# Patient Record
Sex: FEMALE | Race: WHITE | HISPANIC OR LATINO | Employment: FULL TIME | URBAN - METROPOLITAN AREA
[De-identification: names, ages, dates, MRNs, and addresses within clinical notes are randomized per-mention and may not be internally consistent; named-entity substitution may affect disease eponyms.]

---

## 2018-07-30 ENCOUNTER — TRANSCRIBE ORDERS (OUTPATIENT)
Dept: ADMINISTRATIVE | Facility: HOSPITAL | Age: 58
End: 2018-07-30

## 2018-07-30 DIAGNOSIS — Z12.39 SCREENING BREAST EXAMINATION: Primary | ICD-10-CM

## 2018-08-03 ENCOUNTER — HOSPITAL ENCOUNTER (OUTPATIENT)
Dept: MAMMOGRAPHY | Facility: CLINIC | Age: 58
Discharge: HOME | End: 2018-08-03
Payer: COMMERCIAL

## 2018-08-03 DIAGNOSIS — Z12.39 SCREENING BREAST EXAMINATION: ICD-10-CM

## 2018-08-03 PROCEDURE — 77067 SCR MAMMO BI INCL CAD: CPT

## 2019-06-29 ENCOUNTER — APPOINTMENT (EMERGENCY)
Dept: CT IMAGING | Facility: HOSPITAL | Age: 59
DRG: 063 | End: 2019-06-29
Payer: COMMERCIAL

## 2019-06-29 ENCOUNTER — HOSPITAL ENCOUNTER (INPATIENT)
Facility: HOSPITAL | Age: 59
LOS: 3 days | Discharge: HOME | DRG: 063 | End: 2019-07-02
Attending: EMERGENCY MEDICINE | Admitting: INTERNAL MEDICINE
Payer: COMMERCIAL

## 2019-06-29 ENCOUNTER — APPOINTMENT (EMERGENCY)
Dept: RADIOLOGY | Facility: HOSPITAL | Age: 59
DRG: 063 | End: 2019-06-29
Payer: COMMERCIAL

## 2019-06-29 DIAGNOSIS — R20.0 LEFT SIDED NUMBNESS: Primary | ICD-10-CM

## 2019-06-29 DIAGNOSIS — K21.9 GERD (GASTROESOPHAGEAL REFLUX DISEASE): ICD-10-CM

## 2019-06-29 DIAGNOSIS — Z72.0 TOBACCO ABUSE: ICD-10-CM

## 2019-06-29 DIAGNOSIS — I63.9 CVA (CEREBRAL VASCULAR ACCIDENT) (HCC): ICD-10-CM

## 2019-06-29 LAB
ANION GAP BLD CALC-SCNC: 16 MMOL/L (ref 4–13)
ANION GAP SERPL CALCULATED.3IONS-SCNC: 9 MMOL/L (ref 4–13)
APTT PPP: 30 SECONDS (ref 23–37)
BUN BLD-MCNC: 9 MG/DL (ref 5–25)
BUN SERPL-MCNC: 11 MG/DL (ref 5–25)
CA-I BLD-SCNC: 1.17 MMOL/L (ref 1.12–1.32)
CALCIUM SERPL-MCNC: 9.3 MG/DL (ref 8.3–10.1)
CHLORIDE BLD-SCNC: 104 MMOL/L (ref 100–108)
CHLORIDE SERPL-SCNC: 105 MMOL/L (ref 100–108)
CO2 SERPL-SCNC: 27 MMOL/L (ref 21–32)
CREAT BLD-MCNC: 0.6 MG/DL (ref 0.6–1.3)
CREAT SERPL-MCNC: 0.7 MG/DL (ref 0.6–1.3)
ERYTHROCYTE [DISTWIDTH] IN BLOOD BY AUTOMATED COUNT: 12.5 % (ref 11.6–15.1)
EST. AVERAGE GLUCOSE BLD GHB EST-MCNC: 117 MG/DL
GFR SERPL CREATININE-BSD FRML MDRD: 100 ML/MIN/1.73SQ M
GFR SERPL CREATININE-BSD FRML MDRD: 95 ML/MIN/1.73SQ M
GLUCOSE SERPL-MCNC: 93 MG/DL (ref 65–140)
GLUCOSE SERPL-MCNC: 94 MG/DL (ref 65–140)
HBA1C MFR BLD: 5.7 % (ref 4.2–6.3)
HCT VFR BLD AUTO: 39 % (ref 34.8–46.1)
HCT VFR BLD CALC: 39 % (ref 34.8–46.1)
HGB BLD-MCNC: 13 G/DL (ref 11.5–15.4)
HGB BLDA-MCNC: 13.3 G/DL (ref 11.5–15.4)
INR PPP: 1.05 (ref 0.84–1.19)
MCH RBC QN AUTO: 30.3 PG (ref 26.8–34.3)
MCHC RBC AUTO-ENTMCNC: 33.3 G/DL (ref 31.4–37.4)
MCV RBC AUTO: 91 FL (ref 82–98)
PCO2 BLD: 26 MMOL/L (ref 21–32)
PLATELET # BLD AUTO: 337 THOUSANDS/UL (ref 149–390)
PMV BLD AUTO: 8.6 FL (ref 8.9–12.7)
POTASSIUM BLD-SCNC: 3.8 MMOL/L (ref 3.5–5.3)
POTASSIUM SERPL-SCNC: 3.9 MMOL/L (ref 3.5–5.3)
PROTHROMBIN TIME: 13.8 SECONDS (ref 11.6–14.5)
RBC # BLD AUTO: 4.29 MILLION/UL (ref 3.81–5.12)
SODIUM BLD-SCNC: 141 MMOL/L (ref 136–145)
SODIUM SERPL-SCNC: 141 MMOL/L (ref 136–145)
SPECIMEN SOURCE: ABNORMAL
WBC # BLD AUTO: 7.84 THOUSAND/UL (ref 4.31–10.16)

## 2019-06-29 PROCEDURE — 71045 X-RAY EXAM CHEST 1 VIEW: CPT

## 2019-06-29 PROCEDURE — 99283 EMERGENCY DEPT VISIT LOW MDM: CPT | Performed by: EMERGENCY MEDICINE

## 2019-06-29 PROCEDURE — 85730 THROMBOPLASTIN TIME PARTIAL: CPT | Performed by: EMERGENCY MEDICINE

## 2019-06-29 PROCEDURE — 70450 CT HEAD/BRAIN W/O DYE: CPT

## 2019-06-29 PROCEDURE — 80048 BASIC METABOLIC PNL TOTAL CA: CPT | Performed by: EMERGENCY MEDICINE

## 2019-06-29 PROCEDURE — 80047 BASIC METABLC PNL IONIZED CA: CPT

## 2019-06-29 PROCEDURE — 85027 COMPLETE CBC AUTOMATED: CPT | Performed by: EMERGENCY MEDICINE

## 2019-06-29 PROCEDURE — 70496 CT ANGIOGRAPHY HEAD: CPT

## 2019-06-29 PROCEDURE — 85014 HEMATOCRIT: CPT

## 2019-06-29 PROCEDURE — 70498 CT ANGIOGRAPHY NECK: CPT

## 2019-06-29 PROCEDURE — 3E03317 INTRODUCTION OF OTHER THROMBOLYTIC INTO PERIPHERAL VEIN, PERCUTANEOUS APPROACH: ICD-10-PCS | Performed by: EMERGENCY MEDICINE

## 2019-06-29 PROCEDURE — 83036 HEMOGLOBIN GLYCOSYLATED A1C: CPT | Performed by: NURSE PRACTITIONER

## 2019-06-29 PROCEDURE — 36415 COLL VENOUS BLD VENIPUNCTURE: CPT | Performed by: EMERGENCY MEDICINE

## 2019-06-29 PROCEDURE — 99291 CRITICAL CARE FIRST HOUR: CPT | Performed by: PSYCHIATRY & NEUROLOGY

## 2019-06-29 PROCEDURE — 85610 PROTHROMBIN TIME: CPT | Performed by: EMERGENCY MEDICINE

## 2019-06-29 PROCEDURE — 93005 ELECTROCARDIOGRAM TRACING: CPT

## 2019-06-29 PROCEDURE — 99291 CRITICAL CARE FIRST HOUR: CPT

## 2019-06-29 PROCEDURE — 99222 1ST HOSP IP/OBS MODERATE 55: CPT | Performed by: NURSE PRACTITIONER

## 2019-06-29 RX ORDER — LABETALOL 20 MG/4 ML (5 MG/ML) INTRAVENOUS SYRINGE
10 ONCE
Status: DISCONTINUED | OUTPATIENT
Start: 2019-06-29 | End: 2019-06-30

## 2019-06-29 RX ORDER — PANTOPRAZOLE SODIUM 40 MG/1
40 TABLET, DELAYED RELEASE ORAL
Status: DISCONTINUED | OUTPATIENT
Start: 2019-06-29 | End: 2019-07-02 | Stop reason: HOSPADM

## 2019-06-29 RX ORDER — ASPIRIN 81 MG/1
81 TABLET, CHEWABLE ORAL ONCE
Status: DISCONTINUED | OUTPATIENT
Start: 2019-06-29 | End: 2019-06-29

## 2019-06-29 RX ORDER — LABETALOL 20 MG/4 ML (5 MG/ML) INTRAVENOUS SYRINGE
10 EVERY 4 HOURS PRN
Status: DISCONTINUED | OUTPATIENT
Start: 2019-06-29 | End: 2019-07-02 | Stop reason: HOSPADM

## 2019-06-29 RX ORDER — CHLORHEXIDINE GLUCONATE 0.12 MG/ML
15 RINSE ORAL EVERY 12 HOURS SCHEDULED
Status: DISCONTINUED | OUTPATIENT
Start: 2019-06-29 | End: 2019-06-29

## 2019-06-29 RX ADMIN — IODIXANOL 100 ML: 320 INJECTION, SOLUTION INTRAVASCULAR at 11:05

## 2019-06-29 RX ADMIN — ALTEPLASE 65.9 MG: KIT at 11:31

## 2019-06-29 RX ADMIN — PANTOPRAZOLE SODIUM 40 MG: 40 TABLET, DELAYED RELEASE ORAL at 13:45

## 2019-06-29 NOTE — ED PROVIDER NOTES
History  Chief Complaint   Patient presents with    Numbness     left sided numbness started at 1000 while walking  patient with slight left leg weakness  face asymmetrical  deneis facial numbness but states numbness in ear     54-year-old female with a history of gastritis presents with left-sided numbness starting at 10:00 a m  Gail Trejo Patient states she was at work and suddenly felt like her entire left side of her body excluding her face was numb  No weakness, headache, visual complaints or speech deficit  No prior history of similar episode  History provided by:  Patient   used: Yes    CVA/TIA-like Symptoms   Presenting symptoms: sensory loss    Presenting symptoms: no headaches and no weakness    Date/time of last known well:  6/29/2019 10:00 AM  Onset quality:  Sudden  Duration:  1 hour  Timing:  Constant  Progression:  Unchanged  Similar to previous episodes: no    Associated symptoms: paresthesias    Associated symptoms: no chest pain, no trouble swallowing, no dizziness, no facial pain, no fall, no fever, no hearing loss, no bladder incontinence, no nausea, no neck pain, no seizures, no tinnitus, no vertigo and no vomiting        None       Past Medical History:   Diagnosis Date    Gastritis        History reviewed  No pertinent surgical history  History reviewed  No pertinent family history  I have reviewed and agree with the history as documented  Social History     Tobacco Use    Smoking status: Current Every Day Smoker     Packs/day: 0 25    Smokeless tobacco: Never Used   Substance Use Topics    Alcohol use: Not on file    Drug use: Not on file        Review of Systems   Constitutional: Negative  Negative for chills, diaphoresis, fatigue and fever  HENT: Negative  Negative for congestion, hearing loss, rhinorrhea, sore throat, tinnitus and trouble swallowing  Eyes: Negative  Negative for discharge, redness and itching  Respiratory: Negative    Negative for apnea, cough, chest tightness, shortness of breath and wheezing  Cardiovascular: Negative for chest pain, palpitations and leg swelling  Gastrointestinal: Negative  Negative for abdominal pain, nausea and vomiting  Endocrine: Negative  Genitourinary: Negative  Negative for bladder incontinence, flank pain, frequency and urgency  Musculoskeletal: Negative  Negative for back pain and neck pain  Skin: Negative  Allergic/Immunologic: Negative  Neurological: Positive for numbness and paresthesias  Negative for dizziness, vertigo, tremors, seizures, syncope, facial asymmetry, speech difficulty, weakness, light-headedness and headaches  Hematological: Negative  All other systems reviewed and are negative  Physical Exam  Physical Exam   Constitutional: She is oriented to person, place, and time  She appears well-developed and well-nourished  Non-toxic appearance  She does not have a sickly appearance  She does not appear ill  No distress  HENT:   Head: Normocephalic and atraumatic  Right Ear: External ear normal    Left Ear: External ear normal    Mouth/Throat: Oropharynx is clear and moist    Eyes: Pupils are equal, round, and reactive to light  Conjunctivae, EOM and lids are normal  Right eye exhibits no discharge  Left eye exhibits no discharge  No scleral icterus  Right eye exhibits no nystagmus  Left eye exhibits no nystagmus  Neck: Normal range of motion  Neck supple  No JVD present  No tracheal deviation present  No thyromegaly present  Cardiovascular: Normal rate, regular rhythm and normal heart sounds  Exam reveals no gallop and no friction rub  No murmur heard  Pulmonary/Chest: Effort normal and breath sounds normal  No stridor  No respiratory distress  She has no wheezes  She has no rales  She exhibits no tenderness  Abdominal: Soft  Bowel sounds are normal  She exhibits no distension and no mass  There is no tenderness  No hernia     Musculoskeletal: Normal range of motion  She exhibits no edema, tenderness or deformity  Lymphadenopathy:     She has no cervical adenopathy  Neurological: She is alert and oriented to person, place, and time  She has normal reflexes  She displays no tremor and normal reflexes  A sensory deficit is present  No cranial nerve deficit  She exhibits normal muscle tone  She displays no seizure activity  Coordination normal    Skin: Skin is warm and dry  No rash noted  She is not diaphoretic  No erythema  No pallor  Psychiatric: She has a normal mood and affect  Nursing note and vitals reviewed        Vital Signs  ED Triage Vitals [06/29/19 1048]   Temp Pulse Respirations Blood Pressure SpO2   -- 81 20 (!) 176/81 96 %      Temp src Heart Rate Source Patient Position - Orthostatic VS BP Location FiO2 (%)   -- Monitor Sitting Right arm --      Pain Score       --           Vitals:    06/29/19 1048   BP: (!) 176/81   Pulse: 81   Patient Position - Orthostatic VS: Sitting         Visual Acuity  Visual Acuity      Most Recent Value   L Pupil Size (mm)  3   R Pupil Size (mm)  3          ED Medications  Medications - No data to display    Diagnostic Studies  Results Reviewed     Procedure Component Value Units Date/Time    Basic metabolic panel [[id number]]     Lab Status:  No result Specimen:  Blood     CBC and Platelet [[id number]]     Lab Status:  No result Specimen:  Blood     Protime-INR [[id number]]     Lab Status:  No result Specimen:  Blood     APTT [[id number]]     Lab Status:  No result Specimen:  Blood                  CT stroke alert brain    (Results Pending)   CTA stroke alert (head/neck)    (Results Pending)   X-ray chest 1 view portable    (Results Pending)              Procedures  Procedures       ED Course                               MDM  Number of Diagnoses or Management Options  Diagnosis management comments: 62-year-old female presents with sudden onset of left-sided body numbness excluding her face at about 10:00 a m   No headache, visual complaints, weakness or speech deficits  On exam she appears well in no distress  She does have decreased sensation to the left arm and left leg on exam but no motor deficits  Given the acute onset of her symptoms will call stroke alert and discussed case with neurologist   After speaking with Dr Haroldo Gautam from Neurology will administer tPA  Amount and/or Complexity of Data Reviewed  Clinical lab tests: ordered and reviewed  Tests in the radiology section of CPT®: ordered and reviewed  Discuss the patient with other providers: yes  Independent visualization of images, tracings, or specimens: yes        Disposition  Final diagnoses:   None     ED Disposition     None      Follow-up Information    None         Patient's Medications    No medications on file     No discharge procedures on file      ED Provider  Electronically Signed by           Neha Rivas DO  06/29/19 2170

## 2019-06-29 NOTE — ED PROCEDURE NOTE
PROCEDURE  CriticalCare Time  Performed by: Milagro Wilhelm DO  Authorized by: Milagro Wilhelm DO     Critical care provider statement:     Critical care time (minutes):  30    Critical care time was exclusive of:  Separately billable procedures and treating other patients and teaching time    Critical care was necessary to treat or prevent imminent or life-threatening deterioration of the following conditions:  CNS failure or compromise    Critical care was time spent personally by me on the following activities:  Blood draw for specimens, obtaining history from patient or surrogate, development of treatment plan with patient or surrogate, discussions with consultants, evaluation of patient's response to treatment, examination of patient, interpretation of cardiac output measurements, ordering and performing treatments and interventions, ordering and review of laboratory studies, ordering and review of radiographic studies, re-evaluation of patient's condition and review of old charts    I assumed direction of critical care for this patient from another provider in my specialty: no           Milagro Wilhelm DO  06/29/19 1134

## 2019-06-29 NOTE — LETTER
[address] [place] [place] [address]  Dept: [phone]    July 2, 2019     Patient: Marilyn Vivas   YOB: 1960   Date of Visit: 6/29/2019       To Whom it May Concern:    Marilyn Vivas is under my professional care  She was seen in the hospital from 6/29/2019   to 07/02/19  She may return to work on cleared by neurology  without limitations  If you have any questions or concerns, please don't hesitate to call           Sincerely,          Kaylynn August, DO

## 2019-06-29 NOTE — H&P
History & Physical Exam - Ctra  Maximo 3 60 y o  female MRN: [id number]  Unit/Bed#: ICU 07 Encounter: [id number]      Assessment/Plan:  1  Acute CVA  · Patient seen by Neurology receiving tPA  · Admitted to ICU for 24 hours  Patient is expected to require greater than 2 midnight stay  · NIH scale 0  · Maintain systolic blood pressure less than 180 mm of mercury but greater than 160 mm of mercury  2  History of gastritis  · Add proton pump inhibitor        Critical Care Time:   Documented critical care time excludes any procedures documented elsewhere  It also excludes any family updates    _____________________________________________________________________      HPI:    Wendy Stinson is a 60 y o  female who at approximately 10:00, while at work, began to notice numbness on the left side of her body including her arm and leg but did not involve her face  She therefore presented to [facility] Emergency Department seeking further evaluation and treatment  Upon my arrival patient is awake and alert and oriented x3, resting comfortably on a gurney  She denies complaints of headache, visual changes, shortness of breath, chest pain, palpitations, abdominal discomfort, difficulty urinating or defecating, lower extremity edema  She states that her numbness of the leg has resolved and is present only in her left arm  She denies any past medical history of stroke  She has only experienced gastritis in the past, and has no other past medical history  She does smoke cigarettes and continues to smoke approximately 5 cigarettes a day  She is Romanian-speaking only and communication is facilitated by her daughter  Review of Systems:    Full 12 point review of systems was performed  Aside from what was mentioned in the HPI, it is otherwise negative  Historical Information   Past Medical History:   Diagnosis Date    Gastritis      History reviewed   No pertinent surgical history  Social History   Social History     Substance and Sexual Activity   Alcohol Use Not on file     Social History     Substance and Sexual Activity   Drug Use Not on file     Social History     Tobacco Use   Smoking Status Current Every Day Smoker    Packs/day: 0 25   Smokeless Tobacco Never Used       Family History:   History reviewed  No pertinent family history  Medications:  Pertinent medications were reviewed    Current Facility-Administered Medications:  alteplase        alteplase 0 81 mg/kg Intravenous Once Rossy Jan, DO Last Rate: 65 9 mg (06/29/19 1131)   Labetalol HCl 10 mg Intravenous Once Rossy Jan, DO    Labetalol HCl 10 mg Intravenous Q4H PRN Johnice Layer, CRNP    pantoprazole 40 mg Oral Early Morning Johnice Layer, CRNP          No Known Allergies      Vitals:   /84   Pulse 72   Temp 97 9 °F (36 6 °C) (Oral)   Resp 16   Wt 81 3 kg (179 lb 3 7 oz)   SpO2 98%   There is no height or weight on file to calculate BMI    SpO2: 98 %,   SpO2 Activity: At Rest,   O2 Device: None (Room air)    No intake or output data in the 24 hours ending 06/29/19 1208  Invasive Devices     Peripheral Intravenous Line            Peripheral IV 06/29/19 Left Antecubital less than 1 day    Peripheral IV 06/29/19 Left Hand less than 1 day                Physical Exam:  Gen:  Awake and alert pleasant and cooperative  HEENT:  Atraumatic normocephalic pupils equal round reactive to light extraocular muscles intact sclerae anicteric oral mucosa is pink and moist  Neck:  Supple no JVD no lymphadenopathy, trachea midline no bruits auscultated  Chest:  Clear to auscultation bilaterally respirations even and nonlabored, SpO2 in the 90s on room air  Cor:  Regular rate and rhythm no murmurs rubs or gallops appreciated  Abd:  Soft nontender with positive bowel sounds  Ext:  No clubbing cyanosis or edema  Neuro:  Alert and oriented x3 moves all extremities  Skin:  Warm dry and intact no rash      Diagnostic Data:  Lab: I have personally reviewed pertinent lab results  ,   CBC:  Results from last 7 days   Lab Units 06/29/19  1101 06/29/19  1050   WBC Thousand/uL  --  7 84   HEMOGLOBIN g/dL  --  13 0   I STAT HEMOGLOBIN g/dl 13 3  --    HEMATOCRIT %  --  39 0   HEMATOCRIT, ISTAT % 39  --    PLATELETS Thousands/uL  --  337      CMP: Lab Results   Component Value Date    SODIUM 141 06/29/2019    K 3 9 06/29/2019     06/29/2019    CO2 26 06/29/2019    BUN 11 06/29/2019    CREATININE 0 70 06/29/2019    GLUCOSE 94 06/29/2019    CALCIUM 9 3 06/29/2019    EGFR 100 06/29/2019   ,   PT/INR:   Lab Results   Component Value Date    INR 1 05 06/29/2019   ,   Magnesium: No components found for: MAG,  Phosphorous: No results found for: PHOS    ABG: No results found for: PHART, DHA0IWP, PO2ART, YTL0OMQ, J9EIBHSB, BEART, SOURCE,     Microbiology:  No pending microbiology    Imaging: I have personally reviewed the pertinent imaging studies on the PACS system  CTA of the head and neck:  Shows No evidence of significant stenosis, dissection or occlusion involving cervical carotid or vertebral segments or cerebral arteries  CT of the head:  Shows no acute intracranial hemorrhage or mass effect  Chest x-ray demonstrates no cardiopulmonary disease    Cardiac/EKG/telemetry/Echo:     Normal sinus rhythm      VTE Prophylaxis: Sequential compression device Dhiraj Johnson     Code Status: Level 1 - Full Code    Eliazar Saeed, CRNP    Portions of the record may have been created with voice recognition software  Occasional wrong word or "sound a like" substitutions may have occurred due to the inherent limitations of voice recognition software  Read the chart carefully and recognize, using context, where substitutions have occurred

## 2019-06-29 NOTE — CONSULTS
Consultation - Neurology   Miryam Leija 60 y o  female MRN: [id number]  Unit/Bed#: ED 23 Encounter: [id number]      Assessment/Plan   Assessment:  48-year-old female without significant past medical history, presenting with left-sided numbness and ataxia, concerning for possible small stroke  NIHSS 4  CT head without acute abnormality  CTA head and neck, without large vessel occlusion    Plan:  1  After discussion of the risks versus benefit, inclusion/exclusion criteria, consent was obtained for tPA administration  Initially her blood pressure was elevated beyond administration criteria, but that improved without medications  2  Admit to ICU per post tPA protocol  3  Frequent neuro checks  4  Further investigations for stroke workup including MRI, echocardiogram, A1c and lipid profile  5  No aspirin or other anticoagulants for initial 24 hours post tPA  6  Head CT at 12:00 p m  hours post tPA  If MRI is performed in similar time frame, CT does not need to be repeated  7  Further neurologic recommendations pending clinical course and evolving database  8  Stat head CT for any acute change in status, or sudden headache      Physician Requesting Consult: Sarah Charlton DO  Reason for Consult / Principal Problem:  Stroke alert    Patient last known well:  10:00 a m  Stroke alert called:  0187  Neurology time of arrival:  1058  HPI: Moises Gonzales is a 60y o  year old female who presents with numbness and heaviness affecting her left side  Onset of symptoms was approximately 10:00 a m  This morning while she was out walking  Symptoms are persistent in the emergency room  She denied noting difficulty with her speech  No associated headache  No prior history of similar symptoms       Inpatient consult to Neurology  Consult performed by: Daniel Krishnan,   Consult ordered by: Sarah Charlton DO          Review of Systems   Unable to perform ROS: Acuity of condition       Historical Information   Past Medical History:   Diagnosis Date    Gastritis      History reviewed  No pertinent surgical history  Social History   Social History     Substance and Sexual Activity   Alcohol Use Not on file     Social History     Substance and Sexual Activity   Drug Use Not on file     Social History     Tobacco Use   Smoking Status Current Every Day Smoker    Packs/day: 0 25   Smokeless Tobacco Never Used     Family History: non-contributory        Meds/Allergies   PTA meds:   None       No Known Allergies    Objective   Vitals:Blood pressure (!) 188/77, pulse 70, temperature (!) 97 4 °F (36 3 °C), temperature source Oral, resp  rate 16, weight 81 3 kg (179 lb 3 7 oz), SpO2 99 %  ,There is no height or weight on file to calculate BMI  No intake or output data in the 24 hours ending 06/29/19 1128    Invasive Devices: Invasive Devices     Peripheral Intravenous Line            Peripheral IV 06/29/19 Left Antecubital less than 1 day    Peripheral IV 06/29/19 Left Hand less than 1 day                Physical Exam   Constitutional: She appears well-developed and well-nourished  No distress  HENT:   Head: Normocephalic and atraumatic  Mouth/Throat: No oropharyngeal exudate  Eyes: Conjunctivae are normal  No scleral icterus  Cardiovascular: Normal rate and regular rhythm  Pulmonary/Chest: Effort normal  No respiratory distress  Neurological: She has normal strength  Skin: Skin is warm and dry  She is not diaphoretic  Psychiatric: She has a normal mood and affect  Nursing note and vitals reviewed  Neurologic Exam     Mental Status   Able to name object  Able to read  Able to repeat  Normal comprehension  Awake and alert  Oriented x3  Speaks fluent in [address]  Cranial Nerves   Visual fields are full  Extraocular movements are intact  No gaze preference or palsy  Symmetric facial sensation, and no resting asymmetry  No significant facial weakness with volition  No dysarthria  Tongue protrudes midline  Hearing grossly intact  Motor Exam   Overall muscle tone: normal  Right arm pronator drift: absent  Left arm pronator drift: absent    Strength   Strength 5/5 throughout  Sensory Exam   Pinprick reported diminished left versus right limbs  No neglect     Gait, Coordination, and Reflexes Mild ataxia present left upper and lower extremity     NIHSS:    1a Level of Consciousness: 0 = Alert   1b  LOC Questions: 0 = Answers both correctly   1c  LOC Commands: 0 = Obeys both correctly   2  Best Gaze: 0 = Normal   3  Visual: 0 = No visual field loss   4  Facial Palsy: 0=Normal symmetric movement   5a  Motor Right Arm: 0=No drift, limb holds 90 (or 45) degrees for full 10 seconds   5b  Motor Left Arm: 0=No drift, limb holds 90 (or 45) degrees for full 10 seconds   6a  Motor Right Le=No drift, limb holds 90 (or 45) degrees for full 10 seconds   6b  Motor Left Le=Drift, limb holds 90 (or 45) degrees but drifts down before full 10 seconds: does not hit bed   7  Limb Ataxia:  2=Present in two limbs   8  Sensory: 1=Mild to moderate sensory loss; patient feels pinprick is less sharp or is dull on the affected side; there is a loss of superficial pain with pinprick but patient is aware She is being touched   9  Best Language:  0=No aphasia, normal   10  Dysarthria: 0=Normal articulation   11  Extinction and Inattention (formerly Neglect): 0=No abnormality   Total Score: 4                   Lab Results:   I have personally reviewed pertinent reports    , CBC:   Results from last 7 days   Lab Units 19  1101 19  1050   WBC Thousand/uL  --  7 84   RBC Million/uL  --  4 29   HEMOGLOBIN g/dL  --  13 0   I STAT HEMOGLOBIN g/dl 13 3  --    HEMATOCRIT %  --  39 0   HEMATOCRIT, ISTAT % 39  --    MCV fL  --  91   PLATELETS Thousands/uL  --  337   , BMP/CMP:   Results from last 7 days   Lab Units 19  1101 19  1050   SODIUM mmol/L  --  141   POTASSIUM mmol/L  --  3 9   CHLORIDE mmol/L  --  105   CO2 mmol/L  --  27   CO2, I-STAT mmol/L 26  --    BUN mg/dL  --  11   CREATININE mg/dL  --  0 70   GLUCOSE, ISTAT mg/dl 94  --    CALCIUM mg/dL  --  9 3   EGFR ml/min/1 73sq m 100 95   , Coagulation:   Results from last 7 days   Lab Units 06/29/19  1050   INR  1 05     Imaging Studies: I have personally reviewed pertinent films in PACS  EKG, Pathology, and Other Studies: I have personally reviewed pertinent reports  Code Status: No Order  Advance Directive and Living Will:      Power of :    POLST:      Counseling / Coordination of Care  Total Critical Care time spent 35 minutes excluding procedures, teaching and family updates

## 2019-06-29 NOTE — PLAN OF CARE
Problem: Potential for Falls  Goal: Patient will remain free of falls  Description  INTERVENTIONS:  - Assess patient frequently for physical needs  -  Identify cognitive and physical deficits and behaviors that affect risk of falls  -  [place] fall precautions as indicated by assessment   - Educate patient/family on patient safety including physical limitations  - Instruct patient to call for assistance with activity based on assessment  - Modify environment to reduce risk of injury  - Consider OT/PT consult to assist with strengthening/mobility  Outcome: Progressing     Problem: NEUROSENSORY - ADULT  Goal: Achieves stable or improved neurological status  Description  INTERVENTIONS  - Monitor and report changes in neurological status  - Initiate measures to prevent increased intracranial pressure  - Maintain blood pressure and fluid volume within ordered parameters to optimize cerebral perfusion  - Monitor temperature, glucose, and sodium or any other associated labs   Initiate appropriate interventions as ordered  - Monitor for seizure activity   - Administer anti-seizure medications as ordered  Outcome: Progressing  Goal: Achieves maximal functionality and self care  Description  INTERVENTIONS  - Monitor swallowing and airway patency with patient fatigue and changes in neurological status  - Encourage and assist patient to increase activity and self care with guidance from rehab services  - Encourage visually impaired, hearing impaired and aphasic patients to use assistive/communication devices  Outcome: Progressing     Problem: CARDIOVASCULAR - ADULT  Goal: Maintains optimal cardiac output and hemodynamic stability  Description  INTERVENTIONS:  - Monitor I/O, vital signs and rhythm  - Monitor for S/S and trends of decreased cardiac output i e  bleeding, hypotension  - Administer and titrate ordered vasoactive medications to optimize hemodynamic stability  - Assess quality of pulses, skin color and temperature  - Assess for signs of decreased coronary artery perfusion - ex  Angina  - Instruct patient to report change in severity of symptoms  Outcome: Progressing     Problem: RESPIRATORY - ADULT  Goal: Achieves optimal ventilation and oxygenation  Description  INTERVENTIONS:  - Assess for changes in respiratory status  - Assess for changes in mentation and behavior  - Position to facilitate oxygenation and minimize respiratory effort  - Oxygen administration by appropriate delivery method based on oxygen saturation (per order) or ABGs  - Initiate smoking cessation education as indicated  - Encourage broncho-pulmonary hygiene including cough, deep breathe, Incentive Spirometry  - Assess the need for suctioning and aspirate as needed  - Assess and instruct to report SOB or any respiratory difficulty  - Respiratory Therapy support as indicated  Outcome: Progressing     Problem: Knowledge Deficit  Goal: Patient/family/caregiver demonstrates understanding of disease process, treatment plan, medications, and discharge instructions  Description  Complete learning assessment and assess knowledge base    Interventions:  - Provide teaching at level of understanding  - Provide teaching via preferred learning methods  Outcome: Progressing

## 2019-06-30 ENCOUNTER — APPOINTMENT (INPATIENT)
Dept: MRI IMAGING | Facility: HOSPITAL | Age: 59
DRG: 063 | End: 2019-06-30
Payer: COMMERCIAL

## 2019-06-30 LAB
CHOLEST SERPL-MCNC: 180 MG/DL (ref 50–200)
HDLC SERPL-MCNC: 40 MG/DL (ref 40–60)
LDLC SERPL CALC-MCNC: 120 MG/DL (ref 0–100)
NONHDLC SERPL-MCNC: 140 MG/DL
TRIGL SERPL-MCNC: 99 MG/DL

## 2019-06-30 PROCEDURE — 99232 SBSQ HOSP IP/OBS MODERATE 35: CPT | Performed by: INTERNAL MEDICINE

## 2019-06-30 PROCEDURE — 80061 LIPID PANEL: CPT | Performed by: NURSE PRACTITIONER

## 2019-06-30 PROCEDURE — 99232 SBSQ HOSP IP/OBS MODERATE 35: CPT | Performed by: PSYCHIATRY & NEUROLOGY

## 2019-06-30 PROCEDURE — 70551 MRI BRAIN STEM W/O DYE: CPT

## 2019-06-30 RX ORDER — CLOPIDOGREL BISULFATE 75 MG/1
75 TABLET ORAL DAILY
Status: DISCONTINUED | OUTPATIENT
Start: 2019-07-01 | End: 2019-06-30

## 2019-06-30 RX ORDER — ASPIRIN 81 MG/1
81 TABLET, CHEWABLE ORAL DAILY
Status: DISCONTINUED | OUTPATIENT
Start: 2019-07-01 | End: 2019-06-30

## 2019-06-30 RX ORDER — ATORVASTATIN CALCIUM 80 MG/1
80 TABLET, FILM COATED ORAL
Status: DISCONTINUED | OUTPATIENT
Start: 2019-06-30 | End: 2019-07-02 | Stop reason: HOSPADM

## 2019-06-30 RX ORDER — ASPIRIN 81 MG/1
81 TABLET, CHEWABLE ORAL DAILY
Status: DISCONTINUED | OUTPATIENT
Start: 2019-06-30 | End: 2019-07-02 | Stop reason: HOSPADM

## 2019-06-30 RX ORDER — ATORVASTATIN CALCIUM 80 MG/1
80 TABLET, FILM COATED ORAL
Status: DISCONTINUED | OUTPATIENT
Start: 2019-06-30 | End: 2019-06-30 | Stop reason: SDUPTHER

## 2019-06-30 RX ADMIN — PANTOPRAZOLE SODIUM 40 MG: 40 TABLET, DELAYED RELEASE ORAL at 05:21

## 2019-06-30 RX ADMIN — ASPIRIN 81 MG 81 MG: 81 TABLET ORAL at 15:45

## 2019-06-30 RX ADMIN — NICOTINE 7 MG: 7 PATCH TRANSDERMAL at 15:45

## 2019-06-30 RX ADMIN — ATORVASTATIN CALCIUM 80 MG: 80 TABLET, FILM COATED ORAL at 15:45

## 2019-06-30 NOTE — PROGRESS NOTES
Progress Note - Neurology   Jean Leija 60 y o  female MRN: [id number]  Unit/Bed#: E4 -01 Encounter: [id number]    Assessment:  1  Right thalamic lacunar infarct, secondary to small-vessel disease  2  Dyslipidemia  3  Elevated blood pressure in the emergency room, now improved spontaneously  No history of hypertension known  4  Tobacco use      Plan:  1  Initiate aspirin, statin  2  Await echocardiogram  3  Continue therapy  4  Counseled smoking cessation  Nicotine patch prescribed  Subjective:   Patient reports still some subjective numbness, mostly left arm  Has persisting difficulty with coordinating left lower extremity movements while walking  No headache or new weakness  Her MRI did demonstrate tiny lacunar infarct in the right thalamus    ROS:    Review of Systems   Constitutional: Negative  HENT: Negative  Eyes: Negative  Respiratory: Negative  Cardiovascular: Negative  Gastrointestinal: Negative  Neurological: Positive for numbness  Negative for dizziness, tremors, facial asymmetry, speech difficulty, weakness, light-headedness and headaches  Psychiatric/Behavioral: Negative  Vitals: Blood pressure 145/65, pulse 85, temperature 97 7 °F (36 5 °C), temperature source Tympanic, resp  rate 20, height 5' 5" (1 651 m), weight 84 6 kg (186 lb 8 2 oz), SpO2 94 %  ,Body mass index is 31 04 kg/m²  Physical Exam   Constitutional: She is oriented to person, place, and time  She appears well-developed and well-nourished  No distress  HENT:   Head: Normocephalic and atraumatic  Mouth/Throat: No oropharyngeal exudate  Eyes: Conjunctivae are normal  No scleral icterus  Neck: Normal range of motion  Neck supple  Cardiovascular: Regular rhythm and normal heart sounds  No murmur heard  Pulmonary/Chest: Effort normal  No respiratory distress  Abdominal: Soft  There is no tenderness  Musculoskeletal: She exhibits no edema or deformity     Neurological: She is oriented to person, place, and time  She has normal strength  Skin: Skin is warm and dry  She is not diaphoretic  No erythema  Psychiatric: Her speech is normal    Nursing note and vitals reviewed  Neurologic Exam     Mental Status   Oriented to person, place, and time  Speech: speech is normal   Level of consciousness: alert  Able to name object  Able to read  Able to repeat  Normal comprehension  Cranial Nerves   Pupils equally reactive to light  Extraocular movements intact  No reported facial sensory asymmetry  No facial weakness  Tongue protrudes midline  No dysarthria     Motor Exam   Right arm pronator drift: absent  Left arm pronator drift: absent    Strength   Strength 5/5 throughout  Sensory Exam   Mild decreased pinprick intensity left versus right limbs  Gait, Coordination, and Reflexes Still with mild clumsiness left-sided heel-to-shin  Left finger-to-nose testing improved  Right-sided limbs without ataxia  Lab Results:   I have personally reviewed pertinent reports  , Lipid Profile:   Results from last 7 days   Lab Units 06/30/19  0508   HDL mg/dL 40   LDL CALC mg/dL 120*   TRIGLYCERIDES mg/dL 99      A1c normal    Imaging Studies: I have personally reviewed pertinent films in PACS  MRI shows small lacunar infarct right thalamus  EKG, Pathology, and Other Studies: I have personally reviewed pertinent reports      Echocardiogram pending

## 2019-06-30 NOTE — UTILIZATION REVIEW
Initial Clinical Review    Admission: Date/Time/Statement: 6/29/19 @ 1130     Orders Placed This Encounter   Procedures    Inpatient Admission     Standing Status:   Standing     Number of Occurrences:   1     Order Specific Question:   Admitting Physician     Answer:   Librada Schirmer [[id number]]     Order Specific Question:   Level of Care     Answer:   Level 1 Stepdown [13]     Order Specific Question:   Estimated length of stay     Answer:   More than 2 Midnights     Order Specific Question:   Certification     Answer:   I certify that inpatient services are medically necessary for this patient for a duration of greater than two midnights  See H&P and MD Progress Notes for additional information about the patient's course of treatment  ED Arrival Information     Expected Arrival Acuity Means of Arrival Escorted By Service Admission Type    - 6/29/2019 10:41 [facility] [address] [place] [address] Emergency    Arrival Complaint    -        Chief Complaint   Patient presents with    Numbness     left sided numbness started at 1000 while walking  patient with slight left leg weakness  face asymmetrical  deneis facial numbness but states numbness in ear     Assessment/Plan: MS HENRI GOTTI IS A 61 YO FEMALE WHO PRESENTS TO THE ED VIA EMS FROM WORK WITH NUMBNESS TO L ARM AND LEG AND ATAXIA  THERE ARE NO OTHER SYMPTOMS  + SMOKER  BY THE TIME SHE ARRIVED AT THE ED THE L LEG NUMBNESS WAS GONE BUT L ARM NUMBNESS PERSISTED  SHE IS ADMITTED TO INPATIENT STATUS WITH ACUTE CVA AND IS RECEIVING TPA  NEURO CONSULT - NIHSS = 4, TPA, FREQ NEURO CHECKS, MRI, ECHO, NO ASPIRIN OR ANTICOAGS FOR 24 HR POST TPA  CT HEAD AND AND CTA HEAD AND NECK W/O ABNORMALITY  PMH: GASTRITIS - ADD PPI        ED Triage Vitals   Temperature Pulse Respirations Blood Pressure SpO2   06/29/19 1055 06/29/19 1048 06/29/19 1048 06/29/19 1048 06/29/19 1048   (!) 97 4 °F (36 3 °C) 81 20 (!) 176/81 96 %      Temp Source Heart Rate Source Patient Position - Orthostatic VS BP Location FiO2 (%)   06/29/19 1055 06/29/19 1048 06/29/19 1048 06/29/19 1048 --   Oral Monitor Sitting Right arm       Pain Score       06/29/19 1100       No Pain        Wt Readings from Last 1 Encounters:   06/30/19 84 6 kg (186 lb 8 2 oz)     Additional Vital Signs:   06/30/19 1400    72  20      98 %  None (Room air)   06/30/19 1300    76  24Abnormal   110/52    98 %  None (Room air)   06/30/19 1230    74  23Abnormal   148/60    97 %     06/30/19 1200    76  34Abnormal       99 %  None (Room air)   06/30/19 1100    78  25Abnormal   139/72    99 %  None (Room air)   06/30/19 1000    74  19  118/56    97 %  None (Room air)   06/30/19 0900    74  20  106/72    99 %  None (Room air)   06/30/19 0800    74  18  151/74  93  98 %  None (Room air)   06/30/19 0700  98 °F (36 7 °C)  68  17  118/66  92  97 %  None (Room air)   06/30/19 0600    62  13  136/72  105  97 %     06/30/19 0500    76  24Abnormal   125/61  88  97 %     06/30/19 0400    68  14  123/53  78  98 %     06/30/19 0300    70  24Abnormal   113/68  84  97 %     06/30/19 0251  98 6 °F (37 °C)               06/30/19 0200    70  15  104/57  68  97 %     06/30/19 0107    68  17  121/68  86  97 %     06/30/19 0000    74  24Abnormal   122/70  91  97 %     06/29/19 2315  98 °F (36 7 °C)               06/29/19 2300    72  17  120/61  90  97 %     06/29/19 2200    86  23Abnormal   115/59  83  98 %     06/29/19 2100    80  21  131/58  80  97 %     06/29/19 2030    72  24Abnormal   148/89  136  99 %     06/29/19 2000    74  22  145/75  97  98 %     06/29/19 1934  98 1 °F (36 7 °C)               06/29/19 1930    68  24Abnormal   135/64  88  97 %     06/29/19 1900    72  24Abnormal   138/71  108  99 %     06/29/19 1830    78  24Abnormal   129/71  93  98 %     06/29/19 1800    80  32Abnormal   150/78  97  98 %     06/29/19 1730    80  24Abnormal   163/72 109  98 %     06/29/19 1700    74  24Abnormal   133/72  94  99 %     06/29/19 1630    80  28Abnormal   149/96  125  99 %     06/29/19 1600    90  30Abnormal   133/77  102  93 %     06/29/19 1535    80  20  138/68  99  99 %     06/29/19 1511  97 1 °F (36 2 °C)Abnormal                06/29/19 1505    76  31Abnormal   141/67  85  99 %     06/29/19 1430    82  20  166/98  130  99 %     06/29/19 1415    74  29Abnormal   177/87Abnormal   125  99 %     06/29/19 1410    70  26Abnormal   165/81  108       06/29/19 1345    80  31Abnormal   161/93  116  98 %     06/29/19 1315    72  24Abnormal   149/79  113  99 %     06/29/19 1300    70  37Abnormal   153/96  127  99 %     06/29/19 1245    68  14  163/79  114  98 %     06/29/19 1235    64  17  151/81  109  98 %     06/29/19 1215    66  22  148/75  102  98 %     06/29/19 1214  98 °F (36 7 °C)               06/29/19 1205  98 °F (36 7 °C)   72  21  161/84  125  98 %  None (Room air)   06/29/19 1145  97 9 °F (36 6 °C)  72  16  163/84    98 %     06/29/19 1130    75  16  174/80Abnormal     98 %  None (Room air)   06/29/19 1100  97 4 °F (36 3 °C)Abnormal   70  16  188/77Abnormal     99 %  None (Room air)   06/29/19 1055  97 4 °F (36 3 °C)Abnormal                  Pertinent Labs/Diagnostic Test Results:     6/29 CT HEAD - STROKE ALERT - No acute intracranial hemorrhage or mass effect     6/29 CTA HEAD/NECK STROKE ALERT - No evidence of significant stenosis, dissection or occlusion involving cervical carotid or vertebral segments or cerebral arteries  No evidence of aneurysm   or AVM  6/29 CXR - NO ACUTE DISEASE     6/30 MRI BRAIN - Several mm acute right thalamic lacunar infarction    Mild chronic microangiopathy    6/29 ECG - NSR     Results from last 7 days   Lab Units 06/29/19  1101 06/29/19  1050   WBC Thousand/uL  --  7 84   HEMOGLOBIN g/dL  --  13 0   I STAT HEMOGLOBIN g/dl 13 3  --    HEMATOCRIT %  --  39 0   HEMATOCRIT, ISTAT % 39  --    PLATELETS Thousands/uL  --  337     Results from last 7 days   Lab Units 06/29/19  1101 06/29/19  1050   SODIUM mmol/L  --  141   POTASSIUM mmol/L  --  3 9   CHLORIDE mmol/L  --  105   CO2 mmol/L  --  27   CO2, I-STAT mmol/L 26  --    AGAP mmol/L 16*  --    ANION GAP mmol/L  --  9   BUN mg/dL  --  11   CREATININE mg/dL  --  0 70   EGFR ml/min/1 73sq m 100 95   CALCIUM mg/dL  --  9 3   CALCIUM, IONIZED, ISTAT mmol/L 1 17  --      Results from last 7 days   Lab Units 06/29/19  1050   GLUCOSE RANDOM mg/dL 93     Results from last 7 days   Lab Units 06/29/19  1050   HEMOGLOBIN A1C % 5 7   EAG mg/dl 117     Results from last 7 days   Lab Units 06/29/19  1050   PROTIME seconds 13 8   INR  1 05   PTT seconds 30     ED Treatment:   Medication Administration from 06/29/2019 1041 to 06/29/2019 1202    Date/Time Order Dose Route Action   06/29/2019 1105 iodixanol (VISIPAQUE) 320 MG/ML injection 100 mL 100 mL Intravenous Given   06/29/2019 1129 alteplase (ACTIVASE) bolus 7 3 mg 7 3 mg Intravenous Given   06/29/2019 1131 alteplase (ACTIVASE) infusion 65 9 mg 65 9 mg Intravenous New Bag        Past Medical History:   Diagnosis Date    Gastritis      Present on Admission:   CVA (cerebral vascular accident) ([facility] [address] )    Admitting Diagnosis: Numbness [R20 0]  Left sided numbness [R20 0]     Age/Sex: 60 y o  female     Admission Orders:    Current Facility-Administered Medications:  Labetalol HCl 10 mg Intravenous Once   Labetalol HCl 10 mg Intravenous Q4H PRN   pantoprazole 40 mg Oral Early Morning     LEVEL 1 SD THEN UPGRADED TO CRITICAL CARE  CAM ASSESS   Neuro checks Every 1 hour x 4 hours, then every 2 hours x 4, then every 4 hours x 72 hours                 EARLY MOBILIZATION   OXYGEN VIA NC TO KEEP SAT > 92%  REGULAR DIET  SP TPA VITALS   SCDs  IP CONSULT TO NEUROLOGY  IP CONSULT TO CASE MANAGEMENT  PT/OT/ST EVAL/TX     Network Utilization Review Department  Phone: [phone];  Fax [phone]  [email or web address]  ATTENTION: Please call with any questions or concerns to [phone]  and carefully listen to the prompts so that you are directed to the right person  Send all requests for admission clinical reviews, approved or denied determinations and any other requests to fax [phone]   All voicemails are confidential

## 2019-06-30 NOTE — PLAN OF CARE
Problem: Potential for Falls  Goal: Patient will remain free of falls  Description  INTERVENTIONS:  - Assess patient frequently for physical needs  -  Identify cognitive and physical deficits and behaviors that affect risk of falls  -  [place] fall precautions as indicated by assessment   - Educate patient/family on patient safety including physical limitations  - Instruct patient to call for assistance with activity based on assessment  - Modify environment to reduce risk of injury  - Consider OT/PT consult to assist with strengthening/mobility  Outcome: Progressing     Problem: NEUROSENSORY - ADULT  Goal: Achieves stable or improved neurological status  Description  INTERVENTIONS  - Monitor and report changes in neurological status  - Initiate measures to prevent increased intracranial pressure  - Maintain blood pressure and fluid volume within ordered parameters to optimize cerebral perfusion  - Monitor temperature, glucose, and sodium or any other associated labs   Initiate appropriate interventions as ordered  - Monitor for seizure activity   - Administer anti-seizure medications as ordered  Outcome: Progressing  Goal: Achieves maximal functionality and self care  Description  INTERVENTIONS  - Monitor swallowing and airway patency with patient fatigue and changes in neurological status  - Encourage and assist patient to increase activity and self care with guidance from rehab services  - Encourage visually impaired, hearing impaired and aphasic patients to use assistive/communication devices  Outcome: Progressing     Problem: CARDIOVASCULAR - ADULT  Goal: Maintains optimal cardiac output and hemodynamic stability  Description  INTERVENTIONS:  - Monitor I/O, vital signs and rhythm  - Monitor for S/S and trends of decreased cardiac output i e  bleeding, hypotension  - Administer and titrate ordered vasoactive medications to optimize hemodynamic stability  - Assess quality of pulses, skin color and temperature  - Assess for signs of decreased coronary artery perfusion - ex  Angina  - Instruct patient to report change in severity of symptoms  Outcome: Progressing     Problem: RESPIRATORY - ADULT  Goal: Achieves optimal ventilation and oxygenation  Description  INTERVENTIONS:  - Assess for changes in respiratory status  - Assess for changes in mentation and behavior  - Position to facilitate oxygenation and minimize respiratory effort  - Oxygen administration by appropriate delivery method based on oxygen saturation (per order) or ABGs  - Initiate smoking cessation education as indicated  - Encourage broncho-pulmonary hygiene including cough, deep breathe, Incentive Spirometry  - Assess the need for suctioning and aspirate as needed  - Assess and instruct to report SOB or any respiratory difficulty  - Respiratory Therapy support as indicated  Outcome: Progressing     Problem: Knowledge Deficit  Goal: Patient/family/caregiver demonstrates understanding of disease process, treatment plan, medications, and discharge instructions  Description  Complete learning assessment and assess knowledge base    Interventions:  - Provide teaching at level of understanding  - Provide teaching via preferred learning methods  Outcome: Progressing

## 2019-06-30 NOTE — PROGRESS NOTES
Progress Note - Pat  Maximo 3 60 y o  female MRN: [id number]  Unit/Bed#: ICU 07 Encounter: [id number]    Assessment/Plan:  1  Acute CVA, s/p tpa  · Awaiting MRI, 24 hour repeat Head CT scan  · Continue neuro checks  · Continue stroke protocol  · BP goal 160-180mmhg    2  History of gastritis  · Continue protonix daily    _____________________________________________________________________    HPI/24hr events:   Patient reports intermittent numbness and tingling of left arm and left leg with no weakness  She reports that the numbness is less than the day of admission  Medications:    Current Facility-Administered Medications:  Labetalol HCl 10 mg Intravenous Once Lawrance Aschoff, DO   Labetalol HCl 10 mg Intravenous Q4H PRN RIVERA Fontanez   pantoprazole 40 mg Oral Early Morning RIVERA Fontanez              Physical exam:  Vitals: Body mass index is 31 04 kg/m²  Blood pressure 136/72, pulse 62, temperature 98 6 °F (37 °C), temperature source Temporal, resp  rate 13, height 5' 5" (1 651 m), weight 84 6 kg (186 lb 8 2 oz), SpO2 97 %  ,  Temp  Min: 97 1 °F (36 2 °C)  Max: 98 6 °F (37 °C)  IBW: 57 kg    SpO2: 97 %  SpO2 Activity: At Rest  O2 Device: None (Room air)      Intake/Output Summary (Last 24 hours) at 6/30/2019 0605  Last data filed at 6/30/2019 0447  Gross per 24 hour   Intake 305 9 ml   Output 1800 ml   Net -1494 1 ml       Invasive/non-invasive ventilation settings:   Respiratory    Lab Data (Last 4 hours)    None         O2/Vent Data (Last 4 hours)    None              Invasive Devices     Peripheral Intravenous Line            Peripheral IV 06/29/19 Left Antecubital less than 1 day    Peripheral IV 06/29/19 Left Hand less than 1 day                  Physical Exam:  Gen: pleasant, in NAD  HEENT: normocephalic, atraumatic  Neck: no JVD, no lymphadenopathy, trachea midline  Chest: lung sounds clear, equal  Cor: audible S1,S2, no edema  Abd: soft, non tender, non distended  Ext: moves all extremities, equal strenghts  Neuro: alert and oriented x 3, CN II-XII intact  Skin: warm, dry      Diagnostic Data:  Lab: I have personally reviewed pertinent lab results  CBC:   Results from last 7 days   Lab Units 06/29/19  1101 06/29/19  1050   WBC Thousand/uL  --  7 84   HEMOGLOBIN g/dL  --  13 0   I STAT HEMOGLOBIN g/dl 13 3  --    HEMATOCRIT %  --  39 0   HEMATOCRIT, ISTAT % 39  --    PLATELETS Thousands/uL  --  337       CMP:   Results from last 7 days   Lab Units 06/29/19  1101 06/29/19  1050   SODIUM mmol/L  --  141   POTASSIUM mmol/L  --  3 9   CHLORIDE mmol/L  --  105   CO2 mmol/L  --  27   CO2, I-STAT mmol/L 26  --    BUN mg/dL  --  11   CREATININE mg/dL  --  0 70   CALCIUM mg/dL  --  9 3   GLUCOSE, ISTAT mg/dl 94  --      PT/INR:   Lab Results   Component Value Date    INR 1 05 06/29/2019   ,   Magnesium:     Phosphorous:       Microbiology:        Imaging:  n/a    Cardiac lab/EKG/telemetry/ECHO:   NSR    VTE Prophylaxis: SCD    Code Status: Level 1 - Full Code    RIVERA Lebron    Portions of the record may have been created with voice recognition software  Occasional wrong word or "sound a like" substitutions may have occurred due to the inherent limitations of voice recognition software  Read the chart carefully and recognize, using context, where substitutions have occurred

## 2019-07-01 ENCOUNTER — APPOINTMENT (INPATIENT)
Dept: NON INVASIVE DIAGNOSTICS | Facility: HOSPITAL | Age: 59
DRG: 063 | End: 2019-07-01
Payer: COMMERCIAL

## 2019-07-01 LAB
ATRIAL RATE: 69 BPM
P AXIS: 67 DEGREES
PR INTERVAL: 178 MS
QRS AXIS: 5 DEGREES
QRSD INTERVAL: 96 MS
QT INTERVAL: 382 MS
QTC INTERVAL: 409 MS
T WAVE AXIS: 64 DEGREES
VENTRICULAR RATE: 69 BPM

## 2019-07-01 PROCEDURE — 93306 TTE W/DOPPLER COMPLETE: CPT

## 2019-07-01 PROCEDURE — G8979 MOBILITY GOAL STATUS: HCPCS

## 2019-07-01 PROCEDURE — 93010 ELECTROCARDIOGRAM REPORT: CPT | Performed by: INTERNAL MEDICINE

## 2019-07-01 PROCEDURE — G8978 MOBILITY CURRENT STATUS: HCPCS

## 2019-07-01 PROCEDURE — 93306 TTE W/DOPPLER COMPLETE: CPT | Performed by: INTERNAL MEDICINE

## 2019-07-01 PROCEDURE — 97163 PT EVAL HIGH COMPLEX 45 MIN: CPT

## 2019-07-01 PROCEDURE — G8988 SELF CARE GOAL STATUS: HCPCS

## 2019-07-01 PROCEDURE — G8987 SELF CARE CURRENT STATUS: HCPCS

## 2019-07-01 PROCEDURE — 99232 SBSQ HOSP IP/OBS MODERATE 35: CPT | Performed by: INTERNAL MEDICINE

## 2019-07-01 PROCEDURE — 97167 OT EVAL HIGH COMPLEX 60 MIN: CPT

## 2019-07-01 RX ORDER — DOCUSATE SODIUM 100 MG/1
100 CAPSULE, LIQUID FILLED ORAL 2 TIMES DAILY
Status: DISCONTINUED | OUTPATIENT
Start: 2019-07-01 | End: 2019-07-02 | Stop reason: HOSPADM

## 2019-07-01 RX ORDER — SENNOSIDES 8.6 MG
1 TABLET ORAL
Status: DISCONTINUED | OUTPATIENT
Start: 2019-07-01 | End: 2019-07-02 | Stop reason: HOSPADM

## 2019-07-01 RX ORDER — POLYETHYLENE GLYCOL 3350 17 G/17G
17 POWDER, FOR SOLUTION ORAL DAILY PRN
Status: DISCONTINUED | OUTPATIENT
Start: 2019-07-01 | End: 2019-07-02 | Stop reason: HOSPADM

## 2019-07-01 RX ADMIN — PANTOPRAZOLE SODIUM 40 MG: 40 TABLET, DELAYED RELEASE ORAL at 05:41

## 2019-07-01 RX ADMIN — DOCUSATE SODIUM 100 MG: 100 CAPSULE, LIQUID FILLED ORAL at 09:32

## 2019-07-01 RX ADMIN — POLYETHYLENE GLYCOL 3350 17 G: 17 POWDER, FOR SOLUTION ORAL at 13:04

## 2019-07-01 RX ADMIN — ATORVASTATIN CALCIUM 80 MG: 80 TABLET, FILM COATED ORAL at 18:10

## 2019-07-01 RX ADMIN — SENNOSIDES 8.6 MG: 8.6 TABLET, FILM COATED ORAL at 21:25

## 2019-07-01 RX ADMIN — ASPIRIN 81 MG 81 MG: 81 TABLET ORAL at 08:44

## 2019-07-01 RX ADMIN — DOCUSATE SODIUM 100 MG: 100 CAPSULE, LIQUID FILLED ORAL at 18:11

## 2019-07-01 RX ADMIN — NICOTINE 7 MG: 7 PATCH TRANSDERMAL at 08:44

## 2019-07-01 NOTE — OCCUPATIONAL THERAPY NOTE
[address]  Evaluation     Patient Name: Qasim Suazo  ZZVUW'A Date: 7/1/2019  Problem List  Patient Active Problem List   Diagnosis    CVA (cerebral vascular accident) [facility])     Past Medical History  Past Medical History:   Diagnosis Date    Gastritis      Past Surgical History  History reviewed  No pertinent surgical history            07/01/19 1511   Note Type   Note type Eval/Treat   Restrictions/Precautions   Weight Bearing Precautions Per Order No   Other Precautions Fall Risk;Telemetry;Multiple lines   Pain Assessment   Pain Assessment No/denies pain   Pain Score No Pain   Home Living   Type of Home Apartment  (2nd floor)   Home Layout One level;Stairs to enter with rails  (flight w/ b/l rails to 2nd floor)   Bathroom Shower/Tub Walk-in shower  (small lip to enter)   [address] bars in [facility]   (no DME)   Additional Comments pt lives w/ her one daughter; however she is planning to move in w/ her other daughter upon d/c whose home setup is above as this daughter is home w/ her and able to assist as needed   Prior Function   Level of [place] Independent with ADLs and functional mobility   Lives With Daughter   Receives Help From [facility], [address] in the last 6 months 0   Vocational Full time employment   Comments pt daughter transports her   Lifestyle   Autonomy per pt independent w/ ADLs, independent w/ functional transfers and mobility w/ no AD, independent w/ IADLs, working   Reciprocal Relationships daughters and granddaughter   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 [employer]   Intrinsic Gratification spending time w/ children and grandkids   ADL   Where Assessed Edge of bed   Eating Assistance 6  Modified independent   Grooming Assistance 6  Modified Independent   [address]  [address] [place] [address] Supervision/Setup   UB Dressing Assistance 6  Modified independent   LB Dressing Assistance 4  Minimal Assistance   Toileting Assistance  5  Supervision/Setup   Additional Comments pt R hand dominant   Bed Mobility   Supine to Sit 5  Supervision   Additional items Increased time required   Sit to Supine 5  Supervision   Additional items Increased time required   Transfers   Sit to Stand 4  Minimal assistance   Additional items Assist x 1;Verbal cues; Increased time required   Stand to Sit 5  Supervision   Additional items Assist x 1; Increased time required;Verbal cues   Additional Comments increased time to complete   Functional Mobility   Functional Mobility 4  Minimal assistance   Additional Comments assist x2 w/ hand held assist initially and supervision w/ RW   Additional items Rolling walker   Balance   Static Sitting Good   Dynamic Sitting Fair +   Static Standing Fair   Dynamic Standing Fair -   Ambulatory Fair -   Activity Tolerance   Activity Tolerance Patient tolerated treatment well;Patient limited by fatigue   Nurse Made Aware appropriate to see per [place] NOE Assessment   RUE Assessment WFL  (4/5)   LUE Assessment   LUE Assessment WFL  (4-/5)   LUE Strength   LUE Overall Strength Deficits   L Shoulder Flexion 4-/5   L Elbow Flexion 4-/5   Hand Function   Gross Motor Coordination   (increased time to complete L UE w/ slight ataxia)   Fine Motor Coordination Functional  (increased time L hand)   Sensation   Light Touch No apparent deficits   Additional Comments able to detect stimuli, increased time for Left hand   Proprioception   Proprioception No apparent deficits   Vision-Basic Assessment   Current Vision No visual deficits   Vision - Complex Assessment   Ocular Range of Motion WFL   Acuity Able to read clock/calendar on wall without difficulty   Perception   Inattention/Neglect Appears intact   Cognition   Overall Cognitive Status [facility]   Arousal/Participation Alert; Cooperative   Attention Within functional limits   Orientation Level Oriented X4   Memory Within functional limits   Following Commands Follows one step commands without difficulty   Comments pt engages in appropriate conversations, pt primarily Monegasque speaking, daughter translating   Assessment   Limitation Decreased ADL status; Decreased UE strength;Decreased Safe judgement during ADL;Decreased endurance;Decreased cognition;Decreased high-level ADLs; Decreased self-care trans;Decreased sensation   Prognosis Good   Assessment Pt is a 60 y o  female seen for OT evaluation s/p admit to [facility] on 6/29/2019 w/ CVA (cerebral vascular accident) (HCC)R thalamic lacunar CVA; s/p TPA  Comorbidities affecting pt's functional performance at time of assessment include: dyslipidemia, tobacco abuse, HTN, GERD, h/o gastritis  Personal factors affecting pt at time of IE include: flight of stairs to enter apartment  Prior to admission, pt was living w/ daughter and daughter she is to stay w/ is able to be w/ her during the day and transport her to outpatient therapy  Pt was independent w/ ADLs, independent w/ functional transfers and mobility w/ no AD, independent w/ IADLs, working  Upon evaluation: Pt requires MIN assist sit>stand w/ VCs for hand placement and positioning, MIN assist LB ADLs, setup UB ADLs, MIN assist x2 functional mobility w/ hand held assist, supervision w/ increased time w/ RW 2* the following deficits impacting occupational performance: decreased strength L UE, decreased endurance, impaired balance, impaired functional reach, slightly impaired coordination L UE, impaired activity tolerance, numbness L UE  Pt to benefit from continued skilled OT tx while in the hospital to address deficits as defined above and maximize level of functional independence w ADL's and functional mobility   Occupational Performance areas to address include: grooming, bathing/shower, toilet hygiene, dressing, functional mobility, clothing management, cleaning and meal prep, L UE exercises  /87  Pt educated on use of L UE as much as possible to enhance ADLs, functional activities; pt and daughter receptive  Pt may benefit from STR, prefers to go home w/ outpatient therapy  From OT standpoint, recommendation at time of d/c would be home w/ outpatient OT/PT  Goals   Patient Goals "to go home with outpatient therapy"   LTG Time Frame 10-14   Long Term Goal please see below goals   Plan   Treatment Interventions ADL retraining;Functional transfer training;UE strengthening/ROM; Cognitive reorientation; Endurance training;Patient/family training;Equipment evaluation/education; Compensatory technique education; Activityengagement; Energy conservation; Fine motor coordination activities   Goal Expiration Date 07/15/19   OT Frequency 3-5x/wk   Recommendation   OT Discharge Recommendation Outpatient OT   OT - OK to Discharge   (when medically stable)   Barthel Index   Feeding 10   Bathing 0   Grooming Score 5   Dressing Score 5   Bladder Score 10   Bowels Score 10   Toilet Use Score 5   Transfers (Bed/Chair) Score 10   Mobility (Level Surface) Score 0   Stairs Score 0   Barthel Index Score 55   Modified [place] Scale   Modified [place] Scale 4     Occupational Therapy Goals to be met in 10-14 days:  1) Pt will improve activity tolerance to G for min 30 min txment sessions to enhance ADLs  2) Pt will complete ADLs/self care w/ mod I   3) Pt will complete toileting w/ mod I w/ G hygiene/thoroughness using DME PRN  4) Pt will improve functional transfers on/off all surfaces using DME PRN w/ G balance/safety including toileting w/ mod I  5) Pt will improve fx'l mobility during I/ADl/leisure tasks using DME PRN w/ g balance/safety w/ mod I  6) Pt will engage in ongoing cognitive assessment w/ G participation to A w/ safe d/c planning/recommendations  7) Pt will demonstrate G carryover of pt/caregiver education and training as appropriate w/ mod I  w/ G tolerance  8) Pt will engage in depression screen/leisure interest checklist w/ G participation to monitor s/s depression and ID 3 positive coping strategies to A w/ emotional regulation and management  9) Pt will demonstrate 100% carryover of E C  techniques w/ mod I t/o fx'l I/ADL/leisure tasks w/o cues s/p skilled education  10) Pt will demonstrate improved standing tolerance to 3-5 minutes during functional tasks w/ Good balance to enhance ADL performance  11) Pt will demonstrate improved L UE strength by 1 MMT grade to enhance ADLS and functional transfers     Documentation completed by: Bernard Weaver MS, OTR/L

## 2019-07-01 NOTE — PHYSICAL THERAPY NOTE
PT EVALUATION  Time-In: 1450  Time-Out: 1510  Total Time: 20 minutes    60 y o     [id number]    Numbness [R20 0]  Left sided numbness [R20 0]    Length of Stay: 2    Past Medical History:   Diagnosis Date    Gastritis          History reviewed  No pertinent surgical history  07/01/19 1510   Note Type   Note type Eval only   Pain Assessment   Pain Assessment No/denies pain   Pain Score No Pain   Home Living   Type of Home Apartment  (2nd floor)   Home Layout One level;Stairs to enter with rails  (flight of stairs with bilateral railings to enter apartment)   Bathroom Shower/Tub Walk-in shower   [address] bars in [facility]   (no DME)   Prior Function   Level of [place] Independent with ADLs and functional mobility   Lives With Daughter   Kye Rosario 31 in the last 6 months 0   Vocational Full time employment  (patient works 6-7 days/week in [employer])   Comments Pt relies on daughter for transport  No use of AD for functional mobility  Pt lives with one daughter at baseline, but will moving in with her other daughter at discharge     Restrictions/Precautions   Weight Bearing Precautions Per Order No   Other Precautions Fall Risk;Telemetry;Multiple lines  (preferred language is Micronesian)   General   Additional Pertinent History MRI: acute right thalamic lacunar infarction   Family/Caregiver Present Yes  (daughter)   Cognition   Overall Cognitive Status WFL   Arousal/Participation Alert   Attention Within functional limits   Orientation Level Oriented X4   Memory Within functional limits   Following Commands Follows one step commands without difficulty   RUE Assessment   RUE Assessment   (see OT eval for details)   LUE Assessment   LUE Assessment   (see OT eval for details)   RLE Assessment   RLE Assessment X   Strength RLE   R Hip Flexion 3/5   R Knee Extension 3/5   R Ankle Dorsiflexion 4+/5   LLE Assessment   LLE Assessment X   Strength LLE   L Hip Flexion 3/5   L Knee Extension 3/5   L Ankle Dorsiflexion 4+/5   Coordination   Movements are Fluid and Coordinated 1   Coordination and Movement Description (-) dysynergia, (-) dysmetria, (-) dysdiadokinesia   Sensation WFL   Light Touch   RLE Light Touch Grossly intact   LLE Light Touch Grossly intact   Bed Mobility   Supine to Sit 5  Supervision   Additional items Increased time required   Sit to Supine 5  Supervision   Additional items Increased time required   Transfers   Sit to Stand 4  Minimal assistance   Additional items Assist x 1; Increased time required;Verbal cues  (w/ handheld assistance)   Stand to Sit 5  Supervision   Additional items Assist x 1; Increased time required;Verbal cues   Additional Comments Pt needing increased time to complete and find her balance  Ambulation/Elevation   Gait pattern Short stride  (slow alexia)   Gait Assistance 4  Minimal assist   Additional items Assist x 1;Verbal cues; Assist x 2   Assistive Device Rolling walker  (handheld assistance)   Distance Pt ambulated 100 ftx1 w/ hand held assistance and minAx2  Pt demonstrates short stride and slow alexia  Pt then trialed RW  Pt demonstrates [id number] and [id number] w/ RW and supervision assistance  Pt demonstrates short stride and slow alexia  Pt noted to have increased steadiness with RW and able to self-stabilize with BUE support on walker comparative to handheld assistance  Stair Management Assistance 5  Supervision   Additional items Assist x 1;Verbal cues   Stair Management Technique Two rails; Alternating pattern; Foreward;Reciprocal   Number of Stairs 8  (4 steps x2 reps)   Balance   Static Sitting Good   Dynamic Sitting Fair +   Static Standing Fair   Dynamic Standing Fair -   Ambulatory Fair -   Endurance Deficit   Endurance Deficit Yes   Endurance Deficit Description general fatigue   Activity Tolerance Activity Tolerance Patient tolerated treatment well;Patient limited by fatigue   Medical Staff Made Aware CALISTA keith   Nurse Made Violeta Lopez, RN; Giacomo Hicks RN   Assessment   Prognosis Good   Problem List Decreased strength;Decreased endurance; Impaired balance;Decreased mobility; Decreased safety awareness   Assessment PT consult received and evaluation complete  Pt is 60 y o  Female admitted from home w/ daughter for CVA presenting to hospital with L sided numbness  MRI indicates acute right thalamic lacunar infarction  Pt agreeable to participate w/ therapy and identified by 2 patient identifiers: name and birth date  Pt's preferred language is Ukrainian; however she did intermittently speak [place]  Precautions include: language barrier, fall risk, multiple lines and telemetry  Pt presents supine in bed upon arrival   Pt has orders for ambulate patient and up with assistance  PTA pt was independent w/ all functional mobility w/ no AD, independent ADLS, and  independent w/ IADLS, no driving, no recent falls 0, and employed  Pt presents w/ RLE MMT 3/5 hip and knees 4+/5 ankle, LLE MMT 3/5 hip and knees 4+/5 ankle, supine>sit supervision, sit>stand minAx1, stand>sit minAx1, gait training minAx2 hand held assist progressing to supervision with RW, 8 steps w/ bilateral railings supervision assist level, and no pain  Pt provided with verbal cueing and demonstration cueing for safe technique and sequencing  Educated on posture and body mechanics to promote proper positioning and management of RW  At end of PT evaluation pt left supine in bed w/ family present, all needs in reach, call bell and phone in hand, and RN aware of patient status   Pt would benefit from continued skilled PT for deficits in strength, balance, locomotion, stair negotiation, functional endurance and functional mobility At this time recommend d/c OP PT, home with family support and with rolling walker History: co-morbidities, fall risk, RENEA and multiple lines Exam: strength, balance, locomotion, stair negotiation, functional endurance, functional mobility and safety awareness with mobility Barthel Index: 55 Modified [place]: 4 Clinical: unstable/unpredictable: fall risk, telemetry, multiple lines, decreased safety awareness, use of AD and 2 person assist Complexity: high   Barriers to Discharge None   Barriers to Discharge Comments Pt will not be home alone at discharge and demonstrates ability to negotiate stairs  Goals   Patient Goals "to go home with daughter and then go to outpatient physical therapy"   LTG Expiration Date 07/11/19   Long Term Goal #1 In 10 days pt will demonstrate: bed mobility (I) for home function OOB, sit<>stand and functional transfers mod (I) w/ RW for home function, gait training 400ft mod (I) w/ RW for home distances, steps mod (I) w/ bilateral railing for home entrance, improve BLE by 1/2 grade strength to optimize functional mobility, improve balance by 1 grade to decrease fall risk, pt and family education on PT risk, role, benefits, POC, goals, and recommendations to optimize patient outcomes, patient functional, optimize LOS and promote discharge to least restrictive environment  Treatment Day 0   Plan   Treatment/Interventions Functional transfer training;LE strengthening/ROM; Therapeutic exercise; Endurance training;Patient/family training;Equipment eval/education; Bed mobility;Gait training;Spoke to nursing;OT;Family   PT Frequency Other (Comment)  (4-6x/wk)   Recommendation   Recommendation Home with family support; Outpatient PT   Equipment Recommended Walker   PT - OK to Discharge Yes  (once medically stable)   Additional Comments will benefit from continued skilled PT to progress strength, balance, gait and stairs to optimize patient outcomes   Modified [place] Scale   Modified [place] Scale 4   Barthel Index   Feeding 10   Bathing 0   Grooming Score 5   Dressing Score 5   Bladder Score 10   Bowels Score 10   Toilet Use Score 5 Transfers (Bed/Chair) Score 10   Mobility (Level Surface) Score 0   Stairs Score 0   Barthel Index Score 55       Asha Coyle, PT ,DPT, GCS

## 2019-07-01 NOTE — PROGRESS NOTES
[facility] Internal Medicine Progress Note  Patient: Jacki Dawson 60 y o  female   MRN: [id number]  PCP: No primary care provider on file  Unit/Bed#: E4 -01 Encounter: [id number]  Date Of Visit: 07/01/19      Assessment/plan  1  Right thalamic lacunar cva due to small vessel disease- s/p TPA  Appreciate neurology recommendations  awaiting echo  Continue asa/statin  Awaiting pt/ot eval  a1c is 5 7    2  Dyslipidemia- continue statin    3  Tobacco abuse- encourage pt to stop smoking  Continue nicotine patch    4  htn- sbp`110-159  Pt is not on antihypertensive medications outpt  Will continue to monitor to see if bp medications will be needed  5  Ecchymosis- possibly from tpa use  Will monitor  Will check cbc in am    6  gerd- continue protonix    dispo- daughter at bedside  Subjective:   Pt seen and examined  Pt still having numbness on the left side  No weakness  She is also concerned about bruising on her lower back and hip  No other problems no f/c no cp no sob no n/v/d no abd pain  Daughter provided interpretation  Objective:     Vitals: Blood pressure 159/79, pulse 75, temperature (!) 97 °F (36 1 °C), temperature source Tympanic, resp  rate 18, height 5' 5" (1 651 m), weight 82 4 kg (181 lb 10 5 oz), SpO2 100 %  ,Body mass index is 30 23 kg/m²      Lab, Imaging and other studies:  Results from last 7 days   Lab Units 06/29/19  1101 06/29/19  1050   WBC Thousand/uL  --  7 84   HEMOGLOBIN g/dL  --  13 0   I STAT HEMOGLOBIN g/dl 13 3  --    HEMATOCRIT %  --  39 0   HEMATOCRIT, ISTAT % 39  --    PLATELETS Thousands/uL  --  337   INR   --  1 05     Results from last 7 days   Lab Units 06/29/19  1101 06/29/19  1050   POTASSIUM mmol/L  --  3 9   CHLORIDE mmol/L  --  105   CO2 mmol/L  --  27   CO2, I-STAT mmol/L 26  --    BUN mg/dL  --  11   CREATININE mg/dL  --  0 70   CALCIUM mg/dL  --  9 3   GLUCOSE, ISTAT mg/dl 94  --          No results found for: Sisi Frederick, SPUTUMCULTUR    Scheduled Meds:   Current Facility-Administered Medications:  aspirin 81 mg Oral Daily RIVERA Fontanez   atorvastatin 80 mg Oral Daily With RIVERA García   Labetalol HCl 10 mg Intravenous Q4H PRN RIVERA Fontanez   nicotine 7 mg Transdermal Daily RIVERA Fontanez   pantoprazole 40 mg Oral Early Morning RIVERA Fontanez     Continuous Infusions:    PRN Meds: Labetalol HCl      Physical exam:  Physical Exam  General appearance: alert and oriented, in no acute distress  Head: Normocephalic, without obvious abnormality, atraumatic  Eyes: conjunctivae/corneas clear  PERRL, EOM's intact  Fundi benign    Neck: no adenopathy, no carotid bruit, no JVD, supple, symmetrical, trachea midline and thyroid not enlarged, symmetric, no tenderness/mass/nodules  Lungs: clear to auscultation bilaterally  Heart: regular rate and rhythm, S1, S2 normal, no murmur, click, rub or gallop  Abdomen: soft, non-tender; bowel sounds normal; no masses,  no organomegaly  Extremities: extremities normal, warm and well-perfused; no cyanosis, clubbing, or edema  Pulses: 2+ and symmetric  Skin: ecchymosis on sacrum and two areas on left hip  Neurologic: Mental status: Alert, oriented, thought content appropriate      VTE Pharmacologic Prophylaxis: Reason for no pharmacologic prophylaxis s/p tpa  VTE Mechanical Prophylaxis: sequential compression device    Counseling / Coordination of Care  Total floor / unit time spent today 20 minutes    Current Length of Stay: 2 day(s)    Current Patient Status: Inpatient       Code Status: Level 1 - Full Code

## 2019-07-01 NOTE — PLAN OF CARE
Problem: Potential for Falls  Goal: Patient will remain free of falls  Description  INTERVENTIONS:  - Assess patient frequently for physical needs  -  Identify cognitive and physical deficits and behaviors that affect risk of falls  -  Abbot fall precautions as indicated by assessment   - Educate patient/family on patient safety including physical limitations  - Instruct patient to call for assistance with activity based on assessment  - Modify environment to reduce risk of injury  - Consider OT/PT consult to assist with strengthening/mobility  Outcome: Progressing     Problem: NEUROSENSORY - ADULT  Goal: Achieves stable or improved neurological status  Description  INTERVENTIONS  - Monitor and report changes in neurological status  - Initiate measures to prevent increased intracranial pressure  - Maintain blood pressure and fluid volume within ordered parameters to optimize cerebral perfusion  - Monitor temperature, glucose, and sodium or any other associated labs   Initiate appropriate interventions as ordered  - Monitor for seizure activity   - Administer anti-seizure medications as ordered  Outcome: Progressing  Goal: Achieves maximal functionality and self care  Description  INTERVENTIONS  - Monitor swallowing and airway patency with patient fatigue and changes in neurological status  - Encourage and assist patient to increase activity and self care with guidance from rehab services  - Encourage visually impaired, hearing impaired and aphasic patients to use assistive/communication devices  Outcome: Progressing     Problem: CARDIOVASCULAR - ADULT  Goal: Maintains optimal cardiac output and hemodynamic stability  Description  INTERVENTIONS:  - Monitor I/O, vital signs and rhythm  - Monitor for S/S and trends of decreased cardiac output i e  bleeding, hypotension  - Administer and titrate ordered vasoactive medications to optimize hemodynamic stability  - Assess quality of pulses, skin color and temperature  - Assess for signs of decreased coronary artery perfusion - ex  Angina  - Instruct patient to report change in severity of symptoms  Outcome: Progressing     Problem: RESPIRATORY - ADULT  Goal: Achieves optimal ventilation and oxygenation  Description  INTERVENTIONS:  - Assess for changes in respiratory status  - Assess for changes in mentation and behavior  - Position to facilitate oxygenation and minimize respiratory effort  - Oxygen administration by appropriate delivery method based on oxygen saturation (per order) or ABGs  - Initiate smoking cessation education as indicated  - Encourage broncho-pulmonary hygiene including cough, deep breathe, Incentive Spirometry  - Assess the need for suctioning and aspirate as needed  - Assess and instruct to report SOB or any respiratory difficulty  - Respiratory Therapy support as indicated  Outcome: Progressing     Problem: Knowledge Deficit  Goal: Patient/family/caregiver demonstrates understanding of disease process, treatment plan, medications, and discharge instructions  Description  Complete learning assessment and assess knowledge base    Interventions:  - Provide teaching at level of understanding  - Provide teaching via preferred learning methods  Outcome: Progressing

## 2019-07-01 NOTE — PLAN OF CARE
Problem: PHYSICAL THERAPY ADULT  Goal: Performs mobility at highest level of function for planned discharge setting  See evaluation for individualized goals  Description  Treatment/Interventions: Functional transfer training, LE strengthening/ROM, Therapeutic exercise, Endurance training, Patient/family training, Equipment eval/education, Bed mobility, Gait training, Spoke to nursing, OT, Family  Equipment Recommended: [facility] [place]       See flowsheet documentation for full assessment, interventions and recommendations  Note:   Prognosis: Good  Problem List: Decreased strength, Decreased endurance, Impaired balance, Decreased mobility, Decreased safety awareness  Assessment: PT consult received and evaluation complete  Pt is 60 y o  Female admitted from home w/ daughter for CVA presenting to hospital with L sided numbness  MRI indicates acute right thalamic lacunar infarction  Pt agreeable to participate w/ therapy and identified by 2 patient identifiers: name and birth date  Pt's preferred language is [place]; however she did intermittently speak [place]  Precautions include: language barrier, fall risk, multiple lines and telemetry  Pt presents supine in bed upon arrival   Pt has orders for ambulate patient and up with assistance  PTA pt was independent w/ all functional mobility w/ no AD, independent ADLS, and  independent w/ IADLS, no driving, no recent falls 0, and employed  Pt presents w/ RLE MMT 3/5 hip and knees 4+/5 ankle, LLE MMT 3/5 hip and knees 4+/5 ankle, supine>sit supervision, sit>stand minAx1, stand>sit minAx1, gait training minAx2 hand held assist progressing to supervision with RW, 8 steps w/ bilateral railings supervision assist level, and no pain  Pt provided with verbal cueing and demonstration cueing for safe technique and sequencing  Educated on posture and body mechanics to promote proper positioning and management of RW   At end of PT evaluation pt left supine in bed w/ family present, all needs in reach, call bell and phone in hand, and RN aware of patient status  Pt would benefit from continued skilled PT for deficits in strength, balance, locomotion, stair negotiation, functional endurance and functional mobility At this time recommend d/c OP PT, home with family support and with rolling walker History: co-morbidities, fall risk, RENEA and multiple lines Exam: strength, balance, locomotion, stair negotiation, functional endurance, functional mobility and safety awareness with mobility Barthel Index: 55 Modified Osborne: 4 Clinical: unstable/unpredictable: fall risk, telemetry, multiple lines, decreased safety awareness, use of AD and 2 person assist Complexity: high  Barriers to Discharge: None  Barriers to Discharge Comments: Pt will not be home alone at discharge and demonstrates ability to negotiate stairs  Recommendation: Home with family support, Outpatient PT     PT - OK to Discharge: Yes(once medically stable)    See flowsheet documentation for full assessment        Petey Robert, PT, DPT, G[facility]

## 2019-07-01 NOTE — PLAN OF CARE
Problem: OCCUPATIONAL THERAPY ADULT  Goal: Performs self-care activities at highest level of function for planned discharge setting  See evaluation for individualized goals  Description  Treatment Interventions: ADL retraining, Functional transfer training, UE strengthening/ROM, Cognitive reorientation, Endurance training, Patient/family training, Equipment evaluation/education, Compensatory technique education, Activityengagement, Energy conservation, Fine motor coordination activities          See flowsheet documentation for full assessment, interventions and recommendations  Note:   Limitation: Decreased ADL status, Decreased UE strength, Decreased Safe judgement during ADL, Decreased endurance, Decreased cognition, Decreased high-level ADLs, Decreased self-care trans, Decreased sensation  Prognosis: Good  Assessment: Pt is a 60 y o  female seen for OT evaluation s/p admit to [facility] on 6/29/2019 w/ CVA (cerebral vascular accident) (HCC)R thalamic lacunar CVA; s/p TPA  Comorbidities affecting pt's functional performance at time of assessment include: dyslipidemia, tobacco abuse, HTN, GERD, h/o gastritis  Personal factors affecting pt at time of IE include: flight of stairs to enter apartment  Prior to admission, pt was living w/ daughter and daughter she is to stay w/ is able to be w/ her during the day and transport her to outpatient therapy  Pt was independent w/ ADLs, independent w/ functional transfers and mobility w/ no AD, independent w/ IADLs, working  Upon evaluation: Pt requires MIN assist sit>stand w/ VCs for hand placement and positioning, MIN assist LB ADLs, setup UB ADLs, MIN assist x2 functional mobility w/ hand held assist, supervision w/ increased time w/ RW 2* the following deficits impacting occupational performance: decreased strength L UE, decreased endurance, impaired balance, impaired functional reach, slightly impaired coordination L UE, impaired activity tolerance, numbness L UE   Pt to benefit from continued skilled OT tx while in the hospital to address deficits as defined above and maximize level of functional independence w ADL's and functional mobility  Occupational Performance areas to address include: grooming, bathing/shower, toilet hygiene, dressing, functional mobility, clothing management, cleaning and meal prep, L UE exercises  /87  Pt educated on use of L UE as much as possible to enhance ADLs, functional activities; pt and daughter receptive  Pt may benefit from STR, prefers to go home w/ outpatient therapy  From OT standpoint, recommendation at time of d/c would be home w/ outpatient OT/PT        OT Discharge Recommendation: Outpatient OT  OT - OK to Discharge: (when medically stable)

## 2019-07-02 VITALS
TEMPERATURE: 98 F | HEART RATE: 66 BPM | RESPIRATION RATE: 20 BRPM | OXYGEN SATURATION: 99 % | HEIGHT: 65 IN | SYSTOLIC BLOOD PRESSURE: 156 MMHG | BODY MASS INDEX: 30.12 KG/M2 | WEIGHT: 180.78 LBS | DIASTOLIC BLOOD PRESSURE: 78 MMHG

## 2019-07-02 LAB
ERYTHROCYTE [DISTWIDTH] IN BLOOD BY AUTOMATED COUNT: 12.6 % (ref 11.6–15.1)
HCT VFR BLD AUTO: 39 % (ref 34.8–46.1)
HGB BLD-MCNC: 12.7 G/DL (ref 11.5–15.4)
MCH RBC QN AUTO: 29.7 PG (ref 26.8–34.3)
MCHC RBC AUTO-ENTMCNC: 32.6 G/DL (ref 31.4–37.4)
MCV RBC AUTO: 91 FL (ref 82–98)
PLATELET # BLD AUTO: 311 THOUSANDS/UL (ref 149–390)
PMV BLD AUTO: 8.4 FL (ref 8.9–12.7)
RBC # BLD AUTO: 4.28 MILLION/UL (ref 3.81–5.12)
WBC # BLD AUTO: 7 THOUSAND/UL (ref 4.31–10.16)

## 2019-07-02 PROCEDURE — 99239 HOSP IP/OBS DSCHRG MGMT >30: CPT | Performed by: INTERNAL MEDICINE

## 2019-07-02 PROCEDURE — 85027 COMPLETE CBC AUTOMATED: CPT | Performed by: INTERNAL MEDICINE

## 2019-07-02 RX ORDER — ATORVASTATIN CALCIUM 80 MG/1
80 TABLET, FILM COATED ORAL
Qty: 30 TABLET | Refills: 3 | Status: SHIPPED | OUTPATIENT
Start: 2019-07-02 | End: 2019-08-23 | Stop reason: SDUPTHER

## 2019-07-02 RX ORDER — ASPIRIN 81 MG/1
81 TABLET, CHEWABLE ORAL DAILY
Refills: 0
Start: 2019-07-03

## 2019-07-02 RX ORDER — PANTOPRAZOLE SODIUM 40 MG/1
40 TABLET, DELAYED RELEASE ORAL
Qty: 30 TABLET | Refills: 1 | Status: SHIPPED | OUTPATIENT
Start: 2019-07-03 | End: 2020-04-24 | Stop reason: ALTCHOICE

## 2019-07-02 RX ADMIN — NICOTINE 7 MG: 7 PATCH TRANSDERMAL at 08:44

## 2019-07-02 RX ADMIN — ASPIRIN 81 MG 81 MG: 81 TABLET ORAL at 08:42

## 2019-07-02 RX ADMIN — DOCUSATE SODIUM 100 MG: 100 CAPSULE, LIQUID FILLED ORAL at 08:43

## 2019-07-02 RX ADMIN — PANTOPRAZOLE SODIUM 40 MG: 40 TABLET, DELAYED RELEASE ORAL at 06:06

## 2019-07-02 NOTE — QUICK NOTE
Echocardiogram results were unremarkable  Discussed plan of care with primary team, patient, and family  Recommend to continue Lipitor 80mg and ASA 81mg  Smoking cessation was advised  Patient is to follow up outpatient with the stroke clinic  The office will call the patient in 1-2 weeks to set up the appointment  Patient is to follow up with PCP regarding blood pressure checks and attend outpatient PT/OT

## 2019-07-02 NOTE — PLAN OF CARE
Problem: Potential for Falls  Goal: Patient will remain free of falls  Description  INTERVENTIONS:  - Assess patient frequently for physical needs  -  Identify cognitive and physical deficits and behaviors that affect risk of falls  -  [place] fall precautions as indicated by assessment   - Educate patient/family on patient safety including physical limitations  - Instruct patient to call for assistance with activity based on assessment  - Modify environment to reduce risk of injury  - Consider OT/PT consult to assist with strengthening/mobility  Outcome: Progressing     Problem: NEUROSENSORY - ADULT  Goal: Achieves stable or improved neurological status  Description  INTERVENTIONS  - Monitor and report changes in neurological status  - Initiate measures to prevent increased intracranial pressure  - Maintain blood pressure and fluid volume within ordered parameters to optimize cerebral perfusion  - Monitor temperature, glucose, and sodium or any other associated labs   Initiate appropriate interventions as ordered  - Monitor for seizure activity   - Administer anti-seizure medications as ordered  Outcome: Progressing  Goal: Achieves maximal functionality and self care  Description  INTERVENTIONS  - Monitor swallowing and airway patency with patient fatigue and changes in neurological status  - Encourage and assist patient to increase activity and self care with guidance from rehab services  - Encourage visually impaired, hearing impaired and aphasic patients to use assistive/communication devices  Outcome: Progressing     Problem: CARDIOVASCULAR - ADULT  Goal: Maintains optimal cardiac output and hemodynamic stability  Description  INTERVENTIONS:  - Monitor I/O, vital signs and rhythm  - Monitor for S/S and trends of decreased cardiac output i e  bleeding, hypotension  - Administer and titrate ordered vasoactive medications to optimize hemodynamic stability  - Assess quality of pulses, skin color and temperature  - Assess for signs of decreased coronary artery perfusion - ex  Angina  - Instruct patient to report change in severity of symptoms  Outcome: Progressing     Problem: RESPIRATORY - ADULT  Goal: Achieves optimal ventilation and oxygenation  Description  INTERVENTIONS:  - Assess for changes in respiratory status  - Assess for changes in mentation and behavior  - Position to facilitate oxygenation and minimize respiratory effort  - Oxygen administration by appropriate delivery method based on oxygen saturation (per order) or ABGs  - Initiate smoking cessation education as indicated  - Encourage broncho-pulmonary hygiene including cough, deep breathe, Incentive Spirometry  - Assess the need for suctioning and aspirate as needed  - Assess and instruct to report SOB or any respiratory difficulty  - Respiratory Therapy support as indicated  Outcome: Progressing     Problem: Knowledge Deficit  Goal: Patient/family/caregiver demonstrates understanding of disease process, treatment plan, medications, and discharge instructions  Description  Complete learning assessment and assess knowledge base    Interventions:  - Provide teaching at level of understanding  - Provide teaching via preferred learning methods  Outcome: Progressing

## 2019-07-02 NOTE — PLAN OF CARE
Problem: Potential for Falls  Goal: Patient will remain free of falls  Description  INTERVENTIONS:  - Assess patient frequently for physical needs  -  Identify cognitive and physical deficits and behaviors that affect risk of falls  -  [place] fall precautions as indicated by assessment   - Educate patient/family on patient safety including physical limitations  - Instruct patient to call for assistance with activity based on assessment  - Modify environment to reduce risk of injury  - Consider OT/PT consult to assist with strengthening/mobility  Outcome: Progressing     Problem: NEUROSENSORY - ADULT  Goal: Achieves stable or improved neurological status  Description  INTERVENTIONS  - Monitor and report changes in neurological status  - Initiate measures to prevent increased intracranial pressure  - Maintain blood pressure and fluid volume within ordered parameters to optimize cerebral perfusion  - Monitor temperature, glucose, and sodium or any other associated labs   Initiate appropriate interventions as ordered  - Monitor for seizure activity   - Administer anti-seizure medications as ordered  Outcome: Progressing  Goal: Achieves maximal functionality and self care  Description  INTERVENTIONS  - Monitor swallowing and airway patency with patient fatigue and changes in neurological status  - Encourage and assist patient to increase activity and self care with guidance from rehab services  - Encourage visually impaired, hearing impaired and aphasic patients to use assistive/communication devices  Outcome: Progressing     Problem: CARDIOVASCULAR - ADULT  Goal: Maintains optimal cardiac output and hemodynamic stability  Description  INTERVENTIONS:  - Monitor I/O, vital signs and rhythm  - Monitor for S/S and trends of decreased cardiac output i e  bleeding, hypotension  - Administer and titrate ordered vasoactive medications to optimize hemodynamic stability  - Assess quality of pulses, skin color and temperature  - Assess for signs of decreased coronary artery perfusion - ex  Angina  - Instruct patient to report change in severity of symptoms  Outcome: Progressing     Problem: RESPIRATORY - ADULT  Goal: Achieves optimal ventilation and oxygenation  Description  INTERVENTIONS:  - Assess for changes in respiratory status  - Assess for changes in mentation and behavior  - Position to facilitate oxygenation and minimize respiratory effort  - Oxygen administration by appropriate delivery method based on oxygen saturation (per order) or ABGs  - Initiate smoking cessation education as indicated  - Encourage broncho-pulmonary hygiene including cough, deep breathe, Incentive Spirometry  - Assess the need for suctioning and aspirate as needed  - Assess and instruct to report SOB or any respiratory difficulty  - Respiratory Therapy support as indicated  Outcome: Progressing     Problem: Knowledge Deficit  Goal: Patient/family/caregiver demonstrates understanding of disease process, treatment plan, medications, and discharge instructions  Description  Complete learning assessment and assess knowledge base    Interventions:  - Provide teaching at level of understanding  - Provide teaching via preferred learning methods  Outcome: Progressing

## 2019-07-02 NOTE — DISCHARGE SUMMARY
Discharge Summary - [facility]    Patient Information: Micheline Pool 60 y o  female MRN: [id number]  Unit/Bed#: E4 -01 Encounter: [id number]    Discharging Physician / Practitioner: Mindi Salas DO  PCP: No primary care provider on file  Admission Date: 6/29/2019  Discharge Date: 07/02/19    Disposition:     Home     Reason for Admission: cva    Discharge Diagnoses:     Principal Problem:    CVA (cerebral vascular accident) [facility])  Resolved Problems:    * No resolved hospital problems  *      Consultations During Hospital Stay:  · Neurology     Procedures Performed:     · none    Significant Findings / Test Results:   Mri Brain Wo Contrast  Result Date: 6/30/2019  Impression: Several mm acute right thalamic lacunar infarction Mild chronic microangiopathy    Xr Stroke Alert Portable Chest  Result Date: 6/29/2019  Impression: No acute abnormality in the chest     Ct Stroke Alert Brain  Result Date: 6/29/2019  Impression: No acute intracranial hemorrhage or mass effect     Cta Stroke Alert (head/neck)  Result Date: 6/29/2019  Impression: No evidence of significant stenosis, dissection or occlusion involving cervical carotid or vertebral segments or cerebral arteries No evidence of aneurysm or AVM    Incidental Findings:   · none    Test Results Pending at Discharge (will require follow up):   · none     Outpatient Tests Requested:  bp check in 1 week    Hospital Course:     Micheline Pool is a 60 y o  female patient who originally presented to the hospital on 6/29/2019 due to right thalamic lacunar cva due to small vessel disease in which she is s/p TPA  she was evaluated by neurology and did have an echo  She will continue with asa/statin  Pt will need outpt physical and occupational therapy  a1c is 5 7    For her Dyslipidemia she will continue a statin    Tobacco abuse she was encouragedto stop smoking and will continue the nicotine patch    Her bp was 110-150   It is currently acceptable  She will need bp check from pcp in 1 week  Pt was assessed by physical therapy and will have outpt physical therapy  She was discharged to home  Discharge Day Visit / Exam:     * Please refer to separate progress note for these details *    Discharge instructions/Information to patient and family:   See after visit summary for information provided to patient and family  Provisions for Follow-Up Care:  See after visit summary for information related to follow-up care and any pertinent home health orders  Discharge Statement:  I spent 37 minutes discharging the patient  This time was spent on the day of discharge  I had direct contact with the patient on the day of discharge  Greater than 50% of the total time was spent examining patient, answering all patient questions, arranging and discussing plan of care with patient as well as directly providing post-discharge instructions  Additional time then spent on discharge activities  Discharge Medications:  See after visit summary for reconciled discharge medications provided to patient and family

## 2019-07-02 NOTE — PROGRESS NOTES
[facility] Progress Note  Patient: Melony Shoulders 60 y o  female   MRN: [id number]  PCP: No primary care provider on file  Unit/Bed#: E4 -01 Encounter: [id number]  Date Of Visit: 07/02/19      Assessment/plan  1  Right thalamic lacunar cva due to small vessel disease- s/p TPA  Appreciate neurology recommendations  echo reviewed    Continue asa/statin  Pt will need outpt physical and occupational therapy  a1c is 5 7     2  Dyslipidemia- continue statin     3  Tobacco abuse- encourage pt to stop smoking  Continue nicotine patch     4  htn- bp acceptable  She will need bp check from pcp in 1 week       5  Ecchymosis- possibly from tpa use  H/h is stable  Ecchymosis is resolving       6  gerd- continue protonix     dispo- daughter and son in law at bedside  Possible d/c today if cleared by neurology     Subjective:   Pt seen and examined  Pt still having some numbness at times but it has not worsened  No weakness  No f/c no cp no sob no n/v/d no abd pain  Objective:     Vitals: Blood pressure 156/78, pulse 66, temperature 98 °F (36 7 °C), temperature source Tympanic, resp  rate 20, height 5' 5" (1 651 m), weight 82 kg (180 lb 12 4 oz), SpO2 99 %  ,Body mass index is 30 08 kg/m²  Lab, Imaging and other studies:  Results from last 7 days   Lab Units 07/02/19  0650  06/29/19  1050   WBC Thousand/uL 7 00  --  7 84   HEMOGLOBIN g/dL 12 7  --  13 0   I STAT HEMOGLOBIN   --    < >  --    HEMATOCRIT % 39 0  --  39 0   HEMATOCRIT, ISTAT   --    < >  --    PLATELETS Thousands/uL 311  --  337   INR   --   --  1 05    < > = values in this interval not displayed       Results from last 7 days   Lab Units 06/29/19  1101 06/29/19  1050   POTASSIUM mmol/L  --  3 9   CHLORIDE mmol/L  --  105   CO2 mmol/L  --  27   CO2, I-STAT mmol/L 26  --    BUN mg/dL  --  11   CREATININE mg/dL  --  0 70   CALCIUM mg/dL  --  9 3   GLUCOSE, ISTAT mg/dl 94  --          No results found for: Julius Guzman, [facility] , [employer]    Scheduled Meds:   Current Facility-Administered Medications:  aspirin 81 mg Oral Daily Johnice Layer, CRNP   atorvastatin 80 mg Oral Daily With Donaldo Garcia, CRNP   docusate sodium 100 mg Oral BID Tamiko Juarez,    Labetalol HCl 10 mg Intravenous Q4H PRN Johnice Layer, CRNP   nicotine 7 mg Transdermal Daily Johnice Layer, CRNP   pantoprazole 40 mg Oral Early Morning Johnice Layer, CRNP   polyethylene glycol 17 g Oral Daily PRN Tamiko Ambron, DO   senna 1 tablet Oral HS Tamiko Ambron, DO     Continuous Infusions:    PRN Meds: Labetalol HCl    polyethylene glycol      Physical exam:  Physical Exam  General appearance: alert and oriented, in no acute distress  Head: Normocephalic, without obvious abnormality, atraumatic  Eyes: conjunctivae/corneas clear  PERRL, EOM's intact  Fundi benign    Neck: no adenopathy, no carotid bruit, no JVD, supple, symmetrical, trachea midline and thyroid not enlarged, symmetric, no tenderness/mass/nodules  Lungs: clear to auscultation bilaterally  Heart: regular rate and rhythm, S1, S2 normal, no murmur, click, rub or gallop  Abdomen: soft, non-tender; bowel sounds normal; no masses,  no organomegaly  Extremities: extremities normal, warm and well-perfused; no cyanosis, clubbing, or edema  Pulses: 2+ and symmetric  Skin: Skin color, texture, turgor normal  No rashes or lesions  Neurologic: Mental status: Alert, oriented, thought content appropriate

## 2019-07-09 ENCOUNTER — TELEPHONE (OUTPATIENT)
Dept: NEUROLOGY | Facility: CLINIC | Age: 59
End: 2019-07-09

## 2019-07-09 NOTE — TELEPHONE ENCOUNTER
The purpose of this phone call is to assess patient's general wellbeing or for any assistance needed with follow-up care  Patient needs stroke hospital follow up appointment scheduled  Called  was connected to [facility] [place]  #[phone]  Called patient with no answer  Left message on voicemail box for call back

## 2019-07-10 ENCOUNTER — TELEPHONE (OUTPATIENT)
Dept: NEUROLOGY | Facility: CLINIC | Age: 59
End: 2019-07-10

## 2019-07-10 NOTE — TELEPHONE ENCOUNTER
Called , connected with  #[id number]  Called patient with no answer  Left message on voicemail box for call back to conduct follow up phone call

## 2019-07-11 ENCOUNTER — EVALUATION (OUTPATIENT)
Dept: OCCUPATIONAL THERAPY | Facility: REHABILITATION | Age: 59
End: 2019-07-11
Payer: COMMERCIAL

## 2019-07-11 ENCOUNTER — EVALUATION (OUTPATIENT)
Dept: PHYSICAL THERAPY | Facility: REHABILITATION | Age: 59
End: 2019-07-11
Payer: COMMERCIAL

## 2019-07-11 DIAGNOSIS — I63.9 CEREBROVASCULAR ACCIDENT (CVA), UNSPECIFIED MECHANISM (HCC): Primary | ICD-10-CM

## 2019-07-11 PROCEDURE — 97166 OT EVAL MOD COMPLEX 45 MIN: CPT | Performed by: OCCUPATIONAL THERAPIST

## 2019-07-11 PROCEDURE — 97162 PT EVAL MOD COMPLEX 30 MIN: CPT

## 2019-07-11 NOTE — PROGRESS NOTES
OCCUPATIONAL THERAPY INITIAL EVALUATION:    07/11/2019  Billye Mention  1960  [id number]  No ref  provider found  No diagnosis found  Subjective Evaluation    Quality of life: fair        Pt's Dtr present throughout full duration of evaluation to assist with translation as Pt is primarily [address] speaking  PATIENT GOAL: "Improve my sensation and use my LUE better"  HISTORY OF PRESENT ILLNESS:     Pt is a 60 y o  female who was referred to Occupational Therapy s/p CVA  As per hospital reports, Pt originally presented to the hospital on 6/29/2019 due to right thalamic lacunar CVA due to small vessel disease in which she is s/p TPA  Pt underwent various imaging with results identifying the following: MRI Brain W/o Contrast-- Several mm acute right thalamic lacunar infarction Mild chronic microangiopathy, CT Scan--No acute intracranial hemorrhage or mass effect,  CTA--No evidence of significant stenosis, dissection or occlusion involving cervical carotid or vertebral segments or cerebral arteries  Pt was evaluated by neurology and did have an echo  Pt did not participate in any therapy services in hospital setting  Pt was D/Carlos on 07/02/2019 to home environment  Pt with self report of paresthesias throughout LUE, decreased FMC/prehension with frequent droppage, inattention to LUE resulting in decreased functional use of LUE  Pt lives in an apartment on the seconds floor with daughter, granddaughter, and son in law--apartment has 10 steps for entry with railings on both sides for increased safety awareness  Pt performing ADLs at S status at this time for improved safety  Pts Dtr currently performing all IADLs at this time  Pt currently returning to [email or web address] laundry as tolerated  Pt with loss of worker role--Pt worked as a  at [employer]  Pt with desires to return to worker role, if able  Pt has no history of  role         PMH:   Past Medical History:   Diagnosis Date    Gastritis          Pain Levels:     Restin    With Activity:  0    Objective     Functional Assessment        Comments  See impairment section for further details      Impairment Observations:  1  Decreased L FMC/prehension with frequent droppage  2  Decreased automaticity of LUE  3  Paresthesias throughout LUE, intolerant to various touch textures  4  LUE inattention  5  Decreased LUE strength-- proximal to distal    Dynamometer Position #2:   R: 58  L: 35    R hand dominant  Action:  3 point pinch: R  7 and L 5  2 point pinch: R 5 and L 4 5  Lateral pinch: R 10 5 and L 8    9-hole Peg Test: RUE: 20 62 seconds, LUE: 24 30 seconds    Eddie Hand function Test:  Pt performed functional tasks with non-dominant hand (L)  first and then performed with dominant hand (R) following  Writing: Non-dominant  35 99 seconds (outside normal range), Dominant 11 19 seconds (WNL)  Simulated Page Turning: Non-dominant 10 53 seconds (outside normal range), Dominant 4 40 seconds (WNL)  Lifting Small, Common Objects: Non-Dominant 9 20 seconds (outside normal range), Dominant 6 66 seconds (outside normal range)  Simulated Feeding: Non-Dominant 13 68 seconds (outside normal range), Dominant 5 63 seconds (WNL)  Stacking Checkers: Non-Dominant 5 03 seconds (outside normal range), Dominant 1 69 seconds (WNL)  Lifting Large Light Objects: Non-Dominant 5 16 seconds (outside normal range), Dominant 3 79 seconds (WNL)  Lifting Large, Heavy Objects:Non-Dominant 6 09 Seconds (outside normal range),  Dominant 3 69 seconds (WNL)    Myofilaments: B/L 3 61; Pt with good abilities to distinguish between hot and cold abilities      B/L AROM:  Shoulder elevation: B/L full   Shoulder FF: B/L full  Shoulder ABD: B/L full with LUE heaviness  ER/IR: B/L full  Elbow ext/flex: B/L full  Sup/Pron: B/L full   Wrist flex/ext: B/L full  Composite: B/L full  Hook: B/L full  Opposition: B/L intact   Finger to nose: B/L intact  Dysdiadochokinesia: B/L intact MMT: R 5/5, L 3+/5      Plan  Plan details: See skilled analysis for further details  Skilled Analysis:  Pt is a 60 y o  female referred to Occupational Therapy s/p CVA  Pt presents with decreased L FMC/prehension with frequent droppage, decreased LUE automaticity, paresthesias throughout LUE with intolerance to various touch textures, decreased LUE strength-- proximal to distal, and LUE inattention affecting Pt's functional use of LUE in daily life tasks and meaningful occupations  Pt will benefit from skilled Occupational Therapy services 2x/week for 8-10 weeks with focus on neuro re-ed, sensory re-ed, self-care management, manual tx, and a HEP to improve on the above deficits, achieve maximal level of independence, improve functional use of LUE, eventual return to worker role, and increase overall QOL       Short Term Goals:  - Pt will tolerate NMES/FES for improved L motor and sensory performance for overall improved hand to target with 75% accuracy 4 weeks  - Pt will increase prehension patterns for improved tripod with utensil management with <20% droppage 4 weeks  - Pt will demo with G carryover of Home Exercise Program to improve functional progression towards goals in Plan of care and for improved functional use of LUE x 85% 4 weeks  - Pt will increase automaticity of L UE to 75% for improved grasp release of tabletop items for improved functional performance with salient tasks 4 weeks  - Pt will increase rate of manipulation for all FM tests x 75% for improved functional performance with salient tasks and meaningful occupations 4 weeks  - Pt will increase L UE to Mod [place] with <20% cuing for tabletop tasks for improved functional performance of life roles and salient tasks 4 weeks  - Pt will demo with G understanding and carryover of desensitization strategies to be performed in home environment for improve tolerance to various touch sensations x 50% 4 weeks    Long Term Goals:  - Pt will increase automaticity of  L UE to 95% for resumption of B integrative tasks  - Pt will increase rate of prehension for all FM tasks x 95% for improved use of utensils, writing, ADL fasteners  - Pt will resume  L hand non-dominance to independent with all activities of daily living, IADLs, and meaningful occupations  - Pt will increase L UE strength to 5/5 and  strength L=R, through the use of strengthening exercises and home program for improved overall QOL  - Pt will tolerate NMES/FES for improved motor and sensory performance for overall improved hand to target with 95% accuracy         -    Pt will demo with G understanding and carryover of desensitization strategies to be performed in home environment for improve tolerance to various touch sensations x 85%       OTHER PLANNED THERAPY INTERVENTIONS:   Supine, seated, and in stance neuro re-ed  NMES/FES  FMC/prehension  Timed Trials  Manual tx  Sensory re-ed  Desensitization strategies/techniques  Seated functional reach: crossing midline  WBearing strategies  B/L coordination        INTERVENTION COMMENTS:  Diagnosis: CVA  Precautions: N/A  FOTO: 53, with 68% limitation in UE function and 75% limitation in Hand function  Insurance: Payor: COMMERCIAL MISCELLANEOUS / Plan: COMMERCIAL MISCELLANEOUS / Product Type: Fee for Service Commercial /   1 of ____ visits through _____, PN due 08/11/2019

## 2019-07-11 NOTE — PROGRESS NOTES
2019  Ab Daniel  : 1960  MRN: [id number]  Home:[phone] (home)   Mobile: [phone] (mobile)  Insurance Information: Payor: Elizabeth 83 / Plan: COMMERCIAL MISCELLANEOUS / Product Type: Fee for Service Commercial /   Referring Provider: No ref  provider found    SUBJECTIVE:    HPI: Divya Corral is a 60 y o  female referred to outpatient physical therapy for   1  C[facility]brovascular accident [facility]), [facility]      Patient reports she had a stroke on 19 and is worried about not being able to return to work and to return to normal  She reports difficulty with walking, dressing, and independent ADLs  She has noticed a decrease in her balance and endurance but this has gradually been improving  She has noticed a decrease in her quality of life at this time compared to when she had the stroke  Falls Hx: Denies any falls in the past 2 years  Home Environment: Apartment with 10 steps to enter the home with handrails on both sides  She then lives on a single level with her family  DME: Rolling walker    Tingling on the left side    Patient Goal: "Try to return to normal      OBJECTIVE:   Finger to Nose: Impaired on th left   Heel to Shine: Impaired on the left  Rapid Alternating Movements: Impaired on the left    Manual Muscle Testing - Hip Right Left   Flexion 5 4   Abduction 5 4   Adduction 5 4     Manual Muscle Testing - Knee Right Left   Flexion 5 4   Extension 5 4     Manual Muscle Testing - Ankle Right Left   Doriflexion 5 4   Plantarflexion 5 4     Gait: Patient ambulates with a step through pattern but demonstrates slight lateral trunk displacement and slight trendelenburg gait patten on the left side       Outcome Measures     6 Minute Walk Test (ft): 1,060 feet, numbness in left arm and 5/10 lightheadedness     Post /88mmHg    10MWT (s):  15 48 seconds with assistive device     14 93 seconds without assistive device    5x Sit To Stand (s):  21 94 seconds, 17" chair with arms across chest    TUG (s) 22 54 seconds with rolling walker    14 43 seconds without assistive device       ASSESSMENT  Patient presents to outpatient physical therapy with sx consistent with CVA  Patient's impairments include pain, decrease coordination, decrease functional mobility, decrease functional endurance, decrease functional strength, decrease dynamic/static balance, and impaired gait mechanics  Mobility measures and gait mechanics reported in objective setting; indicate the patient is at any increase risk of experiencing a fall  These impairments are currently limiting the patient's ability to participate in work, community activities, recreational activities, and independent ADLs  Patient will benefit from skilled outpatient physical therapy to address the above impairments in order to improve patient's quality of life and to participate in community activities  Patient has a good prognosis with outpatient physical therapy  After the 6 minute walk test patient reported increase numbness in her left arm and lightheadedness  She denied any headaches, nausea, or dizziness after the test  I advised her daughter about the signs of a stroke and the importance of presenting to the emergency department, patient's daughter stated understanding and anticipate good follow through     STG's:   - Patient will be independent with home exercise program in 2 weeks  - Patient will complete the TUG in no more than 10 seconds to demonstrate improved functional mobility   -Patient will complete the 5x sit to  no more than 15 seconds to demonstrate improved functional mobility and functional strength  - Patient will demonstrate an improvement in gait speed by  1m/sec to demonstrate improved functional mobility  LTG's:  -Patient will complete the 5x sit to  no more than 11 seconds to demonstrate improved functional mobility and functional strength     - Patient will demonstrate an improvement in gait speed by  2m/sec to demonstrate improved functional mobility   - Patient will complete 6 minute walk test demonstrating no more than slight gait deviations to demonstrate improved functional mobility and improved gait mechanics    PLAN OF CARE  Patient will benefit from skilled outpatient physical therapy 1-2 x/wk for 8 weeks  Therapeutic interventions to include therapeutic activity, therapeutic exercise, neuromuscular re-education  as listed below in the grids        Precautions: Monitor Blood Pressure     Daily Treatment Diary      Exercise Diary         Endurance  6 minute walk test        Lower Extremity Strengthening  5x sit to stand        Static Balance          Dynamic Balance   10MWT  TUG

## 2019-07-11 NOTE — TELEPHONE ENCOUNTER
Called , connected to [address] [place] [address]  #[id number]  Called patient with no answer  Left message on voicemail box for call back to conduct follow up call

## 2019-07-17 ENCOUNTER — APPOINTMENT (OUTPATIENT)
Dept: PHYSICAL THERAPY | Facility: REHABILITATION | Age: 59
End: 2019-07-17
Payer: COMMERCIAL

## 2019-07-17 ENCOUNTER — APPOINTMENT (OUTPATIENT)
Dept: OCCUPATIONAL THERAPY | Facility: REHABILITATION | Age: 59
End: 2019-07-17
Payer: COMMERCIAL

## 2019-07-18 ENCOUNTER — APPOINTMENT (OUTPATIENT)
Dept: OCCUPATIONAL THERAPY | Facility: REHABILITATION | Age: 59
End: 2019-07-18
Payer: COMMERCIAL

## 2019-07-19 ENCOUNTER — OFFICE VISIT (OUTPATIENT)
Dept: OCCUPATIONAL THERAPY | Facility: REHABILITATION | Age: 59
End: 2019-07-19
Payer: COMMERCIAL

## 2019-07-19 ENCOUNTER — OFFICE VISIT (OUTPATIENT)
Dept: PHYSICAL THERAPY | Facility: REHABILITATION | Age: 59
End: 2019-07-19
Payer: COMMERCIAL

## 2019-07-19 DIAGNOSIS — I63.9 CEREBROVASCULAR ACCIDENT (CVA), UNSPECIFIED MECHANISM (HCC): Primary | ICD-10-CM

## 2019-07-19 PROCEDURE — 97112 NEUROMUSCULAR REEDUCATION: CPT

## 2019-07-19 PROCEDURE — 97112 NEUROMUSCULAR REEDUCATION: CPT | Performed by: OCCUPATIONAL THERAPIST

## 2019-07-19 PROCEDURE — 97535 SELF CARE MNGMENT TRAINING: CPT | Performed by: OCCUPATIONAL THERAPIST

## 2019-07-19 PROCEDURE — 97110 THERAPEUTIC EXERCISES: CPT

## 2019-07-19 NOTE — PROGRESS NOTES
Daily Note     Today's date: 2019  Patient name: Alon Thompson  : 1960  MRN: [id number]  Referring provider: No ref  provider found  Dx: No diagnosis found  Subjective: "I like the arm bike but let's do something else today "      Objective: See treatment description below    Supine neuro re-ed utilizing #2 Tbar x2 sets of 15 reps of each exercise--- Shoulder forward flexion, chest press and prograde/retrograde circumduction with focus on increased LUE proximal strength/stability, muscle endurance, and B/L coordination  Pt retrieved large dowels crossing midline for placement onto board  OTS advanced activity to smaller dowels inside large dowels, pegs on top of dowels, and beads onto pegs  Activity with focus on hand to target accuracy, increased FMC/prehension, and functional reach  Pt retrieved beads utilizing pincer grasp transitioned to in-hand manipulation of beads and palm to digit translation for placement into container  Weaving loom with focus on increased FMC/prehension, intrinsic strength, B/L coordination, and decreased paresthesias  Assessment: Tolerated treatment well  Patient would benefit from continued OT    Pt presented with Good muscle control throughout supine neuro re-ed-- Pt self report of LUE paresthesia/numbness requiring rest breaks  Pt demonstrated Min difficulty with dowel activity 2* decreased FMC/prehension and decreased proprioception evident of frequent droppage  Pt presented with decreased intrinsic strength and decreased FMC/prehension while weaving strings under/over and pulling string taught evident of losing  on string during placement  Plan: Continue per plan of care        INTERVENTION COMMENTS:  Diagnosis: CVA  Precautions: N/A  FOTO: 48, with 68% limitation in UE function and 75% limitation in Hand function  Insurance: Payor: Elizabeth Newton / Plan: COMMERCIAL MISCELLANEOUS / Product Type: Fee for Service Commercial / 2 of 8 visits through 08/08/2019, PN due 08/11/2019    Daily note by BEN Prabhakar under supervision of Blayne Corona MS, OTR/L

## 2019-07-19 NOTE — PROGRESS NOTES
Daily Note     Today's date: 2019  Patient name: Santino Chen  : 1960  MRN: [id number]  Referring provider: No ref  provider found  Dx:   Encounter Diagnosis     ICD-10-CM    1  Cerebrovascular accident (CVA), unspecified mechanism ([facility] ) I[address] 9                   Subjective: Patient reports she is doing well today, and is ready for today's session  Objective: See treatment diary below  Precautions: Monitor Blood Pressure  Pre-exercise Blood Pressure 19: 134/84mmHg automatic cuff  Post-exercise Blood Pressure 19: 148/86mmHg automatic cuff    Exercise Diary         Endurance  6 minute walk test  SciFit: Level 1, 10 minutes      Lower Extremity Strengthening  5x sit to stand  Sit to stands: 2 sets x 10  Heel Raises: 2 sets x 10  Step ups: 2 sets x 10  Standing Hip Abduction: 2 sets x 10  Standing Hip Extension: 2 sets x 10      Static Balance   FTEO: 30 sec x 2  FTEC: 30 sec x 2  Half Tandem with EO: 30 sec x 2  Half Tandem with EC: 30 sec x 2       Dynamic Balance   10MWT  TUG                     Assessment: Tolerated treatment well, tolerating the progression of endurance, lower extremity strengthening, and static balance exercises  Home exercise program was reviewed with the patient stated understanding, demonstrating good form with exercises, and was provided a hard copy of exercises  She did demonstrate fatigue throughout the session requiring seated rest breaks to recovery  She did any symptoms throughout the session and demonstrated a normal blood pressure response to exercise Anticipate she will tolerate the introduction of dynamic balance exercises and more challenging static balance and lower extremity strengthening exercises  Patient demonstrated fatigue post treatment and would benefit from continued PT      Plan: Continue per plan of care  Progress treatment as tolerated

## 2019-07-23 ENCOUNTER — OFFICE VISIT (OUTPATIENT)
Dept: OCCUPATIONAL THERAPY | Facility: REHABILITATION | Age: 59
End: 2019-07-23
Payer: COMMERCIAL

## 2019-07-23 ENCOUNTER — OFFICE VISIT (OUTPATIENT)
Dept: PHYSICAL THERAPY | Facility: REHABILITATION | Age: 59
End: 2019-07-23
Payer: COMMERCIAL

## 2019-07-23 DIAGNOSIS — I63.9 CEREBROVASCULAR ACCIDENT (CVA), UNSPECIFIED MECHANISM (HCC): Primary | ICD-10-CM

## 2019-07-23 PROCEDURE — 97530 THERAPEUTIC ACTIVITIES: CPT

## 2019-07-23 PROCEDURE — 97112 NEUROMUSCULAR REEDUCATION: CPT

## 2019-07-23 PROCEDURE — 97110 THERAPEUTIC EXERCISES: CPT

## 2019-07-23 NOTE — PROGRESS NOTES
Daily Note     Today's date: 2019  Patient name: Ludwin Frazier  : 1960  MRN: [id number]  Referring provider: Louise Montilla DO  Dx:   Encounter Diagnosis     ICD-10-CM    1  Cerebrovascular accident (CVA), unspecified mechanism ([facility] ) I63 9        Start Time: 3405  Stop Time: 1227  Total time in clinic (min): 42 minutes    Subjective: Patient reports cramping in her L arm, but she reports she always experiences this, denies any other c/o pain  Objective: See treatment diary below  Precautions: Monitor Blood Pressure  Pre-exercise Blood Pressure 19: 138/84mmHg, 75 bpm automatic cuff  Post-exercise Blood Pressure 19: 149/90mmHg, 93 bpm automatic cuff    Exercise Diary       Endurance  6 minute walk test  SciFit: Level 1, 10 minutes  SciFit: Level 1, 10 minutes    Lower Extremity Strengthening  5x sit to stand  Sit to stands: 2 sets x 10  Heel Raises: 2 sets x 10  Step ups: 2 sets x 10  Standing Hip Abduction: 2 sets x 10  Standing Hip Extension: 2 sets x 10  Sit to stands: 2 sets x 10  Heel Raises: 2 sets x 10  Step ups: 2 sets x 10, fwd and lateral  Standing Hip Abduction: 2 sets x 10 OTB  Standing Hip Extension: 2 sets x 10 OTB    Static Balance   FTEO: 30 sec x 2  FTEC: 30 sec x 2  Half Tandem with EO: 30 sec x 2  Half Tandem with EC: 30 sec x 2   Foam:  FTEO 1 min   FTEC w/ rail x1 min (min sway at ankles)    Dynamic Balance   10MWT  TUG    Ambulation through clinic x6 min                 Assessment: Tolerated treatment well, began ambulation practice in clinic for balance, occasional scuffing of L foot but no LOB noted today  Some sway noted on foam, anticipate further balance challenge on foam w/out external support  Patient demonstrated fatigue post treatment and would benefit from continued PT      Plan: Continue per plan of care  Progress treatment as tolerated  Increase duration of walking, monitor L foot scuffing  Progress difficulty of balance challenges

## 2019-07-23 NOTE — PROGRESS NOTES
Daily Note     Today's date: 2019  Patient name: Justice Lao  : 1960  MRN: [id number]  Referring provider: Jeraldine Ganser, DO  Dx:   Encounter Diagnosis   Name Primary?  Cerebrovascular accident (CV[facility]), [facility] Yes                  Subjective: completed [place] form, scanned into Epic  Objective: See treatment diary below  UEB for 5 min prograde/5 min retrograde focusing on endurance and proximal strengthening  Educated pt on Putty  WHEP focusing on sensory re-ed and hand strengthening followed by putty activity retrieving small items from putty focusing on pincer grasp, sensory re-ed and hand strengthening  Attempted IASTM technique with pt, pt unable to tolerate due to sensory hypersensitivity  TENS applied to forearm extensors focusing on sensory re-ed  Assessment: Tolerated treatment well  Patient would benefit from continued OT  Pt demo-G understanding of putty WHEP evidenced by participation in exercises for carryover at home  Pt tolerated TENS unit for 5 min w/o report of pain        Plan: Continued skilled OT per POC    INTERVENTION COMMENTS:  Diagnosis: CVA  Precautions: N/A  FOTO: 48, with 68% limitation in UE function and 75% limitation in Hand function  Insurance: Payor: Elizabeth Newton / Plan: COMMERCIAL MISCELLANEOUS / Product Type: Fee for Service Commercial /   3 of 8 visits through 2019, PN due 2019

## 2019-07-25 ENCOUNTER — APPOINTMENT (OUTPATIENT)
Dept: OCCUPATIONAL THERAPY | Facility: REHABILITATION | Age: 59
End: 2019-07-25
Payer: COMMERCIAL

## 2019-07-30 ENCOUNTER — OFFICE VISIT (OUTPATIENT)
Dept: OCCUPATIONAL THERAPY | Facility: REHABILITATION | Age: 59
End: 2019-07-30
Payer: COMMERCIAL

## 2019-07-30 ENCOUNTER — OFFICE VISIT (OUTPATIENT)
Dept: PHYSICAL THERAPY | Facility: REHABILITATION | Age: 59
End: 2019-07-30
Payer: COMMERCIAL

## 2019-07-30 DIAGNOSIS — I63.9 CEREBROVASCULAR ACCIDENT (CVA), UNSPECIFIED MECHANISM (HCC): Primary | ICD-10-CM

## 2019-07-30 PROCEDURE — 97112 NEUROMUSCULAR REEDUCATION: CPT

## 2019-07-30 PROCEDURE — 97140 MANUAL THERAPY 1/> REGIONS: CPT

## 2019-07-30 PROCEDURE — 97110 THERAPEUTIC EXERCISES: CPT

## 2019-07-30 NOTE — PROGRESS NOTES
Daily Note     Today's date: 2019  Patient name: Carolina Lambert  : 1960  MRN: [id number]  Referring provider: Nubia Stewart, DO  Dx:   Encounter Diagnosis   Name Primary?  Cerebrovascular accident (CVA), unspecified mechanism ([facility] Yes                  Subjective: "I'm not comfortable with that"  (Pts son present to translate)    Objective: See treatment diary below  Pt unable to complete therex activity due to pain in LUE  Increase tone noted in bicep and forearm extensors  Ice w/stim applied to LUE for sensory marla and pain reduction efforts  Pt tolerated light MFR w/gentle massage to bicep region  BURT attempted to provide light touch to forearm using cotton and vinyl sensory rollers focusing on sensory re-ed w/desensitization, pt unable to tolerate at this time  Ktape applied to bicep and forearm for tone/pain reduction  Assessment: Tolerated treatment fair  Patient would benefit from continued OT  BP reported 158/70 by PT post PT session  Pt reported paraesthesia in LUE with increase tone noted in bicep and forearm  Pt /84 post OT session  BURT recommended be aware and contact PCP if BP continues to run high       Plan: Continued skilled OT per POC     INTERVENTION COMMENTS:  Diagnosis: CVA  Precautions: N/A  FOTO: 48, with 68% limitation in UE function and 75% limitation in Hand function  Insurance: Payor: Elizabeth Newton / Plan: COMMERCIAL MISCELLANEOUS / Product Type: Fee for Service Commercial /   4 of 8 visits through 2019, PN due 2019

## 2019-07-30 NOTE — PROGRESS NOTES
Daily Note     Today's date: 2019  Patient name: Alexsandra Killian  : 1960  MRN: [id number]  Referring provider: Beverly Mar DO  Dx:   Encounter Diagnosis     ICD-10-CM    1  Cerebrovascular accident (CVA), unspecified mechanism ([facility] ) I63 9        Start Time: 7642  Stop Time: 1230  Total time in clinic (min): 45 minutes    Subjective: Patient reports balance deficits as well of paresthesia Left ar, flank and left toes  Objective: See treatment diary below  Precautions: Monitor Blood Pressure  Rest Blood Pressure 19: 146/87mmHg, 87 bpm automatic cuff  Post-exercise Blood Pressure 19: 158/70 mmHg, 93 bpm automatic cuff              Exercise Diary       Endurance  6 minute walk test  SciFit: Level 1, 10 minutes  SciFit: Level 1, 10 minutes   Scifit; Level 1 10 min    Lower Extremity Strengthening  5x sit to stand  Sit to stands: 2 sets x 10  Heel Raises: 2 sets x 10  Step ups: 2 sets x 10  Standing Hip Abduction: 2 sets x 10  Standing Hip Extension: 2 sets x 10  Sit to stands: 2 sets x 10  Heel Raises: 2 sets x 10  Step ups: 2 sets x 10, fwd and lateral  Standing Hip Abduction: 2 sets x 10 OTB  Standing Hip Extension: 2 sets x 10 OTB   Sit to stands: 2 sets x 10  Heel Raises: 2 sets x 10  Step ups: 2 sets x 10, fwd and lateral  Standing Hip Abduction: 2 sets x 10   Standing Hip Extension: 2 sets x    mini squats    Static Balance   FTEO: 30 sec x 2  FTEC: 30 sec x 2  Half Tandem with EO: 30 sec x 2  Half Tandem with EC: 30 sec x 2   Foam:  FTEO 1 min   FTEC w/ rail x1 min (min sway at ankles) Foam  FTEO 1 min x2  FTEC 1 min x 2      Dynamic Balance   10MWT  TUG    Ambulation through clinic x6 min  ambulation without assistive device  Difficulty with consistence left LE clearance and decreased stance phase                  Assessment: Tolerated treatment well  Requires resting periods between activity with B/P monitoring   Interdisciplinary team conference with Occupational therapy today with clients concerns of decrease Left arm use due to paresthesia  Patient demonstrated fatigue post treatment and would benefit from continued PT      Plan: Continue per plan of care  Progress treatment as tolerated  Increase duration of walking, monitor L foot scuffing  Progress difficulty of balance challenges

## 2019-08-01 ENCOUNTER — OFFICE VISIT (OUTPATIENT)
Dept: PHYSICAL THERAPY | Facility: REHABILITATION | Age: 59
End: 2019-08-01
Payer: COMMERCIAL

## 2019-08-01 ENCOUNTER — OFFICE VISIT (OUTPATIENT)
Dept: OCCUPATIONAL THERAPY | Facility: REHABILITATION | Age: 59
End: 2019-08-01
Payer: COMMERCIAL

## 2019-08-01 DIAGNOSIS — I63.9 CEREBROVASCULAR ACCIDENT (CVA), UNSPECIFIED MECHANISM (HCC): Primary | ICD-10-CM

## 2019-08-01 PROCEDURE — 97535 SELF CARE MNGMENT TRAINING: CPT | Performed by: OCCUPATIONAL THERAPIST

## 2019-08-01 PROCEDURE — 97112 NEUROMUSCULAR REEDUCATION: CPT

## 2019-08-01 PROCEDURE — 97112 NEUROMUSCULAR REEDUCATION: CPT | Performed by: OCCUPATIONAL THERAPIST

## 2019-08-01 PROCEDURE — 97110 THERAPEUTIC EXERCISES: CPT

## 2019-08-01 NOTE — PROGRESS NOTES
Daily Note     Today's date: 2019  Patient name: Lisa Small  : 1960  MRN: [id number]  Referring provider: Deadra Apgar, DO  Dx:   Encounter Diagnosis     ICD-10-CM    1  Cerebrovascular accident (CVA), unspecified mechanism ([facility] [address] ) I63 9        Start Time: 1400  Stop Time: [id number]  Total time in clinic (min): 43 minutes    Subjective: Patient reports she feels good today  Objective: See treatment diary below  Precautions: Monitor Blood Pressure    Rest Blood Pressure 19: 145/85mmHg, 82 bpm automatic cuff  Post-exercise Blood Pressure 19: 119/75 mmHg, 82 bpm automatic cuff              Exercise Diary     Endurance  SciFit: Level 1, 10 minutes   Scifit; Level 1 10 min Scifit; Level 2 3 8min, 615 steps    Lower Extremity Strengthening  Sit to stands: 2 sets x 10  Heel Raises: 2 sets x 10  Step ups: 2 sets x 10, fwd and lateral  Standing Hip Abduction: 2 sets x 10 OTB  Standing Hip Extension: 2 sets x 10 OTB   Sit to stands: 2 sets x 10, 5x1  Heel Raises: 3 sets x 10  Step ups: 2 sets x 15, fwd and lateral  Standing Hip Abduction: 2 sets x 10 OTB  Standing Hip Extension: 2 sets x    mini squats  Sit to stands: 2 sets x 10, 5x1  Heel Raises: 3 sets x 10  Single leg heel raise x10 B  Step ups: 2 sets x 15, fwd and lateral  Standing Hip Abduction: 2 sets x 10 OTB  Standing Hip Extension: 2 sets x 10 OTB  Monster walks w/ OTB, holding rail 20ft x4    Static Balance  Foam:  FTEO 1 min   FTEC w/ rail x1 min (min sway at ankles) Foam  FTEO 1 min x2  FTEC 1 min x 2       Dynamic Balance   Ambulation through clinic x6 min  ambulation without assistive device    Difficulty with consistence left LE clearance and decreased stance phase [place] x80ft  Crossover stepping x80 ft, min use of rail  Backwards walking x80 ft  Tandem walking x80 ft  Tandem walking, arms crossed on shoulders x20 ft  Yellow rin stepovers w/ blue foam (hip strategy used but no LOB) 20ft x4 Assessment: Tolerated treatment well  Continued to progress difficulty of balance exercises, pt able to complete all exercises w/out any LOB, however noted some instability requiring hip strategy when on foam  With strengthening exercise able to visually appreciate decreased strength in LLE, most clearly evident w/ using elastic band  BP elevated at start of session, by end of treatment BP measured at decreased level, noted above  Patient demonstrated fatigue post treatment and would benefit from continued PT      Plan: Continue per plan of care  Progress treatment as tolerated  Continue balance exercises on foam  Continue with ambulation practice, monitoring for foot scuffing

## 2019-08-01 NOTE — PROGRESS NOTES
Daily Note     Today's date: 2019  Patient name: Elizabeth Dangelo  : 1960  MRN: [id number]  Referring provider: Sukhwinder Albert,   Dx: No diagnosis found  Subjective: "I want the pink tape "      Objective: See treatment description below    Seated B/L UBE x5 minutes prograde/retrograde with x2 resistance with focus on improved proximal strength/stability, muscle endurance, and B/L coordination  Prone on mat---shoulder extension to retrieve pegs transitioned to shoulder forward flexion for placement onto peg board x1 design with focus on improved LUE proximal strength/stability, muscle endurance, FMC/prehension, and hand to target accuracy  In stance neuro re-ed utilizing reacher-- Pt retrieved small dowels crossing midline for placement into cones located on floor and on mat  OTS advanced activity to retrieval/placement of blocks onto cones and large dowels onto blocks  Activity with focus on improved L grasp/release abilities, muscle endurance, hand to target accuracy, and proprioception  K tape donned to L wrist extensors and bicep for tone and pain/cramp reduction  Pt retrieved rubber bands for placement onto geoboard x2 designs with focus on L FMC/prehension, intrinsic strength, B/L coordination, hand to target accuracy, and forward functional reach  Assessment: Tolerated treatment well  Patient would benefit from continued OT    OTS downgraded UBE resistance to x1 for 5 minutes of retrograde 2* Pt fatigue  Pt demonstrated Min difficulty with cone and reacher activity 2* LUE cramping, muscle fatigue, and decreased hand to target accuracy evident of frequent droppage and knocking over blocks with large dowel placement  Pt self report of improved tone and cramping with K tape donned  Pt continues to present with significant hypsensitivity to touch throughout LUE with OTR recommendations to apply various textures for desensitization        Plan: Continue per plan of care        INTERVENTION COMMENTS:  Diagnosis: CVA  Precautions: N/A  FOTO: 48, with 68% limitation in UE function and 75% limitation in Hand function  Insurance: Payor: [place] [employer] [id number] / Plan: COMMERCIAL MISCELLANEOUS / Product Type: Fee for Service Commercial /   5 of 8 visits through 08/08/2019, PN due 08/11/2019    Daily note by Thee Cruz OTS under supervision of Costa Birmingham MS, OTR/L

## 2019-08-06 ENCOUNTER — OFFICE VISIT (OUTPATIENT)
Dept: PHYSICAL THERAPY | Facility: REHABILITATION | Age: 59
End: 2019-08-06
Payer: COMMERCIAL

## 2019-08-06 ENCOUNTER — OFFICE VISIT (OUTPATIENT)
Dept: OCCUPATIONAL THERAPY | Facility: REHABILITATION | Age: 59
End: 2019-08-06
Payer: COMMERCIAL

## 2019-08-06 DIAGNOSIS — I63.9 CEREBROVASCULAR ACCIDENT (CVA), UNSPECIFIED MECHANISM (HCC): Primary | ICD-10-CM

## 2019-08-06 PROCEDURE — 97110 THERAPEUTIC EXERCISES: CPT

## 2019-08-06 PROCEDURE — 97112 NEUROMUSCULAR REEDUCATION: CPT

## 2019-08-06 PROCEDURE — 97140 MANUAL THERAPY 1/> REGIONS: CPT

## 2019-08-06 NOTE — PROGRESS NOTES
Daily Note     Today's date: 2019  Patient name: Micheline Pool  : 1960  MRN: [id number]  Referring provider: Fina Julian,   Dx:   Encounter Diagnosis   Name Primary?  Cerebrovascular accident (CVA), unspecified mechanism ([address] ) Yes                  Subjective: "Always painful"  Objective: See treatment diary below  FES to forearm extensors and triceps while at same time engaging in UEB for 5 min prograde/5min retrograde focusing on tone reduction, sensory re-ed and proximal strengthening/endurance, no resistance added this session  Ice applied to bicep region and forearm for 5 min followed by MFR to tone reduction and sensory re-ed  K tape applied to bicep and forearm simutaneously engaging in therex using large beachball in retraction/protraction and scaption, 2x10 focusing on endurance  Assessment: Tolerated treatment well  Patient would benefit from continued OT  Pt reported no skin irritation with ktape donned to LUE  Pt reported shoulder fatigue and LUE felt relaxed       Plan: Continued skilled OT per POC     INTERVENTION COMMENTS:  Diagnosis: CVA  Precautions: N/A  FOTO: 48, with 68% limitation in UE function and 75% limitation in Hand function  Insurance: Payor: Elizabeth Newton / Plan: COMMERCIAL MISCELLANEOUS / Product Type: Fee for Service Commercial /   6 of 8 visits through 2019, PN scheduled 2019

## 2019-08-06 NOTE — PROGRESS NOTES
Daily Note     Today's date: 2019  Patient name: Magdalene Vargas  : 1960  MRN: [id number]  Referring provider: Ruby Ladd DO  Dx:   Encounter Diagnosis     ICD-10-CM    1  Cerebrovascular accident (CVA), unspecified mechanism ([facility] [address] ) I63 9        Start Time: 1152  Stop Time: 1230  Total time in clinic (min): 38 minutes    Subjective: Pt arrived 7 minutes late  Patient reports she feels good today, but continues to have cramping in left arm, which she reports is constant  She reports she walks regularly at home  Objective: See treatment diary below  Precautions: Monitor Blood Pressure    Rest Blood Pressure 19: 133/88mmHg, 70 bpm automatic cuff  Post-exercise Blood Pressure 19: 119/75 mmHg, 82 bpm automatic cuff              Exercise Diary     86   Endurance   Scifit; Level 1 10 min Scifit; Level 2 3 8min, 615 steps Ambulation x10 min, monitoring for foot scuffing    Scifit lv 2 3 8 min,     Lower Extremity Strengthening   Sit to stands: 2 sets x 10, 5x1  Heel Raises: 3 sets x 10  Step ups: 2 sets x 15, fwd and lateral  Standing Hip Abduction: 2 sets x 10 OTB  Standing Hip Extension: 2 sets x    mini squats  Sit to stands: 2 sets x 10, 5x1  Heel Raises: 3 sets x 10  Single leg heel raise x10 B  Step ups: 2 sets x 15, fwd and lateral  Standing Hip Abduction: 2 sets x 10 OTB  Standing Hip Extension: 2 sets x 10 OTB  Monster walks w/ OTB, holding rail 20ft x4 3 sets of 30 second STS:  7, 6, 7  Heel Raises: 2 sets x 10  Single leg heel raise 2x10 B  Step ups: 2 sets x 15, fwd and lateral, 1# cuff weights  Standing Hip Abduction: 2 sets x 10 Pink TB  Standing Hip Extension: 2 sets x 10 Pink TB  Monster walks w/ Pink TB, holding rail 20ft x4    Static Balance Foam  FTEO 1 min x2  FTEC 1 min x 2        Dynamic Balance   ambulation without assistive device    Difficulty with consistence left LE clearance and decreased stance phase [place] x80ft  Crossover stepping x80 ft, min use of rail  Backwards walking x80 ft  Tandem walking x80 ft  Tandem walking, arms crossed on shoulders x20 ft  Yellow rin stepovers w/ blue foam (hip strategy used but no LOB) 20ft x4               Assessment: Tolerated treatment well  Scuffing continues to occur w/ walking, noted R foot scuff about every 1-2 minutes  Educated provided for longer step length  Pt continues improve in strengthening, increasing resistance w/ exercises  Continued to monitor vitals at start and end of session, noted slight increase in diastolic value, however pt values near normal limits  Patient demonstrated fatigue post treatment and would benefit from continued PT      Plan: Continue per plan of care  Progress treatment as tolerated  Continue balance exercises on foam  Continue with ambulation practice, monitoring for foot scuffing

## 2019-08-08 ENCOUNTER — OFFICE VISIT (OUTPATIENT)
Dept: PHYSICAL THERAPY | Facility: REHABILITATION | Age: 59
End: 2019-08-08
Payer: COMMERCIAL

## 2019-08-08 ENCOUNTER — EVALUATION (OUTPATIENT)
Dept: OCCUPATIONAL THERAPY | Facility: REHABILITATION | Age: 59
End: 2019-08-08
Payer: COMMERCIAL

## 2019-08-08 DIAGNOSIS — I63.9 CEREBROVASCULAR ACCIDENT (CVA), UNSPECIFIED MECHANISM (HCC): Primary | ICD-10-CM

## 2019-08-08 PROCEDURE — 97112 NEUROMUSCULAR REEDUCATION: CPT | Performed by: OCCUPATIONAL THERAPIST

## 2019-08-08 PROCEDURE — 97112 NEUROMUSCULAR REEDUCATION: CPT | Performed by: PHYSICAL THERAPY ASSISTANT

## 2019-08-08 NOTE — PROGRESS NOTES
OCCUPATIONAL THERAPY RE-EVALUATION:    08/08/2019  Carl Webba  1960  [id number]  Elroy Blank,   No diagnosis found  Subjective Evaluation    Quality of life: fair        Pt's son present throughout full duration of re-evaluation to assist with translation as Pt is primarily [address] speaking  PATIENT GOAL: "Improve my sensation and use my LUE better"  HISTORY OF PRESENT ILLNESS:     Pt is a 60 y o  female who was referred to Occupational Therapy s/p CVA  As per hospital reports, Pt originally presented to the hospital on 6/29/2019 due to right thalamic lacunar CVA due to small vessel disease in which she is s/p TPA  Pt underwent various imaging with results identifying the following: MRI Brain W/o Contrast-- Several mm acute right thalamic lacunar infarction Mild chronic microangiopathy, CT Scan--No acute intracranial hemorrhage or mass effect,  CTA--No evidence of significant stenosis, dissection or occlusion involving cervical carotid or vertebral segments or cerebral arteries  Pt was evaluated by neurology and did have an echo  Pt did not participate in any therapy services in hospital setting  Pt was D/Carlos on 07/02/2019 to home environment  Pt with self report of paresthesias throughout LUE, decreased FMC/prehension with frequent droppage, inattention to LUE resulting in decreased functional use of LUE  Pt lives in an apartment on the seconds floor with daughter, granddaughter, and son in law--apartment has 10 steps for entry with railings on both sides for increased safety awareness  Pt performing ADLs at S status at this time for improved safety  Pts Dtr currently performing all IADLs at this time  Pt currently returning to [facility] laundry as tolerated  Pt with loss of worker rolePt worked as a  at [employer]  Pt with desires to return to worker role, if able  Pt has no history of  role         PMH:   Past Medical History:   Diagnosis Date    Gastritis          Pain Levels:     Restin    With Activity:  0    Objective     Functional Assessment        Comments  See impairment section for further details      Impairment Observations:  1  Decreased L FMC/prehension with frequent droppage  2  Decreased automaticity of LUE  3  Paresthesias throughout LUE, intolerant to various touch textures  4  LUE inattention  5  Decreased LUE strength-- proximal to distal    Dynamometer Position #2:   R: 58    44  L: 35    26    R hand dominant  Action:  3 point pinch: R  7 and L 5  R 7, L 6  2 point pinch: R 5 and L 4 5    R 5, L 5  Lateral pinch: R 10 5 and L 8    R 11, L 8    9-hole Peg Test: RUE: 20 62 seconds, LUE: 24 30 seconds    19 24 seconds, L 20 44 seconds    Eddie Hand function Test:  Pt performed functional tasks with non-dominant hand (L)  first and then performed with dominant hand (R) following  Writing: Non-dominant  35 99 seconds (outside normal range), Dominant 11 19 seconds (WNL)  Simulated Page Turning: Non-dominant 10 53 seconds (outside normal range), Dominant 4 40 seconds (WNL)  Lifting Small, Common Objects: Non-Dominant 9 20 seconds (outside normal range), Dominant 6 66 seconds (outside normal range)  Simulated Feeding: Non-Dominant 13 68 seconds (outside normal range), Dominant 5 63 seconds (WNL)  Stacking Checkers: Non-Dominant 5 03 seconds (outside normal range), Dominant 1 69 seconds (WNL)  Lifting Large Light Objects: Non-Dominant 5 16 seconds (outside normal range), Dominant 3 79 seconds (WNL)  Lifting Large, Heavy Objects:Non-Dominant 6 09 Seconds (outside normal range),  Dominant 3 69 seconds (WNL)    Eddie Hand function Test:  Pt performed functional tasks with non-dominant hand first and then performed with dominant hand following     Writing: Non-dominant 41 05 seconds (outside the range of normal), Dominant 14 40  seconds (outside the range of normal)  Simulated Page Turning: Non-dominant 6 72 seconds (outside the range of normal), Dominant 3 42 seconds (WNL)  Lifting Small, Common Objects: Non-Dominant 8 40 seconds (outside the range of normal), Dominant 5 27 seconds (WNL)  Simulated Feeding: Non-Dominant 11 32 seconds (outside the range of normal), Dominant 7 62 seconds (WNL)  Stacking Checkers: Non-Dominant 4 83 seconds (outside the range of normal), Dominant 2 93 seconds (WNL)  Lifting Large Light Objects: Non-Dominant 4 90  seconds (outside the range of normal), Dominant 3 10 seconds (WNL)  Lifting Large, Heavy Objects:Non-Dominant 6 12  seconds(outside the range of normal),  Dominant 3 22 seconds (WNL)    Myofilaments: B/L 3 61; Pt with good abilities to distinguish between hot and cold abilities  B/L AROM:  Shoulder elevation: B/L full   Shoulder FF: B/L full  Shoulder ABD: B/L full with LUE heaviness  ER/IR: B/L full  Elbow ext/flex: B/L full  Sup/Pron: B/L full   Wrist flex/ext: B/L full  Composite: B/L full  Hook: B/L full  Opposition: B/L intact   Finger to nose: B/L intact  Dysdiadochokinesia: B/L intact     MMT: R 5/5, L 3+/5    R 5/5, L 4/5 with hypersensitivities to touch throughout LUE      Plan  Plan details: See skilled analysis for further details  Skilled Analysis:  Pt is a 60 y o  female referred to Occupational Therapy s/p CVA  Pt presents with decreased L FMC/prehension with frequent droppage, decreased LUE automaticity, paresthesias throughout LUE with intolerance to various touch textures, decreased LUE strength-- proximal to distal, and LUE inattention affecting Pt's functional use of LUE in daily life tasks and meaningful occupations  Pt will benefit from skilled Occupational Therapy services 2x/week for 8-10 weeks with focus on neuro re-ed, sensory re-ed, self-care management, manual tx, and a HEP to improve on the above deficits, achieve maximal level of independence, improve functional use of LUE, eventual return to worker role, and increase overall QOL       Pt demo with G functional progression towards goals in POC  Pt demo with significant improved L FMC/prehension, automaticity, and in-hand manipulation  Pt continues to be most limited by decreased LUE muscle endurance and hypsensitivities throughout LUE affecting functional use of LUE  OTR recommending Pt to continue skilled OT services 2 x/week for 4-6 weeks with focus on descenticization, massed practice for improved muscle endurance, manual tx, FES, and neuro re-edu to improve functional use of LUE and overall QOL       Short Term Goals:  - Pt will tolerate NMES/FES for improved L motor and sensory performance for overall improved hand to target with 75% accuracy 4 weeks-- NOT MET  - Pt will increase prehension patterns for improved tripod with utensil management with <20% droppage 4 weeks-- MET  - Pt will demo with G carryover of Home Exercise Program to improve functional progression towards goals in Plan of care and for improved functional use of LUE x 85% 4 weeks-- PARTIALLY MET  - Pt will increase automaticity of L UE to 75% for improved grasp release of tabletop items for improved functional performance with salient tasks 4 weeks-- MET  - Pt will increase rate of manipulation for all FM tests x 75% for improved functional performance with salient tasks and meaningful occupations 4 weeks-- MET  - Pt will increase L UE to Mod [place] with <20% cuing for tabletop tasks for improved functional performance of life roles and salient tasks 4 weeks-- PARTIALLY MET  - Pt will demo with G understanding and carryover of desensitization strategies to be performed in home environment for improve tolerance to various touch sensations x 50% 4 weeks-- PARTIALLY MET    Long Term Goals:  - Pt will increase automaticity of  L UE to 95% for resumption of B integrative tasks-- PARTIALLY MET  - Pt will increase rate of prehension for all FM tasks x 95% for improved use of utensils, writing, ADL fasteners-- PARTIALLY MET  - Pt will resume  L hand non-dominance to independent with all activities of daily living, IADLs, and meaningful occupations-- NOT MET  - Pt will increase L UE strength to 5/5 and  strength L=R, through the use of strengthening exercises and home program for improved overall QOL-- PARTIALLY MET  - Pt will tolerate NMES/FES for improved motor and sensory performance for overall improved hand to target with 95% accuracy-- NOT MET        -    Pt will demo with G understanding and carryover of desensitization strategies to be performed in home environment for improve tolerance to various touch sensations x 85%--- NOT MET    TX NOTES:  - OTR donned K tape to L bicep--> tricep and wrist extensors with continue benefits reported  OTR provided Pt and Pt's son on location where able to purchase  OTHER PLANNED THERAPY INTERVENTIONS:   Supine, seated, and in stance neuro re-ed  NMES/FES  FMC/prehension  Timed Trials  Manual tx  Sensory re-ed  Desensitization strategies/techniques  Seated functional reach: crossing midline  WBearing strategies  B/L coordination        INTERVENTION COMMENTS:  Diagnosis: CVA  Precautions: N/A  FOTO: 53, with 68% limitation in UE function and 75% limitation in Hand function    60, with 50% limitation in UE function and 50% limitation in Hand function  Insurance: Payor: Elizabeth Newton / Plan: COMMERCIAL MISCELLANEOUS / Product Type: Fee for Service Commercial /   7 of 8 visits through 08/22/2019, PN due 09/08/2019

## 2019-08-08 NOTE — PROGRESS NOTES
Daily Note     Today's date: 2019  Patient name: Eliazar Oliver  : 1960  MRN: [id number]  Referring provider: Hardik Ness DO  Dx:   Encounter Diagnosis     ICD-10-CM    1  Cerebrovascular accident (CVA), unspecified mechanism ([facility] [address] ) I63 9                   Subjective: Pt reports she continues to have cramping in her LUE  Pt states it is usually worse at night  Objective: See treatment diary below  Precautions: Monitor Blood Pressure    Rest Blood Pressure 19: 133/88mmHg, 70 bpm automatic cuff  Post-exercise Blood Pressure 19: 119/75 mmHg, 82 bpm automatic cuff              Exercise Diary       Endurance   Scifit; Level 1 10 min Scifit; Level 2 3 8min, 615 steps Ambulation x10 min, monitoring for foot scuffing    Scifit lv 2 3 8 min,  sci fit L 2 3 8 min    Amb x 5 min    Lower Extremity Strengthening   Sit to stands: 2 sets x 10, 5x1  Heel Raises: 3 sets x 10  Step ups: 2 sets x 15, fwd and lateral  Standing Hip Abduction: 2 sets x 10 OTB  Standing Hip Extension: 2 sets x    mini squats  Sit to stands: 2 sets x 10, 5x1  Heel Raises: 3 sets x 10  Single leg heel raise x10 B  Step ups: 2 sets x 15, fwd and lateral  Standing Hip Abduction: 2 sets x 10 OTB  Standing Hip Extension: 2 sets x 10 OTB  Monster walks w/ OTB, holding rail 20ft x4 3 sets of 30 second STS:  7, 6, 7  Heel Raises: 2 sets x 10  Single leg heel raise 2x10 B  Step ups: 2 sets x 15, fwd and lateral, 1# cuff weights  Standing Hip Abduction: 2 sets x 10 Pink TB  Standing Hip Extension: 2 sets x 10 Pink TB  Monster walks w/ Pink TB, holding rail 20ft x4 HR x 20  SL HR x 20 each  Mini squats x 20  Standing Abd PTB x 20  Standing Ext PTB x 20  Monster walk PTB 20'x4  Step ups 2x15 fwd/lat 1# cuff weights      Static Balance Foam  FTEO 1 min x2  FTEC 1 min x 2         Dynamic Balance   ambulation without assistive device    Difficulty with consistence left LE clearance and decreased stance phase [employer] x80ft  Crossover stepping x80 ft, min use of rail  Backwards walking x80 ft  Tandem walking x80 ft  Tandem walking, arms crossed on shoulders x20 ft  Yellow rin stepovers w/ blue foam (hip strategy used but no LOB) 20ft x4  Marching 80'  Crossover stepping x80'  Backward walking x80'  Toe walking x80'  Sidestepping x80'               Assessment: Tolerated treatment well  Patient demonstrated fatigue post treatment and would benefit from continued PT      Plan: Continue per plan of care  Progress treatment as tolerated

## 2019-08-12 ENCOUNTER — TELEPHONE (OUTPATIENT)
Dept: NEUROLOGY | Facility: CLINIC | Age: 59
End: 2019-08-12

## 2019-08-12 NOTE — TELEPHONE ENCOUNTER
Patient called in asking for   Used number [phone]  Connected to [employer] #[id number]  Called patient  She wanted to verify appointment and address  Provided her the information  She does not have any additional questions or concerns at this time

## 2019-08-13 ENCOUNTER — OFFICE VISIT (OUTPATIENT)
Dept: PHYSICAL THERAPY | Facility: REHABILITATION | Age: 59
End: 2019-08-13
Payer: COMMERCIAL

## 2019-08-13 DIAGNOSIS — I63.9 CEREBROVASCULAR ACCIDENT (CVA), UNSPECIFIED MECHANISM (HCC): Primary | ICD-10-CM

## 2019-08-13 PROCEDURE — 97112 NEUROMUSCULAR REEDUCATION: CPT

## 2019-08-13 PROCEDURE — 97110 THERAPEUTIC EXERCISES: CPT

## 2019-08-13 NOTE — PROGRESS NOTES
Daily Note     Today's date: 2019  Patient name: Nichol Springer  : 1960  MRN: [id number]  Referring provider: Nova Fillers, DO  Dx:   Encounter Diagnosis     ICD-10-CM    1  [facility]bro[facility] accident ([facility]), [facility] I63 9        Start Time: 3736  Stop Time: 1410  Total time in clinic (min): 42 minutes    Subjective: Pt reports she feels that PT is helping her, she still notices cramping in her L arm  Objective: See treatment diary below  Precautions: Monitor Blood Pressure    Rest Blood Pressure 19: 131/88mmHg, 84 bpm automatic cuff  Post-exercise Blood Pressure 19: 119/81 mmHg, 78 bpm automatic cuff              Exercise Diary    Endurance Ambulation x10 min, monitoring for foot scuffing    Scifit lv 2 3 8 min,  sci fit L 2 3 8 min    Amb x 5 min sci fit L 3 0 11 min    Lower Extremity Strengthening 3 sets of 30 second STS:  7, 6, 7  Heel Raises: 2 sets x 10  Single leg heel raise 2x10 B  Step ups: 2 sets x 15, fwd and lateral, 1# cuff weights  Standing Hip Abduction: 2 sets x 10 Pink TB  Standing Hip Extension: 2 sets x 10 Pink TB  Monster walks w/ Pink TB, holding rail 20ft x4 HR x 20  SL HR x 20 each  Mini squats x 20  Standing Abd PTB x 20  Standing Ext PTB x 20  Monster walk PTB 20'x4  Step ups 2x15 fwd/lat 1# cuff weights       Static Balance       Dynamic Balance   Marching 80'  Crossover stepping x80'  Backward walking x80'  Toe walking x80'  Sidestepping x80' Ambulation x10 min, focus on heel strike and minimizing foot scuff    Fwd and backward walking w/ 180* turns x2 min  Lateral stepping w/ anterior and posterior crossover step x4 min  Fwd and backward tandem walking x4 min (no LOB)  Fwd/backward walking w/ ball toss x4 min  Lateral stepping w/ ball toss x2 min              Assessment: Tolerated treatment well  Pt is able to complete balance exercises today w/out any LOB   The only difficulty noted today was lateral stepping w/ backward crossover  With ambulation continued to note R foot scuff on occasion, continued cueing for improved foot clearance and heel strike to decrease scuffing  Patient demonstrated fatigue post treatment and would benefit from continued PT      Plan: Continue per plan of care  Progress treatment as tolerated  Increase difficulty of balance exercises by further incorporating dual tasking challenges

## 2019-08-15 ENCOUNTER — APPOINTMENT (OUTPATIENT)
Dept: PHYSICAL THERAPY | Facility: REHABILITATION | Age: 59
End: 2019-08-15
Payer: COMMERCIAL

## 2019-08-15 ENCOUNTER — APPOINTMENT (OUTPATIENT)
Dept: OCCUPATIONAL THERAPY | Facility: REHABILITATION | Age: 59
End: 2019-08-15
Payer: COMMERCIAL

## 2019-08-20 ENCOUNTER — OFFICE VISIT (OUTPATIENT)
Dept: OCCUPATIONAL THERAPY | Facility: REHABILITATION | Age: 59
End: 2019-08-20
Payer: COMMERCIAL

## 2019-08-20 ENCOUNTER — OFFICE VISIT (OUTPATIENT)
Dept: PHYSICAL THERAPY | Facility: REHABILITATION | Age: 59
End: 2019-08-20
Payer: COMMERCIAL

## 2019-08-20 DIAGNOSIS — I63.9 CEREBROVASCULAR ACCIDENT (CVA), UNSPECIFIED MECHANISM (HCC): Primary | ICD-10-CM

## 2019-08-20 PROCEDURE — 97110 THERAPEUTIC EXERCISES: CPT

## 2019-08-20 PROCEDURE — 97112 NEUROMUSCULAR REEDUCATION: CPT

## 2019-08-20 NOTE — PROGRESS NOTES
Daily Note     Today's date: 2019  Patient name: Lisa Small  : 1960  MRN: [id number]  Referring provider: Deadra Apgar, DO  Dx:   Encounter Diagnosis     ICD-10-CM    1  Cerebrovascular accident (CVA), unspecified mechanism ([facility] [address] ) I63 9                   Subjective: Pt reports she feels that PT is helping her, she still notices cramping in her L arm  Objective: See treatment diary below  Precautions: Monitor Blood Pressure    Rest Blood Pressure 19: 132/89mmHg, 70 bpm automatic cuff  Post-exercise Blood Pressure 19: 119/81 mmHg, 78 bpm automatic cuff              Exercise Diary     Endurance sci fit L 3 5 11 min     Lower Extremity Strengthening      Static Balance      Dynamic Balance  Ambulation outside: ascending/descending curb, ascending/descending inclines, and walking on uneven terrain x 20 minutes     Stepping over yellow hurdles: 20ft x 6 with 5 yellow hurdles    Stepping over yellow and orange hurdles: 20ft x 6 with 3 yellow hurdlers and 2 orange hurdles            Gait speed, balance, endurance    Assessment: Tolerated treatment well, tolerating the progression of dynamic balance exercises which were completed outside  She was able to complete balance exercises without any LOBs during outside ambulation but she became fatigue towards the end of walking outside  She also demonstrated good form with stepping over obstacles without any LOBs  Anticipate she will tolerate the progression of exercises next session  Patient demonstrated fatigue post treatment and would benefit from continued PT      Plan: Continue per plan of care  Progress treatment as tolerated  Increase difficulty of balance exercises by further incorporating dual tasking challenges

## 2019-08-20 NOTE — PROGRESS NOTES
Daily Note     Today's date: 2019  Patient name: Kassandra Kruger  : 1960  MRN: [id number]  Referring provider: Louretta Mortimer, DO  Dx:   Encounter Diagnosis   Name Primary?  Cerebrovascular accident (CVA), unspecified mechanism ([facility] ) Yes                  Subjective: "It feels better"      Objective: See treatment diary below  Pt engaged in skilled OT focusing on sense ory re-ed, tone reduction and UE strengthening  UEB for 5 min retrograde/prograde w/FES to forearm and triceps  MFR to biceps and forearm for tone reduction and desensitization efforts  Improvement noted in tolerance of sensory rollers tolerating all textures with moderate sensitivity to netting and rope  Pt engaged in therex using tband with difficulty in <90* FF and abduction seated, pt continued with therex supine for improved ROM and increase strength  Pt completed extension and ER/IR seated  Continue to review WHEP utilizing wrist weights next session with possible use of weighted cans for carryover at home  Ktape applied to forearm for tone reduction of forearm extensors  Assessment: Tolerated treatment well  Patient would benefit from continued OT      Plan: Continued skilled OT per POC with focus on WHEP and UE strengthening  INTERVENTION COMMENTS:  Diagnosis: CVA  Precautions: N/A  FOTO: 48, with 68% limitation in UE function and 75% limitation in Hand function    60, with 50% limitation in UE function and 50% limitation in Hand function  Insurance: Payor: Elizabeth 83 / Plan: COMMERCIAL MISCELLANEOUS / Product Type: Fee for Service Commercial /   8 of 8 visits through 2019, PN due 2019

## 2019-08-22 ENCOUNTER — OFFICE VISIT (OUTPATIENT)
Dept: PHYSICAL THERAPY | Facility: REHABILITATION | Age: 59
End: 2019-08-22
Payer: COMMERCIAL

## 2019-08-22 ENCOUNTER — OFFICE VISIT (OUTPATIENT)
Dept: OCCUPATIONAL THERAPY | Facility: REHABILITATION | Age: 59
End: 2019-08-22
Payer: COMMERCIAL

## 2019-08-22 DIAGNOSIS — I63.9 CEREBROVASCULAR ACCIDENT (CVA), UNSPECIFIED MECHANISM (HCC): Primary | ICD-10-CM

## 2019-08-22 PROCEDURE — 97112 NEUROMUSCULAR REEDUCATION: CPT | Performed by: OCCUPATIONAL THERAPIST

## 2019-08-22 PROCEDURE — 97112 NEUROMUSCULAR REEDUCATION: CPT

## 2019-08-22 PROCEDURE — 97140 MANUAL THERAPY 1/> REGIONS: CPT | Performed by: OCCUPATIONAL THERAPIST

## 2019-08-22 NOTE — PROGRESS NOTES
Daily Note     Today's date: 2019  Patient name: Edouard Zhang  : 1960  MRN: [id number]  Referring provider: Belle Molina DO  Dx: No diagnosis found  Subjective: "Not bad"  Objective: See treatment description below    UBE x 5 minutes prograde and retrograde bilaterally in seated position at x2 resistance with focus on proximal strength, B/L coordination, and L gross grasp  Sensory rollers to LUE, followed by gentle IASTM for tone reduction and sensory re-ed  K-tape applied to wrist flexors to medial elbow 2* Max griddy feel and hypsensitivity  In stance functional reach crossing midline for various resistive clamps and placement on board placed at [facility] level with focus on muscle endurance, intrinsic strength, and FMC/prehension  Stereognosis with demands of identifying various objects by touch with vision occluded  Assessment: Tolerated treatment well  Patient would benefit from continued OT     Pt demo with improved tolerance to sensory rollers on this date with hypersensitivity only reported with cotton texture  Pt able to tolerate gentle IASTM with Max griddy feel and hypsensitivity to wrist flexors into medial elbow region  Pt continues to respond positively to K-tape application  Pt with G muscle endurance for OH reach and FMC/prehension with task completed at quick pace with 0% droppage  Pt able to identify  with requires to reduce amount of items to choose from during last 6 items  Plan: Continue per plan of care        INTERVENTION COMMENTS:  Diagnosis: CVA  Precautions: N/A  FOTO: 48, with 68% limitation in UE function and 75% limitation in Hand function  Insurance: Payor: Elizabeth Newton / Plan: COMMERCIAL MISCELLANEOUS / Product Type: Fee for Service Commercial /   2 of 8 visits through 2019, PN due 2019

## 2019-08-22 NOTE — PROGRESS NOTES
Daily Note     Today's date: 2019  Patient name: Sophie Clayton  : 1960  MRN: [id number]  Referring provider: Marco Driver DO  Dx:   Encounter Diagnosis     ICD-10-CM    1  Cerebrovascular accident (CVA), unspecified mechanism ([facility] [address] ) I63 9        Start Time: 1430  Stop Time: 1510  Total time in clinic (min): 40 minutes    Subjective: Pt reports she feels that PT is helping her, she still notices cramping in her L arm  Objective: See treatment diary below  Precautions: Monitor Blood Pressure    Rest Blood Pressure 19: mmHg, bpm automatic cuff  Post-exercise Blood Pressure 19: 119/81 mmHg, 78 bpm automatic cuff              Exercise Diary    Endurance sci fit L 3 5 11 min     Lower Extremity Strengthening      Static Balance      Dynamic Balance  Ambulation outside: ascending/descending curb, ascending/descending inclines, and walking on uneven terrain x 20 minutes     Stepping over yellow hurdles: 20ft x 6 with 5 yellow hurdles    Stepping over yellow and orange hurdles: 20ft x 6 with 3 yellow hurdlers and 2 orange hurdles Ambulation outside: ascending/descending curb, ascending/descending inclines, and walking on uneven terrain x 15 minutes    Step ups onto 8" curb w/ 2 5# cuff weights to promote LE clearance    Low, high, and wide obstacle course w/ 2 5# cuff weights  x5 min    Static stepovers x10 B    Karaokes x30 sec tl L and R               Assessment: Tolerated treatment well, pt is able to complete ambulation outdoors on uneven surfaces for extended period, but did note occasional scuffing w/ BLEs  Continued to address this deficit today w/ activities which require pt to achieve higher step  Pt frequently lands w/ forefoot first on obstacle course, but w/ VCs she is able to achieve improved consistency w/ heel strike  Patient demonstrated fatigue post treatment and would benefit from continued PT      Plan: Continue per plan of care  Progress treatment as tolerated  Continue to monitor heel strike w/ walking

## 2019-08-23 ENCOUNTER — OFFICE VISIT (OUTPATIENT)
Dept: NEUROLOGY | Facility: CLINIC | Age: 59
End: 2019-08-23
Payer: COMMERCIAL

## 2019-08-23 VITALS
WEIGHT: 192 LBS | DIASTOLIC BLOOD PRESSURE: 60 MMHG | RESPIRATION RATE: 16 BRPM | HEART RATE: 70 BPM | HEIGHT: 65 IN | SYSTOLIC BLOOD PRESSURE: 120 MMHG | BODY MASS INDEX: 31.99 KG/M2

## 2019-08-23 DIAGNOSIS — Z86.73 HISTORY OF CVA (CEREBROVASCULAR ACCIDENT): Primary | ICD-10-CM

## 2019-08-23 DIAGNOSIS — I63.9 CVA (CEREBRAL VASCULAR ACCIDENT) (HCC): ICD-10-CM

## 2019-08-23 DIAGNOSIS — F17.201 TOBACCO ABUSE, IN REMISSION: ICD-10-CM

## 2019-08-23 DIAGNOSIS — I63.9 THALAMIC STROKE (HCC): ICD-10-CM

## 2019-08-23 PROBLEM — I63.81 THALAMIC STROKE (HCC): Status: ACTIVE | Noted: 2019-08-23

## 2019-08-23 PROCEDURE — 99215 OFFICE O/P EST HI 40 MIN: CPT | Performed by: PSYCHIATRY & NEUROLOGY

## 2019-08-23 RX ORDER — IBUPROFEN 200 MG
400 TABLET ORAL EVERY 6 HOURS PRN
COMMUNITY
End: 2021-07-06 | Stop reason: ALTCHOICE

## 2019-08-23 RX ORDER — ATORVASTATIN CALCIUM 80 MG/1
40 TABLET, FILM COATED ORAL
Qty: 30 TABLET | Refills: 3 | Status: SHIPPED | OUTPATIENT
Start: 2019-08-23 | End: 2020-03-30 | Stop reason: SDUPTHER

## 2019-08-23 NOTE — PROGRESS NOTES
Patient ID: Katie Jorge is a 60 y o  female  Assessment/Plan:    61 y/o F who is here as a hospital follow up for R thalamic stroke  Etiology is likely small vessel disease  She did have a history of smoking, hyperilipidemia  She quit smoking since the stroke  Vascular RF - hypertension, and hyperlipidemia  PLAN:  -c/w with combination of aspirin and atorvastatin but will decrease the dose to 40mg PO qHS for secondary stroke prevention  -Continue with BP goal < 130/80, BP is at goal today  -Defer to PCP regarding BP management  -does not smoke anymore, she just quit 2 months ago    -denies any history of snoring    -Continue with PT/OT twice a week  -I advised patient to avoid using NSAIDs for headaches or other pain and instead just stick to tylenol    -I educated regarding mediterranean style diet and regular exercise regimen of brisk walking atleast 4-5 times a week  Literature given  -I educated patient and family regarding medication compliance and stroke risk factor modifications    -I advised her not to return to work yet because she is still getting therapy  I gave them some literature today about returning to work    -she is currently not driving either  I would like to follow up in 6 months  I would be happy to see the patient sooner if any new questions/concerns arise  Patient/Guardian was advised to the call the office if they have any questions and concerns in the meantime  Patient/Guardian does understand that if they have any new stroke like symptoms such as facial droop on one side, weakness/paralysis on either side, speech trouble, numbness on one side, balance issues, any vision changes, extreme dizziness or any new headache, to call 9-1-1 immediately or to proceed to the nearest ER immediately          Problem List Items Addressed This Visit        Cardiovascular and Mediastinum    CVA (cerebral vascular accident) ([facility] [address] )    Relevant Medications    atorvastatin (LIPITOR) 80 mg tablet       Nervous and Auditory    Thalamic stroke (HCC)       Other    History of CVA (cerebrovascular accident) - Primary    Tobacco abuse, in remission             Subjective:    HPI    This is a 61 y/o Female who is here as a hospital follow up  I reviewed her hospital records from 6/29/19 when she presented as a stroke alert with numbness and heaviness on Left side of her body  She did not have any speech difficulties  She had CT head which I personally reviewed the images of which was negative for any acute findings  She had CTA head and neck which was non-occlusive  She was given TPA  Patient was admitted for stroke workup, post TPA protocol  She had CORNELIO brain which I personally reviewed the images of which showed small punctate R thalamic stroke  Etiology was thought to be small vessel in etiology  Patient was not on aspirin prior to this so was started on aspirin for stroke prevention  She is here today for a follow up and is doing well since the discharge  She is still having left upper extremity and LLE numbness but it is improving  She is getting PT and OT twice a week  She does notice an improvement with her symptoms with therapy  She does not smoke anymore, she quit since the stroke  She is tolerating aspirin well without any issues  She is taking lipitor 80mg as well  Patient wants to know if she can return to work soon  She is also not driving at this time  The following portions of the patient's history were reviewed and updated as appropriate:   She  has a past medical history of Gastritis  She   Patient Active Problem List    Diagnosis Date Noted    History of CVA (cerebrovascular accident) 08/23/2019    Tobacco abuse, in remission 08/23/2019    Thalamic stroke ([facility] ) 08/23/2019    [facility]     She  has no past surgical history on file  Her family history is not on file  She  reports that she has quit smoking   She smoked 0 25 packs per day  She has never used smokeless tobacco  Her alcohol and drug histories are not on file  Current Outpatient Medications   Medication Sig Dispense Refill    aspirin 81 mg chewable tablet Chew 1 tablet (81 mg total) daily  0    atorvastatin (LIPITOR) 80 mg tablet Take 0 5 tablets (40 mg total) by mouth daily with dinner 30 tablet 3    ibuprofen (MOTRIN) 200 mg tablet Take 400 mg by mouth every 6 (six) hours as needed for mild pain      pantoprazole (PROTONIX) 40 mg tablet Take 1 tablet (40 mg total) by mouth daily in the early morning 30 tablet 1    nicotine (NICODERM CQ) 7 mg/24hr TD 24 hr patch Place 1 patch on the skin daily (Patient not taking: Reported on 8/23/2019) 28 patch 0     No current facility-administered medications for this visit  Current Outpatient Medications on File Prior to Visit   Medication Sig    aspirin 81 mg chewable tablet Chew 1 tablet (81 mg total) daily    ibuprofen (MOTRIN) 200 mg tablet Take 400 mg by mouth every 6 (six) hours as needed for mild pain    pantoprazole (PROTONIX) 40 mg tablet Take 1 tablet (40 mg total) by mouth daily in the early morning    [DISCONTINUED] atorvastatin (LIPITOR) 80 mg tablet Take 1 tablet (80 mg total) by mouth daily with dinner    nicotine (NICODERM CQ) 7 mg/24hr TD 24 hr patch Place 1 patch on the skin daily (Patient not taking: Reported on 8/23/2019)     No current facility-administered medications on file prior to visit  She has No Known Allergies            Objective:    Blood pressure 120/60, pulse 70, resp  rate 16, height 5' 5" (1 651 m), weight 87 1 kg (192 lb)  Physical Exam  General - Patient is alert, awake, follows commands  Speech - no dysarthria noted, no aphasia noted  Neuro:   Cranial nerves: PERRL, EOMI, mild L facial droop noted, facial sensation intact, tongue midline  Motor: 5/5 on the R, 4-/5 LUE and LLE  Sensory - decreased to soft touch on L side of her arm, and leg     Coordination - no ataxia/dysmetria noted  Gait - mild hemiparetic gait noted  ROS:  I personally reviewed ROS  Review of Systems   Constitutional: Negative  Negative for appetite change and fever  HENT: Negative  Negative for hearing loss, tinnitus, trouble swallowing and voice change  Eyes: Negative  Negative for photophobia and pain  Respiratory: Negative  Negative for shortness of breath  Cardiovascular: Negative  Negative for palpitations  Gastrointestinal: Negative  Negative for nausea and vomiting  Endocrine: Negative  Negative for cold intolerance and heat intolerance  Genitourinary: Negative  Negative for dysuria, frequency and urgency  Musculoskeletal: Negative  Negative for myalgias and neck pain  Skin: Negative  Negative for rash  Neurological: Positive for weakness and numbness  Negative for dizziness, tremors, seizures, syncope, facial asymmetry, speech difficulty, light-headedness and headaches  Patient states that she has left arm numbness and tingling  Patient also stated that she has left leg weakness  Patient stated that she has OT / PT 2 times a week  Hematological: Negative  Does not bruise/bleed easily  Psychiatric/Behavioral: Negative  Negative for confusion, hallucinations and sleep disturbance  Total combined time spent 40 minutes today  Greater than 50% of total time was spent with the patient and or family counseling and or coordination of care  A brief description of coordination of care: stroke education risk, medication compliance, reviewing EMR, smoking cessation counseling, return to work, and driving issues

## 2019-08-27 ENCOUNTER — OFFICE VISIT (OUTPATIENT)
Dept: OCCUPATIONAL THERAPY | Facility: REHABILITATION | Age: 59
End: 2019-08-27
Payer: COMMERCIAL

## 2019-08-27 ENCOUNTER — OFFICE VISIT (OUTPATIENT)
Dept: PHYSICAL THERAPY | Facility: REHABILITATION | Age: 59
End: 2019-08-27
Payer: COMMERCIAL

## 2019-08-27 DIAGNOSIS — I63.9 CEREBROVASCULAR ACCIDENT (CVA), UNSPECIFIED MECHANISM (HCC): Primary | ICD-10-CM

## 2019-08-27 PROCEDURE — 97112 NEUROMUSCULAR REEDUCATION: CPT

## 2019-08-27 PROCEDURE — 97140 MANUAL THERAPY 1/> REGIONS: CPT

## 2019-08-27 PROCEDURE — 97110 THERAPEUTIC EXERCISES: CPT

## 2019-08-27 NOTE — PROGRESS NOTES
Daily Note     Today's date: 2019  Patient name: Nael Kline  : 1960  MRN: [id number]  Referring provider: Brandon Crawley DO  Dx:   Encounter Diagnosis     ICD-10-CM    1  Cerebrovascular accident (CVA), unspecified mechanism ([facility] [address] ) I63 9        Start Time: 1405  Stop Time: 5121  Total time in clinic (min): 40 minutes    Subjective: Pt reports she feels that PT is helping her, she still notices cramping in her L arm  Objective: See treatment diary below  Precautions: Monitor Blood Pressure    Rest Blood Pressure 19: 135/83 mmHg, 65 bpm automatic cuff  Post-exercise Blood Pressure 19: 119/81 mmHg, 78 bpm automatic cuff              Exercise Diary    Endurance sci fit L 3 5 11 min      Lower Extremity Strengthening       Static Balance       Dynamic Balance  Ambulation outside: ascending/descending curb, ascending/descending inclines, and walking on uneven terrain x 20 minutes     Stepping over yellow hurdles: 20ft x 6 with 5 yellow hurdles    Stepping over yellow and orange hurdles: 20ft x 6 with 3 yellow hurdlers and 2 orange hurdles Ambulation outside: ascending/descending curb, ascending/descending inclines, and walking on uneven terrain x 15 minutes    Step ups onto 8" curb w/ 2 5# cuff weights to promote LE clearance    Low, high, and wide obstacle course w/ 2 5# cuff weights   x5 min    Static stepovers x10 B    Karaokes x30 sec tl L and R Ambulation outside: ascending/descending curb, ascending/descending inclines, speed changes, and walking on uneven terrain x 10 minutes    Low, high, and wide stepping obstacle course, progressing to balancing ball on cone, then ball toss w/ clinician, x112 min total    Heel to toe walk on balance beam, progressing to ball toss w/ this x5 min    Foam STS x15 EO, x10 EC    Foam toe taps to targets x4 min    Asc/descend stairs 4x4                    Assessment: Tolerated treatment well, decreased scuffing noted as compared to previous visit  She continues to strike w/ forefoot first when completing obstacle course  Increased balance challenges w/ dual tasking, pt maintains balance throughout, using hip and stepping strategy as needed  This is consistent throughout and demonstrates good balance control w/ each challenge  Patient exhibited good technique with therapeutic exercises and would benefit from continued PT      Plan: Continue per plan of care  Progress treatment as tolerated  Continue dual task challenges and on compliant surfaces

## 2019-08-27 NOTE — PROGRESS NOTES
Daily Note     Today's date: 2019  Patient name: Lisa Small  : 1960  MRN: [id number]  Referring provider: Deadra Apgar, DO  Dx:   Encounter Diagnosis   Name Primary?  [facility]                  Subjective: "My arm feels much better"  Objective: See treatment diary below  UEB for 5 min prograde bilaterally, 5 min retrograde unilaterally for about 3 min completing task bilaterally for remainder of 2 min due to fatigue  Activity focused on LUE strengthening and endurance  NMES w/ice to forearm flexors and bicep region focusing on tone reduction and sensory re-ed due to hypersensitivity  K tape: I band applied to bicep, forearm flexors and extensors, facilitating decompression and tone reduction  Utilizing 1# DB pt engaged in FF, chest press, OH tricep dip w/exetnsion, scaption, 1x10  Assessment: Tolerated treatment well  Patient would benefit from continued OT  Pt reported fatigue post session  Improvement noted in tolerance to IASTM and STM this session, however, pt continues with notable tone in forearm       Plan: Continued skilled OT per POC     INTERVENTION COMMENTS:  Diagnosis: CVA  Precautions: N/A  FOTO: 48, with 68% limitation in UE function and 75% limitation in Hand function  Insurance: Payor: Elizabeth Newton / Plan: COMMERCIAL MISCELLANEOUS / Product Type: Fee for Service Commercial /   3 of 8 visits through 2019, PN due 2019

## 2019-08-29 ENCOUNTER — APPOINTMENT (OUTPATIENT)
Dept: OCCUPATIONAL THERAPY | Facility: REHABILITATION | Age: 59
End: 2019-08-29
Payer: COMMERCIAL

## 2019-08-30 ENCOUNTER — OFFICE VISIT (OUTPATIENT)
Dept: OCCUPATIONAL THERAPY | Facility: REHABILITATION | Age: 59
End: 2019-08-30
Payer: COMMERCIAL

## 2019-08-30 DIAGNOSIS — I63.9 CEREBROVASCULAR ACCIDENT (CVA), UNSPECIFIED MECHANISM (HCC): Primary | ICD-10-CM

## 2019-08-30 PROCEDURE — 97112 NEUROMUSCULAR REEDUCATION: CPT

## 2019-08-30 PROCEDURE — 97140 MANUAL THERAPY 1/> REGIONS: CPT

## 2019-08-30 PROCEDURE — 97110 THERAPEUTIC EXERCISES: CPT

## 2019-08-30 NOTE — PROGRESS NOTES
Daily Note     Today's date: 2019  Patient name: Carmelo Flores  : 1960  MRN: [id number]  Referring provider: Nataliia Villarreal DO  Dx:   Encounter Diagnosis   Name Primary?  Cerebrovascular accident (CVA), unspecified mechanism ([facility] ) Yes                  Subjective: "A little bit of pain "      Objective: See treatment below  Initiated session with 5 minutes prograde and 5 minutes retrograde on UEB for increasing proximal strength and endurance  Sensory rollers and gentle IASTM for sensory re-ed and increasing tolerance to various textures and proprioceptive input  In stance, completed 1# free weight (1x10 reps) in shoulder flexion, abduction, triceps, and bicep curls  In seated, (1x10) wrist extension/flexion, ulnar/radial deviation, and supination  IR/ER with 1# wrist cuff to LUE while passing BB  Pixy cubes for in-hand manipulation starting with 2 cubes and upgrading to 3 cubes in hand  Assessment: Tolerated treatment well  Removed Ktape from forearm extensors and noted with slight redness of skin and educated to not replace taping until tomorrow for continued skin integrity  2/10 pain in shoulder during 1# free weight exercises  Fatigue in shoulder with BB task  Good in-hand manipulation with pixy cubes and no noted droppage  Plan: Continued skilled OT per POC      INTERVENTION COMMENTS:  Diagnosis: Cerebrovascular accident (CVA), unspecified mechanism ([facility] ) [I63 9]  Precautions: N/A  4 of 8 visits, PN due 2019, PN due 2019

## 2019-09-03 ENCOUNTER — OFFICE VISIT (OUTPATIENT)
Dept: OCCUPATIONAL THERAPY | Facility: REHABILITATION | Age: 59
End: 2019-09-03
Payer: COMMERCIAL

## 2019-09-03 DIAGNOSIS — I63.9 CEREBROVASCULAR ACCIDENT (CVA), UNSPECIFIED MECHANISM (HCC): Primary | ICD-10-CM

## 2019-09-03 PROCEDURE — 97110 THERAPEUTIC EXERCISES: CPT

## 2019-09-03 PROCEDURE — 97150 GROUP THERAPEUTIC PROCEDURES: CPT

## 2019-09-03 PROCEDURE — 97140 MANUAL THERAPY 1/> REGIONS: CPT

## 2019-09-03 NOTE — PROGRESS NOTES
Daily Note     Today's date: 9/3/2019  Patient name: Alexsandra Killian  : 1960  MRN: [id number]  Referring provider: Beverly Mar DO  Dx:   Encounter Diagnosis     ICD-10-CM    1  Cerebrovascular accident (CVA), unspecified mechanism ([facility] [address] ) I63 9                   Subjective: "Pain in forearm area"      Objective: See treatment diary below  Pt engaged in Skilled OT focusing on UE strength/ endurance WHEP, IASTM, neuro re-ed, sensory re-ed  Pt 5 min pro/retrograde on UEB  Pt educated in [facility] focusing on B/L UE strength w/ 1 lb weight  IASTM for 8 mins to forearm for sensory input  Tested sensory rollers on Pt w/ no noted sensitivity  Pt engaged in various velcro rollers w/ various grasp patterns for sensory input and strengthening  Assessment: Tolerated treatment well  Patient would benefit from continued OT  Pt w/ good understanding WHEP as evidence by participation in exercises  Pt demo- decreased hyper-sensitivity to IASTM and sensory rollers w/ significant reduction in tone noted  Plan: Per plan of care focusing on neuro re-ed  INTERVENTION COMMENTS:  Diagnosis: CVA  Precautions: N/A  FOTO: 48, with 68% limitation in UE function and 75% limitation in Hand function  Insurance: Payor: Elizabeth  / Plan: COMMERCIAL MISCELLANEOUS / Product Type: Fee for Service Commercial /   5 of 8 visits through 2019, PN due 2019         Pt was treated by JOSELO Sullivan, under direct supervision of VIKTORIA Cordova  9/3  1:00-1:15: 1:1  1:15-1:30: GP  1:30-2:00: 1:1

## 2019-09-05 ENCOUNTER — APPOINTMENT (OUTPATIENT)
Dept: PHYSICAL THERAPY | Facility: REHABILITATION | Age: 59
End: 2019-09-05
Payer: COMMERCIAL

## 2019-09-05 ENCOUNTER — APPOINTMENT (OUTPATIENT)
Dept: OCCUPATIONAL THERAPY | Facility: REHABILITATION | Age: 59
End: 2019-09-05
Payer: COMMERCIAL

## 2019-09-05 NOTE — PROGRESS NOTES
PT Re-Evaluation     Today's date: 2019  Patient name: Mike Bradford  : 1960  MRN: [id number]  Referring provider: Rut Hernández DO  Dx: No diagnosis found  Subjective: Pt reports she feels that PT is helping her, she still notices cramping in her L arm  Objective: See treatment diary below  Precautions: Monitor Blood Pressure    Manual Muscle Testing - Hip Right Left   Flexion 5 4   Abduction 5 4   Adduction 5 4     Manual Muscle Testing - Knee Right Left   Flexion 5 4   Extension 5 4     Manual Muscle Testing - Ankle Right Left   Doriflexion 5 4   Plantarflexion 5 4     Gait: Patient ambulates with a step through pattern but demonstrates slight lateral trunk displacement and slight trendelenburg gait patten on the left side  Outcome Measures    6 Minute Walk Test (ft): 1,060 feet, numbness in left arm and 5/10 lightheadedness     Post /88mmHg    10MWT (s):  15 48 seconds with assistive device     14 93 seconds without assistive device    5x Sit To Stand (s):  21 94 seconds, 17" chair with arms across chest    TUG (s) 22 54 seconds with rolling walker    14 43 seconds without assistive device       Exercise Diary    Endurance sci fit L 3 5 11 min      Lower Extremity Strengthening       Static Balance       Dynamic Balance  Ambulation outside: ascending/descending curb, ascending/descending inclines, and walking on uneven terrain x 20 minutes     Stepping over yellow hurdles: 20ft x 6 with 5 yellow hurdles    Stepping over yellow and orange hurdles: 20ft x 6 with 3 yellow hurdlers and 2 orange hurdles Ambulation outside: ascending/descending curb, ascending/descending inclines, and walking on uneven terrain x 15 minutes    Step ups onto 8" curb w/ 2 5# cuff weights to promote LE clearance    Low, high, and wide obstacle course w/ 2 5# cuff weights   x5 min    Static stepovers x10 B    Karaokes x30 sec tl L and R Ambulation outside: ascending/descending curb, ascending/descending inclines, speed changes, and walking on uneven terrain x 10 minutes    Low, high, and wide stepping obstacle course, progressing to balancing ball on cone, then ball toss w/ clinician, x112 min total    Heel to toe walk on balance beam, progressing to ball toss w/ this x5 min    Foam STS x15 EO, x10 EC    Foam toe taps to targets x4 min    Asc/descend stairs 4x4                    ASSESSMENT  Patient presents to outpatient physical therapy with sx consistent with CVA  Patient's impairments include pain, decrease coordination, decrease functional mobility, decrease functional endurance, decrease functional strength, decrease dynamic/static balance, and impaired gait mechanics  Mobility measures and gait mechanics reported in objective setting; indicate the patient is at any increase risk of experiencing a fall  These impairments are currently limiting the patient's ability to participate in work, community activities, recreational activities, and independent ADLs  Patient will benefit from skilled outpatient physical therapy to address the above impairments in order to improve patient's quality of life and to participate in community activities  Patient has a good prognosis with outpatient physical therapy  After the 6 minute walk test patient reported increase numbness in her left arm and lightheadedness  She denied any headaches, nausea, or dizziness after the test  I advised her daughter about the signs of a stroke and the importance of presenting to the emergency department, patient's daughter stated understanding and anticipate good follow through     STG's:   - Patient will be independent with home exercise program in 2 weeks  - Patient will complete the TUG in no more than 10 seconds to demonstrate improved functional mobility   -Patient will complete the 5x sit to  no more than 15 seconds to demonstrate improved functional mobility and functional strength     - Patient will demonstrate an improvement in gait speed by  1m/sec to demonstrate improved functional mobility  LTG's:  -Patient will complete the 5x sit to  no more than 11 seconds to demonstrate improved functional mobility and functional strength  - Patient will demonstrate an improvement in gait speed by  2m/sec to demonstrate improved functional mobility   - Patient will complete 6 minute walk test demonstrating no more than slight gait deviations to demonstrate improved functional mobility and improved gait mechanics    PLAN OF CARE  Patient will benefit from skilled outpatient physical therapy 1-2 x/wk for 8 weeks  Therapeutic interventions to include therapeutic activity, therapeutic exercise, neuromuscular re-education  as listed below in the grids

## 2019-09-10 ENCOUNTER — EVALUATION (OUTPATIENT)
Dept: PHYSICAL THERAPY | Facility: REHABILITATION | Age: 59
End: 2019-09-10
Payer: COMMERCIAL

## 2019-09-10 ENCOUNTER — OFFICE VISIT (OUTPATIENT)
Dept: OCCUPATIONAL THERAPY | Facility: REHABILITATION | Age: 59
End: 2019-09-10
Payer: COMMERCIAL

## 2019-09-10 DIAGNOSIS — I63.9 CEREBROVASCULAR ACCIDENT (CVA), UNSPECIFIED MECHANISM (HCC): Primary | ICD-10-CM

## 2019-09-10 PROCEDURE — 97530 THERAPEUTIC ACTIVITIES: CPT

## 2019-09-10 PROCEDURE — 97110 THERAPEUTIC EXERCISES: CPT

## 2019-09-10 PROCEDURE — 97112 NEUROMUSCULAR REEDUCATION: CPT

## 2019-09-10 NOTE — PROGRESS NOTES
Daily Note     Today's date: 9/10/2019  Patient name: Nichol Springer  : 1960  MRN: [id number]  Referring provider: Nova Fillers, DO  Dx:   Encounter Diagnosis     ICD-10-CM    1  Cerebrovascular accident (CVA), unspecified mechanism [facility] I63 9                   Subjective: " My forearm hurts more than normal "       Objective: See treatment description below     Pt engaged in skilled OT focusing on muscular strength/endurance, sensory re-ed, proprioceptive input w/ hand to target demands, tone reduction  Pt engaged 5 min prograde/retrograde unilateral LUE  IASTM to L forearm followed by ice w/ massager for tone reduction  Pt engaged in bean bag toss utilizing LUE w/ hand to target demands focusing on functional forward reach w/ increase ROM and tolerance to forward reach  Pt completed mosaic magnetic tiles on slant board w/ LUE crossing midline to retrieve pieces  KT applied to forearm for sensory re-ed and tone reduction efforts  Assessment: Tolerated treatment well  Patient would benefit from continued OT   Pt reported hyper-sensitivity to LUE at start of OT session w/ increase in sensitivity to IASTM,  ice w/ massager provided relief per pt report  Bean bag activity upgraded to throw over mirror board for increase ROM of LUE  Pt demo increased ability for in hand manipulation and palm to digit translation of cubes w/ less than 25% droppage during mosaic tile activity  Plan: Continue per plan of care  INTERVENTION COMMENTS:  Diagnosis: CVA  Precautions: N/A  FOTO: 48, with 68% limitation in UE function and 75% limitation in Hand function  Insurance: Payor: Elizabeth  / Plan: COMMERCIAL MISCELLANEOUS / Product Type: Fee for Service Commercial /   6 of 8 visits through 2019, PN scheduled

## 2019-09-10 NOTE — PROGRESS NOTES
PT Re-Evaluation     Today's date: 9/10/2019  Patient name: Magdalene Vargas  : 1960  MRN: [id number]  Referring provider: Ruby Ladd DO  Dx:   Encounter Diagnosis     ICD-10-CM    1  Cerebrovascular accident (CVA), unspecified mechanism ([facility] ) I63 9              Subjective: Pt reports she feels a lot better since starting outpatient physical therapy noticing improved balance, endurance, and strength       Objective: See treatment diary below  Precautions: Monitor Blood Pressure  Rest Blood Pressure 09/10/19: 132/84 mmHg, 65 bpm automatic cuff  Post-exercise Blood Pressure 09/10/19: 138/86 mmHg, 82 bpm automatic cuff      Manual Muscle Testing - Hip Right Left   Flexion 5 5   Abduction 5 5   Adduction 5 5     Manual Muscle Testing - Knee Right Left   Flexion 5 5   Extension 5 5     Manual Muscle Testing - Ankle Right Left   Doriflexion 5 5   Plantarflexion 5 5     Gait: Patient ambulates with a step through pattern with bilateral reciprocal arm swing    Outcome Measures 7/11 9/10   6 Minute Walk Test (ft): 1,060 feet, numbness in left arm and 5/10 lightheadedness     Post /88mmHg 1,262 feet    Post BP: 138/86 mmHg, 82 bpm   10MWT (s):  15 48 seconds with assistive device     14 93 seconds without assistive device 10 53 seconds without assistive device,  95 meter/second   5x Sit To Stand (s):  21 94 seconds, 17" chair with arms across chest 16 21 seconds, 17" chair with arms across chest   TUG (s) 22 54 seconds with rolling walker    14 43 seconds without assistive device  10 67 seconds without assistive device   FGA       Functional Gait Assessment  3/3 Gait level surface  3/3 Change in gait speed  2/3 Gait with horizontal head turns  2/3 Gait with vertical head turns  2/3 Gait and pivot turn  2/3 Step over obstacle  2/3 Gait with narrow base of support  2/3 Gait with eyes closed  2/3 Ambulating backwards  3/3 Steps  23/30 Total score (less than 22/30 indicates increased risk of fall)     Exercise Diary 8/27 9/10   Endurance  6 minute walk test    Lower Extremity Strengthening  5x sit to stand    Static Balance      Dynamic Balance  Ambulation outside: ascending/descending curb, ascending/descending inclines, speed changes, and walking on uneven terrain x 10 minutes    Low, high, and wide stepping obstacle course, progressing to balancing ball on cone, then ball toss w/ clinician, x112 min total    Heel to toe walk on balance beam, progressing to ball toss w/ this x5 min    Foam STS x15 EO, x10 EC    Foam toe taps to targets x4 min    Asc/descend stairs 4x4     10MWT  TUG   FGA  Ambulation outside: ascending/descending curb, ascending/descending inclines, speed changes, and walking on uneven terrain x 15 minutes               ASSESSMENT  Patient presents to outpatient physical therapy with sx consistent with CVA  Patient's impairments include pain, decrease coordination, decrease functional mobility, decrease functional endurance, decrease functional strength, decrease dynamic/static balance, and impaired gait mechanics  Since starting outpatient physical therapy the patient has progressed/met with goals, has made subjective report of improvement, and has demonstrated improvement on mobility measures, and her FGA score also indicates she is at a decrease risk of experiencing a fall  However, based on her mobility measure she is at a slightly higher level of experiencing a fall and scored outside her age norms on the FGA  Patient would benefit from continued outpatient physical therapy to continue to address impairments  STG's:   - Patient will be independent with home exercise program in 2 weeks  - MET  - Patient will complete the TUG in no more than 10 seconds to demonstrate improved functional mobility  - Progress, NOT MET  -Patient will complete the 5x sit to  no more than 15 seconds to demonstrate improved functional mobility and functional strength   - Progress, NOT MET  - Patient will demonstrate an improvement in gait speed by  1m/sec to demonstrate improved functional mobility  -  MET    LTG's:  -Patient will complete the 5x sit to  no more than 11 seconds to demonstrate improved functional mobility and functional strength  - Progress, NOT MET  - Patient will demonstrate an improvement in gait speed by  2m/sec to demonstrate improved functional mobility  - MET  - Patient will complete 6 minute walk test demonstrating no more than slight gait deviations to demonstrate improved functional mobility and improved gait mechanics - MET  - Patient will score at least a 26/30 on the FGA to demonstrate improve dynamic balance and decrease her risk of experiencing a fall at time of discharge  - NEW GOAL    PLAN OF CARE  Patient will benefit from skilled outpatient physical therapy 1-2 x/wk for 8 weeks  Therapeutic interventions to include therapeutic activity, therapeutic exercise, neuromuscular re-education  as listed below in the grids

## 2019-09-12 ENCOUNTER — APPOINTMENT (OUTPATIENT)
Dept: PHYSICAL THERAPY | Facility: REHABILITATION | Age: 59
End: 2019-09-12
Payer: COMMERCIAL

## 2019-09-12 ENCOUNTER — APPOINTMENT (OUTPATIENT)
Dept: OCCUPATIONAL THERAPY | Facility: REHABILITATION | Age: 59
End: 2019-09-12
Payer: COMMERCIAL

## 2019-09-16 ENCOUNTER — OFFICE VISIT (OUTPATIENT)
Dept: PHYSICAL THERAPY | Facility: REHABILITATION | Age: 59
End: 2019-09-16
Payer: COMMERCIAL

## 2019-09-16 ENCOUNTER — EVALUATION (OUTPATIENT)
Dept: OCCUPATIONAL THERAPY | Facility: REHABILITATION | Age: 59
End: 2019-09-16
Payer: COMMERCIAL

## 2019-09-16 DIAGNOSIS — I63.9 CEREBROVASCULAR ACCIDENT (CVA), UNSPECIFIED MECHANISM (HCC): Primary | ICD-10-CM

## 2019-09-16 PROCEDURE — 97140 MANUAL THERAPY 1/> REGIONS: CPT | Performed by: OCCUPATIONAL THERAPIST

## 2019-09-16 PROCEDURE — 97110 THERAPEUTIC EXERCISES: CPT

## 2019-09-16 PROCEDURE — 97112 NEUROMUSCULAR REEDUCATION: CPT | Performed by: OCCUPATIONAL THERAPIST

## 2019-09-16 PROCEDURE — 97150 GROUP THERAPEUTIC PROCEDURES: CPT | Performed by: OCCUPATIONAL THERAPIST

## 2019-09-16 PROCEDURE — 97112 NEUROMUSCULAR REEDUCATION: CPT

## 2019-09-16 NOTE — PROGRESS NOTES
OCCUPATIONAL THERAPY RE-EVALUATION:    09/16/2019  Elizabeth Dangelo  1960  [id number]  [place] Nurse, DO  No diagnosis found  Subjective Evaluation    Quality of life: fair        "Better"  Pt is primarily Portuguese speaking  Pt with no  on this date  PATIENT GOAL: "Improve my sensation and use my LUE better"  HISTORY OF PRESENT ILLNESS:     Pt is a 60 y o  female who was referred to Occupational Therapy s/p CVA  As per hospital reports, Pt originally presented to the hospital on 6/29/2019 due to right thalamic lacunar CVA due to small vessel disease in which she is s/p TPA  Pt underwent various imaging with results identifying the following: MRI Brain W/o Contrast-- Several mm acute right thalamic lacunar infarction Mild chronic microangiopathy, CT Scan--No acute intracranial hemorrhage or mass effect,  CTA--No evidence of significant stenosis, dissection or occlusion involving cervical carotid or vertebral segments or cerebral arteries  Pt was evaluated by neurology and did have an echo  Pt did not participate in any therapy services in hospital setting  Pt was D/Carlos on 07/02/2019 to home environment  Pt with self report of paresthesias throughout LUE, decreased FMC/prehension with frequent droppage, inattention to LUE resulting in decreased functional use of LUE  Pt lives in an apartment on the seconds floor with daughter, granddaughter, and son in law--apartment has 10 steps for entry with railings on both sides for increased safety awareness  Pt performing ADLs at S status at this time for improved safety  Pts Dtr currently performing all IADLs at this time  Pt currently returning to [facility] laundry as tolerated  Pt with loss of worker rolePt worked as a  at [employer]  Pt with desires to return to worker role, if able  Pt has no history of  role         PMH:   Past Medical History:   Diagnosis Date    Gastritis          Pain Levels: Restin    With Activity:  8    Objective     Functional Assessment        Comments  See impairment section for further details      Impairment Observations:  1  Decreased L FMC/prehension with frequent droppage  2  Decreased automaticity of LUE  3  Paresthesias throughout LUE, intolerant to various touch textures  4  LUE inattention  5  Decreased LUE strength-- proximal to distal    Dynamometer Position #2:   R: 58    44  46  L: 35    26  31    R hand dominant  Action:  3 point pinch: R  7 and L 5  R 7, L 6  R 9 5, L 6  2 point pinch: R 5 and L 4 5    R 5, L 5  R 5, L 5  Lateral pinch: R 10 5 and L 8    R 11, L 8  R 13, L 8    9-hole Peg Test: RUE: 20 62 seconds, LUE: 24 30 seconds    19 24 seconds, L 20 44 seconds    R 19 16 seconds, L 20 30 seconds    Eddie Hand function Test:  Pt performed functional tasks with non-dominant hand (L)  first and then performed with dominant hand (R) following  Writing: Non-dominant  35 99 seconds (outside normal range), Dominant 11 19 seconds (WNL)  Simulated Page Turning: Non-dominant 10 53 seconds (outside normal range), Dominant 4 40 seconds (WNL)  Lifting Small, Common Objects: Non-Dominant 9 20 seconds (outside normal range), Dominant 6 66 seconds (outside normal range)  Simulated Feeding: Non-Dominant 13 68 seconds (outside normal range), Dominant 5 63 seconds (WNL)  Stacking Checkers: Non-Dominant 5 03 seconds (outside normal range), Dominant 1 69 seconds (WNL)  Lifting Large Light Objects: Non-Dominant 5 16 seconds (outside normal range), Dominant 3 79 seconds (WNL)  Lifting Large, Heavy Objects:Non-Dominant 6 09 Seconds (outside normal range),  Dominant 3 69 seconds (WNL)    Eddie Hand function Test:  Pt performed functional tasks with non-dominant hand first and then performed with dominant hand following     Writing: Non-dominant 41 05 seconds (outside the range of normal), Dominant 14 40  seconds (outside the range of normal)  Simulated Page Turning: Non-dominant 6 72 seconds (outside the range of normal), Dominant 3 42 seconds (WNL)  Lifting Small, Common Objects: Non-Dominant 8 40 seconds (outside the range of normal), Dominant 5 27 seconds (WNL)  Simulated Feeding: Non-Dominant 11 32 seconds (outside the range of normal), Dominant 7 62 seconds (WNL)  Stacking Checkers: Non-Dominant 4 83 seconds (outside the range of normal), Dominant 2 93 seconds (WNL)  Lifting Large Light Objects: Non-Dominant 4 90  seconds (outside the range of normal), Dominant 3 10 seconds (WNL)  Lifting Large, Heavy Objects:Non-Dominant 6 12  seconds(outside the range of normal),  Dominant 3 22 seconds (WNL)    Eddie Hand function Test:  Pt performed functional tasks with non-dominant hand first and then performed with dominant hand following  Writing: Non-dominant 24 18 seconds (WNL), Dominant 10 57  seconds (WNL)  Simulated Page Turning: Non-dominant 6 53 seconds (outside the range of normal), Dominant 6 85 seconds (outside the range of normal)  Lifting Small, Common Objects: Non-Dominant 10 87 seconds (outside the range of normal), Dominant 8 76 seconds (outside the range of normal)  Simulated Feeding: Non-Dominant  7 95 seconds (WNL), Dominant 5 65 seconds (WNL)  Stacking Checkers: Non-Dominant 6 14 seconds (outside the range of normal), Dominant 4 72 seconds (outside the range of normal)  Lifting Large Light Objects: Non-Dominant 4 52  seconds (outside the range of normal), Dominant 3 45seconds (WNL)  Lifting Large, Heavy Objects:Non-Dominant 4 42seconds(outside the range of normal),  Dominant 3 53 seconds (WNL)    Myofilaments: B/L 3 61; Pt with good abilities to distinguish between hot and cold abilities      B/L AROM:  Shoulder elevation: B/L full   Shoulder FF: B/L full  Shoulder ABD: B/L full with LUE heaviness  ER/IR: B/L full  Elbow ext/flex: B/L full  Sup/Pron: B/L full   Wrist flex/ext: B/L full  Composite: B/L full  Hook: B/L full  Opposition: B/L intact   Finger to nose: B/L intact  Dysdiadochokinesia: B/L intact     MMT: R 5/5, L 3+/5    R 5/5, L 4/5 with hypersensitivities to touch throughout LUE    R 5/5 throughout, L shoulder FF/ext 4/5, elbow flex/ext 5/5, wrist ext/flexion 5/5      Plan  Plan details: See skilled analysis for further details  Skilled Analysis:  Pt is a 60 y o  female referred to Occupational Therapy s/p CVA  Pt presents with decreased L FMC/prehension with frequent droppage, decreased LUE automaticity, paresthesias throughout LUE with intolerance to various touch textures, decreased LUE strength-- proximal to distal, and LUE inattention affecting Pt's functional use of LUE in daily life tasks and meaningful occupations  Pt will benefit from skilled Occupational Therapy services 2x/week for 8-10 weeks with focus on neuro re-ed, sensory re-ed, self-care management, manual tx, and a HEP to improve on the above deficits, achieve maximal level of independence, improve functional use of LUE, eventual return to worker role, and increase overall QOL  Pt demo with G functional progression towards goals in POC evident by significant improved L FMC/prehension, automaticity, and in-hand manipulation  Pt continues to be most limited by decreased LUE muscle endurance and hypsensitivities throughout LUE (primarily wrist extensions/flexors) affecting functional use of LUE  OTR recommending Pt to continue skilled OT services 2 x/week for 4 more weeks with focus on descenticization, massed practice for improved muscle endurance, manual tx, FES, and neuro re-edu to improve functional use of LUE and overall QOL       Short Term Goals:  - Pt will tolerate NMES/FES for improved L motor and sensory performance for overall improved hand to target with 75% accuracy 4 weeks-- PARTIALLY MET  - Pt will increase prehension patterns for improved tripod with utensil management with <20% droppage 4 weeks-- MET  - Pt will demo with G carryover of Home Exercise Program to improve functional progression towards goals in Plan of care and for improved functional use of LUE x 85% 4 weeks-- PARTIALLY MET  - Pt will increase automaticity of L UE to 75% for improved grasp release of tabletop items for improved functional performance with salient tasks 4 weeks-- MET  - Pt will increase rate of manipulation for all FM tests x 75% for improved functional performance with salient tasks and meaningful occupations 4 weeks-- MET  - Pt will increase L UE to Mod [place] with <20% cuing for tabletop tasks for improved functional performance of life roles and salient tasks 4 weeks--  MET  - Pt will demo with G understanding and carryover of desensitization strategies to be performed in home environment for improve tolerance to various touch sensations x 50% 4 weeks-- PARTIALLY MET    Long Term Goals:  - Pt will increase automaticity of  L UE to 95% for resumption of B integrative tasks--  MET  - Pt will increase rate of prehension for all FM tasks x 95% for improved use of utensils, writing, ADL fasteners--  MET  - Pt will resume  L hand non-dominance to independent with all activities of daily living, IADLs, and meaningful occupations-- PARTIALLY MET  - Pt will increase L UE strength to 5/5 and  strength L=R, through the use of strengthening exercises and home program for improved overall QOL-- PARTIALLY MET  - Pt will tolerate NMES/FES for improved motor and sensory performance for overall improved hand to target with 95% accuracy-- NOT MET        -    Pt will demo with G understanding and carryover of desensitization strategies to be performed in home environment for improve tolerance to various touch sensations x 85%--- NOT MET    TX NOTES:  - MH with LPS to wrist ext/flexors, followed by gentle IASTM for tone reduction and descenticization  OTR donned K tape to L bicep--> tricep and wrist extensors with continue benefits reported          OTHER PLANNED THERAPY INTERVENTIONS:   Supine, seated, and in stance neuro re-ed  NMES/FES  FMC/prehension  Timed Trials  Manual tx  Sensory re-ed  Desensitization strategies/techniques  Seated functional reach: crossing midline  WBearing strategies  B/L coordination        INTERVENTION COMMENTS:  Diagnosis: CVA  Precautions: N/A  FOTO: 48, with 68% limitation in UE function and 75% limitation in Hand function    60, with 50% limitation in UE function and 50% limitation in Hand function      Insurance: Payor: COMMERCIAL MISCELLANEOUS / Plan: COMMERCIAL MISCELLANEOUS / Product Type: Fee for Service Commercial /   7 of 8 visits through 09/17/2019, PN due 10/16/2019    [id number] 1:1  [id number] GRP  [id number] unsupervised

## 2019-09-16 NOTE — PROGRESS NOTES
Daily Note     Today's date: 2019  Patient name: Debi Soriano  : 1960  MRN: [id number]  Referring provider: Brandy Springer DO  Dx:   Encounter Diagnosis     ICD-10-CM    1  Cerebrovascular accident (CVA), unspecified mechanism ([facility] [address] ) I63 9                   Subjective: Pt reports she is doing well today  She still has concerns about her balance but has stated this has been improving  Objective: See treatment diary below    Precautions: Monitor Blood Pressure    Rest Blood Pressure 19: 135/83 mmHg, 65 bpm automatic cuff  Post-exercise Blood Pressure 19: 119/81 mmHg, 78 bpm automatic cuff              Exercise Diary 9/10 9/16   Endurance 6 minute walk test TM ambulation: 6 minutes 1 0mph    SciFit: 10 minutes, level 2,     Lower Extremity Strengthening 5x sit to stand     Static Balance      Dynamic Balance  10MWT  TUG   FGA  Ambulation outside: ascending/descending curb, ascending/descending inclines, speed changes, and walking on uneven terrain x 15 minutes Ambulation outside: ascending/descending curb, ascending/descending inclines, speed changes, and walking on uneven terrain x 15 minutes    Obstacle Course: stepping over yellow hurdles, stepping onto blue foam, tandem walk across foam balance beams, stepping onto grey disc, and stepping onto turf: 15 minutes                Assessment: Tolerated treatment well, tolerating the progression of endurance and dynamic balance exercises  Treadmill ambulation was introduced this session which the patient tolerated well without any LOBs or near misses  Dynamic balance exercises were progressed to significantly challenge the patient's balance, she tolerated this well without any LOBs but did demonstrate increase instability  Anticipate she will tolerate the progression of exercises next session  Patient exhibited good technique with therapeutic exercises and would benefit from continued PT      Plan: Continue per plan of care    Progress treatment as tolerated  Continue dual task challenges and on compliant surfaces

## 2019-09-18 ENCOUNTER — OFFICE VISIT (OUTPATIENT)
Dept: OCCUPATIONAL THERAPY | Facility: REHABILITATION | Age: 59
End: 2019-09-18
Payer: COMMERCIAL

## 2019-09-18 ENCOUNTER — OFFICE VISIT (OUTPATIENT)
Dept: PHYSICAL THERAPY | Facility: REHABILITATION | Age: 59
End: 2019-09-18
Payer: COMMERCIAL

## 2019-09-18 DIAGNOSIS — I63.9 CEREBROVASCULAR ACCIDENT (CVA), UNSPECIFIED MECHANISM (HCC): Primary | ICD-10-CM

## 2019-09-18 PROCEDURE — 97112 NEUROMUSCULAR REEDUCATION: CPT | Performed by: OCCUPATIONAL THERAPIST

## 2019-09-18 PROCEDURE — 97112 NEUROMUSCULAR REEDUCATION: CPT

## 2019-09-18 PROCEDURE — 97140 MANUAL THERAPY 1/> REGIONS: CPT | Performed by: OCCUPATIONAL THERAPIST

## 2019-09-18 NOTE — PROGRESS NOTES
Daily Note     Today's date: 2019  Patient name: Lisa Small  : 1960  MRN: [id number]  Referring provider: Deadra Apgar, DO  Dx:   Encounter Diagnosis     ICD-10-CM    1  Cerebrovascular accident (CVA), unspecified mechanism ([facility] [address] ) I63 9        Start Time: 1005  Stop Time: 1035  Total time in clinic (min): 30 minutes    Subjective: I am late because I there was a lot of traffic  Patient with 20 minute delay for visit  Agreed to modified treatment due to scheduling  Objective: See treatment diary below  Precautions: Monitor Blood Pressure        Exercise Diary 9/10 9/16      9/18     Endurance 6 minute walk test TM ambulation: 6 minutes 1 0mph     SciFit: 10 minutes, level 2,   Scifit 8 minutes    Lower Extremity Strengthening 5x sit to stand       Static Balance         Dynamic Balance  10MWT  TUG   FGA  Ambulation outside: ascending/descending curb, ascending/descending inclines, speed changes, and walking on uneven terrain x 15 minutes Ambulation outside: ascending/descending curb, ascending/descending inclines, speed changes, and walking on uneven terrain x 15 minutes     Obstacle Course: stepping over yellow hurdles, stepping onto blue foam, tandem walk across foam balance beams, stepping onto grey disc, and stepping onto turf: 15 minutes   Ambulation outside: ascending/descending curb, ascending/descending inclines, speed changes, and walking on uneven terrain x 15 minutes     Obstacle Course: stepping over yellow hurdles, stepping onto blue foam, tandem walk across foam balance beams, stepping onto grey disc, and stepping onto turf: 15 minutes                       Assessment: Tolerated treatment well  Patient demonstrated fatigue post treatment and would benefit from continued PT      Plan: Progress treatment as tolerated         INTERVENTION COMMENTS:  Diagnosis: CVA  Precautions: N/A  FOTO: 48, with 68% limitation in UE function and 75% limitation in Hand function  Insurance: Payor: COMMERCIAL MISCELLANEOUS / Plan: COMMERCIAL MISCELLANEOUS / Product Type: Fee for Service Commercial /   6 of 8 visits through 09/17/2019, PN scheduled 9/16

## 2019-09-18 NOTE — PROGRESS NOTES
Daily Note     Today's date: 2019  Patient name: Debi Soriano  : 1960  MRN: [id number]  Referring provider: Brandy Springer DO  Dx: No diagnosis found  Subjective: "Better, better"  Objective: See treatment description below    UBE x 5 minutes prograde/retrograde in seated position bilaterally with focus on improved L proximal strength, improved muscle endurance, and B/L coordination  MFR/IASTM to L wrist flexors/wrist extensors for tone reduction, sensory re-ed, and pain reduction  Sensory rollers on LUE for hypersensitivity reduction  In stance neuro re-ed with #2 t-bar-- Pt performed air alphabet with focus on proximal strength and B/L coordination  Functional reach for various resistance clamps and placement on board with ER/IR transferring from one hand to the other with focus on improved muscle endurance, pain reduction with AROM, and FMC/prehension with and without vision  Assessment: Tolerated treatment well  Patient would benefit from continued OT     Pt continues to be most limited by continuing pain and hypersensitivities in wrist flexors/extensors affecting functional use of LUE  Pt with Max griddy feel with MFR and IASTM with positive jump test in wrist flexor region  Pt with improved proximal strength and muscle endurance with less fatigue and rest breaks required  Pt demo with Mod fatigue/heaviness in LUE while performing ER/IR  Plan: Continue per plan of care        INTERVENTION COMMENTS:  Diagnosis: CVA  Precautions: N/A  FOTO: 48, with 68% limitation in UE function and 75% limitation in Hand function  Insurance: Payor: Elizabeth  / Plan: COMMERCIAL MISCELLANEOUS / Product Type: Fee for Service Commercial /   1 of 8 visits through 10/16/2019, PN due 10/16/2019

## 2019-09-23 ENCOUNTER — OFFICE VISIT (OUTPATIENT)
Dept: OCCUPATIONAL THERAPY | Facility: REHABILITATION | Age: 59
End: 2019-09-23
Payer: COMMERCIAL

## 2019-09-23 ENCOUNTER — OFFICE VISIT (OUTPATIENT)
Dept: PHYSICAL THERAPY | Facility: REHABILITATION | Age: 59
End: 2019-09-23
Payer: COMMERCIAL

## 2019-09-23 DIAGNOSIS — I63.9 CEREBROVASCULAR ACCIDENT (CVA), UNSPECIFIED MECHANISM (HCC): Primary | ICD-10-CM

## 2019-09-23 PROCEDURE — 97110 THERAPEUTIC EXERCISES: CPT | Performed by: OCCUPATIONAL THERAPIST

## 2019-09-23 PROCEDURE — 97530 THERAPEUTIC ACTIVITIES: CPT | Performed by: OCCUPATIONAL THERAPIST

## 2019-09-23 PROCEDURE — 97112 NEUROMUSCULAR REEDUCATION: CPT

## 2019-09-23 PROCEDURE — 97150 GROUP THERAPEUTIC PROCEDURES: CPT

## 2019-09-23 PROCEDURE — 97112 NEUROMUSCULAR REEDUCATION: CPT | Performed by: OCCUPATIONAL THERAPIST

## 2019-09-23 PROCEDURE — 97110 THERAPEUTIC EXERCISES: CPT

## 2019-09-23 NOTE — PROGRESS NOTES
Daily Note     Today's date: 2019  Patient name: Nichol Springer  : 1960  MRN: [id number]  Referring provider: Nova Fillers, DO  Dx:   Encounter Diagnosis     ICD-10-CM    1  Cerebrovascular accident (CVA), unspecified mechanism ([facility] [address] ) I63 9                   Subjective: Patient reports she is doing well today and denies any falls since her last visit  Objective: See treatment diary below  Precautions: Monitor Blood Pressure   Group Treatment Time: 900-915     Exercise Diary    Endurance TM ambulation: 6 minutes 1 0mph     SciFit: 10 minutes, level 2,   Scifit 8 minutes SciFit: level 2, 10 minutes     Lower Extremity Strengthening        Static Balance        Dynamic Balance  Ambulation outside: ascending/descending curb, ascending/descending inclines, speed changes, and walking on uneven terrain x 15 minutes     Obstacle Course: stepping over yellow hurdles, stepping onto blue foam, tandem walk across foam balance beams, stepping onto grey disc, and stepping onto turf: 15 minutes   Ambulation outside: ascending/descending curb, ascending/descending inclines, speed changes, and walking on uneven terrain x 15 minutes     Obstacle Course: stepping over yellow hurdles, stepping onto blue foam, tandem walk across foam balance beams, stepping onto grey disc, and stepping onto turf: 15 minutes  Obstacle Course: stepping over yellow hurdles, stepping onto blue foam, tandem walk across foam balance beams, stepping onto grey disc, and stepping onto turf: 15 minutes    Marchinft x 6    Marching with alternating knee taps: 30ft x 6                       Assessment: Tolerated treatment well, tolerating the progression of endurance and dynamic balance exercises  No LOBs observed during the session but she did demonstrate scuffing of her feet when she became fatigued  Towards the end of the session Oval Shayan demonstrated an increase in instability as she became more fatigue   Patient demonstrated fatigue post treatment and would benefit from continued PT      Plan: Progress treatment as tolerated  INTERVENTION COMMENTS:  Diagnosis: CVA  Precautions: N/A  FOTO: 48, with 68% limitation in UE function and 75% limitation in Hand function  Insurance: Payor: Elizabeth  / Plan: COMMERCIAL MISCELLANEOUS / Product Type: Fee for Service Commercial /   6 of 8 visits through 09/17/2019, PN scheduled 9/16

## 2019-09-23 NOTE — PROGRESS NOTES
Daily Note     Today's date: 2019  Patient name: Debi Soriano  : 1960  MRN: [id number]  Referring provider: Brandy Springer DO  Dx:   Encounter Diagnosis     ICD-10-CM    1  C[facility] accident (CV[facility]), unspecified mechanism [facility] I63 9                   Subjective: "Pain and pressure in my left arm "       Objective: See treatment description below  6min x prograde/retrograde on UBE at level 2 resistance  FES to wrist extensors w/ LUE on elevated wedge simulatenously performing wrist extension w/ 1# DB for sensory re-ed and strength  IASTM completed to LUE forearm only focusing on sensory re-ed and tone reduction  Followed by sensory rollers over forearm w/ no noted increase in sensitivities  Pt engaged in trail making activity w/ power web utilizing the Green clip w/ velcro on end for sensory re-ed  Pt retrieved marbles using a 3 jaw check focusing on grasp release and hand strength  Assessment: Tolerated treatment well  Patient would benefit from continued OT  Pt reported heaviness and pressure in LUE forearm  Sensitivity and tightness of forearm  noted during IASTM and sensory rollers  Plan: Continue per plan of care        INTERVENTION COMMENTS:  Diagnosis: CVA  Precautions: N/A  FOTO: 48, with 68% limitation in UE function and 75% limitation in Hand function  Insurance: Payor: Elizabeth  / Plan: COMMERCIAL MISCELLANEOUS / Product Type: Fee for Service Commercial /   2 of 8 visits through 10/16/2019, PN due 10/16/2019  Pt was treated by JOSELO Victor, under direct supervision of Denise Jiang MS, OTR/L

## 2019-09-25 ENCOUNTER — OFFICE VISIT (OUTPATIENT)
Dept: OCCUPATIONAL THERAPY | Facility: REHABILITATION | Age: 59
End: 2019-09-25
Payer: COMMERCIAL

## 2019-09-25 ENCOUNTER — OFFICE VISIT (OUTPATIENT)
Dept: PHYSICAL THERAPY | Facility: REHABILITATION | Age: 59
End: 2019-09-25
Payer: COMMERCIAL

## 2019-09-25 ENCOUNTER — TRANSCRIBE ORDERS (OUTPATIENT)
Dept: ADMINISTRATIVE | Facility: HOSPITAL | Age: 59
End: 2019-09-25

## 2019-09-25 DIAGNOSIS — I63.9 CEREBROVASCULAR ACCIDENT (CVA), UNSPECIFIED MECHANISM (HCC): Primary | ICD-10-CM

## 2019-09-25 DIAGNOSIS — Z12.31 ENCOUNTER FOR SCREENING MAMMOGRAM FOR MALIGNANT NEOPLASM OF BREAST: Primary | ICD-10-CM

## 2019-09-25 PROCEDURE — 97110 THERAPEUTIC EXERCISES: CPT

## 2019-09-25 PROCEDURE — 97140 MANUAL THERAPY 1/> REGIONS: CPT | Performed by: OCCUPATIONAL THERAPIST

## 2019-09-25 PROCEDURE — 97112 NEUROMUSCULAR REEDUCATION: CPT

## 2019-09-25 PROCEDURE — 97112 NEUROMUSCULAR REEDUCATION: CPT | Performed by: OCCUPATIONAL THERAPIST

## 2019-09-25 NOTE — PROGRESS NOTES
Daily Note     Today's date: 2019  Patient name: Lakshmi Cordero  : 1960  MRN: [id number]  Referring provider: Tiffany Valdez DO  Dx:   Encounter Diagnosis     ICD-10-CM    1  Cerebrovascular accident (CVA), unspecified mechanism ([facility] [address] ) I63 9        Start Time: 1015  Stop Time: 1100  Total time in clinic (min): 45 minutes    Subjective: Patient reports resolution of numbness left LE  I still have some difficulty with my balance        Objective: See treatment diary below  Precautions: Monitor Blood Pressure   Group Treatment Time: 900-915     Exercise Diary    Endurance TM ambulation: 6 minutes 1 0mph     SciFit: 10 minutes, level 2,   Scifit 8 minutes SciFit: level 2, 10 minutes  Scifit level 2 10 minutes    Lower Extremity Strengthening     Mini squatts at the bar    Static Balance     [employer]  LOS med static 46%  diffi 24%  Maze: easy 65%  Medium -56, 0%    Random control easy 3' 50%    Dynamic Balance  Ambulation outside: ascending/descending curb, ascending/descending inclines, speed changes, and walking on uneven terrain x 15 minutes     Obstacle Course: stepping over yellow hurdles, stepping onto blue foam, tandem walk across foam balance beams, stepping onto grey disc, and stepping onto turf: 15 minutes   Ambulation outside: ascending/descending curb, ascending/descending inclines, speed changes, and walking on uneven terrain x 15 minutes     Obstacle Course: stepping over yellow hurdles, stepping onto blue foam, tandem walk across foam balance beams, stepping onto grey disc, and stepping onto turf: 15 minutes  Obstacle Course: stepping over yellow hurdles, stepping onto blue foam, tandem walk across foam balance beams, stepping onto grey disc, and stepping onto turf: 15 minutes    Marchinft x 6    Marching with alternating knee taps: 30ft x 6     Outdoor ambulation:  Even/uneven pavement, grass, rocks,  Cement rin crossover taps, 4 inch wide surface ambulation  15 minutes    Marching with alternating knee taps: 30ft x 6   Sidesteps 30 ft x 4                          Assessment: Tolerated treatment well, tolerating the progression of endurance and dynamic balance exercises  No LOBs observed during the session but she did demonstrate scuffing of her feet when she became fatigued  Towards the end of the session Rebeca Graham demonstrated an increase in instability as she became more fatigue  Patient demonstrated fatigue post treatment and would benefit from continued PT      Plan: Progress treatment as tolerated  INTERVENTION COMMENTS:  Diagnosis: CVA  Precautions: N/A  FOTO: 48, with 68% limitation in UE function and 75% limitation in Hand function  Insurance: Payor: Elizabeth Newton / Plan: COMMERCIAL MISCELLANEOUS / Product Type: Fee for Service Commercial /   6 of 8 visits through 09/17/2019, PN scheduled 9/16

## 2019-09-25 NOTE — PROGRESS NOTES
Daily Note     Today's date: 2019  Patient name: Eliazar Oliver  : 1960  MRN: [id number]  Referring provider: Hardik Ness DO  Dx: No diagnosis found  Subjective: "Tight tight"  Objective: See treatment description below    UBE x 6 minutes prograde/retrograde in seated position bilaterally with focus on improved muscle endurance and proximal strength  IASTM to L bicep/tricep, wrist ext/flexors, and hand/digits for improved tone reduction and reduced hypersensitivities continuing to be present  Supine neuro re-ed-- Pt performed 3 sets x 10 reps of shoulder FF with BB retrieval and drop at full shoulder FF range with #2 wrist weight focusing on improve proximal strength,  with vision occluded, and improved muscle endurance  Golf devan placement in Styrofoam alternating between each digit, followed by water bead retrieval and placement on top of golf devan with focus on sensory re-ed, hand to target accuracy, and intrinsic strength  OTR upgraded functional task to in-hand manipulation demands palm to digit translation of water beads  Velcro board with focus on achieving various pinch/ formations, intrinsic/ strength, and sensory re-ed  K-tape to wrist flexors and bicep region for tone/pain reduction  Assessment: Tolerated treatment well  Patient would benefit from continued OT    Pt continues to present with hypersenstivities of wrist flexors/bicep/tricep regions with positive jump test and Max griddy feel with MFR and IASTM  Pt demo with improved in-hand manipulation and FMC/prehension with vision occluded improving accuracy of functional tasks  Pt able to tolerated water beads and velcro texture without hypersensitivities on this date  Plan: Continue per plan of care        INTERVENTION COMMENTS:  Diagnosis: CVA  Precautions: N/A  FOTO: 48, with 68% limitation in UE function and 75% limitation in Hand function  Insurance: Payor: COMMERCIAL MISCELLANEOUS / Plan: COMMERCIAL MISCELLANEOUS / Product Type: Fee for Service Commercial /   3 of 8 visits through 10/16/2019, PN scheduled 9/16

## 2019-09-30 ENCOUNTER — OFFICE VISIT (OUTPATIENT)
Dept: OCCUPATIONAL THERAPY | Facility: REHABILITATION | Age: 59
End: 2019-09-30
Payer: COMMERCIAL

## 2019-09-30 ENCOUNTER — OFFICE VISIT (OUTPATIENT)
Dept: PHYSICAL THERAPY | Facility: REHABILITATION | Age: 59
End: 2019-09-30
Payer: COMMERCIAL

## 2019-09-30 DIAGNOSIS — I63.9 CEREBROVASCULAR ACCIDENT (CVA), UNSPECIFIED MECHANISM (HCC): Primary | ICD-10-CM

## 2019-09-30 PROCEDURE — 97112 NEUROMUSCULAR REEDUCATION: CPT | Performed by: OCCUPATIONAL THERAPIST

## 2019-09-30 PROCEDURE — 97110 THERAPEUTIC EXERCISES: CPT

## 2019-09-30 PROCEDURE — 97140 MANUAL THERAPY 1/> REGIONS: CPT | Performed by: OCCUPATIONAL THERAPIST

## 2019-09-30 PROCEDURE — 97112 NEUROMUSCULAR REEDUCATION: CPT

## 2019-09-30 NOTE — PROGRESS NOTES
Daily Note     Today's date: 2019  Patient name: Carmelo Flores  : 1960  MRN: [id number]  Referring provider: Nataliia Villarreal DO  Dx:   Encounter Diagnosis     ICD-10-CM    1  Cerebrovascular accident (CVA), unspecified mechanism ([facility] [address] ) I63 9        Start Time: 1  Stop Time: 7468  Total time in clinic (min): 40 minutes    Subjective: Patient reports she is doing her exercises regularly, sometimes her L foot still trips her         Objective: See treatment diary below  Precautions: Monitor Blood Pressure   Group Treatment Time: 900-704     Exercise Diary    Endurance TM ambulation: 6 minutes 1 0mph     SciFit: 10 minutes, level 2,   Scifit 8 minutes SciFit: level 2, 10 minutes  Scifit level 2 10 minutes Scifit level 2 2 10 minutes    Lower Extremity Strengthening     Mini squatts at the bar     Static Balance     BIODEX  LOS med static 46%  diffi 24%  Maze: easy 65%  Medium -56, 0%    Random control easy 3' 50%     Dynamic Balance  Ambulation outside: ascending/descending curb, ascending/descending inclines, speed changes, and walking on uneven terrain x 15 minutes     Obstacle Course: stepping over yellow hurdles, stepping onto blue foam, tandem walk across foam balance beams, stepping onto grey disc, and stepping onto turf: 15 minutes   Ambulation outside: ascending/descending curb, ascending/descending inclines, speed changes, and walking on uneven terrain x 15 minutes     Obstacle Course: stepping over yellow hurdles, stepping onto blue foam, tandem walk across foam balance beams, stepping onto grey disc, and stepping onto turf: 15 minutes  Obstacle Course: stepping over yellow hurdles, stepping onto blue foam, tandem walk across foam balance beams, stepping onto grey disc, and stepping onto turf: 15 minutes    Marchinft x 6    Marching with alternating knee taps: 30ft x 6     Outdoor ambulation:  Even/uneven pavement, grass, rocks,  Cement rin crossover taps, 4 inch wide surface ambulation  15 minutes    Marching with alternating knee taps: 30ft x 6   Sidesteps 30 ft x 4     Outdoor ambulation:  Even/uneven pavement, grass, rocks x12 min    Marching with alternating knee taps: 30ft x 6   Stepping stone activity on compliant surfaces 20 ftx8  High and low obstacle step over 20ft x8    Toe taps on 18" surface x20 B  Lul's 20ft x4                       Assessment: Tolerated treatment well, no evidence of scuffing or LOB noted during ambulation, pt steady on varying surfaces outdoors  We continued balance focus on increased LE clearance and single leg stance, pt completed all exercises w/out LOB observed  Patient exhibited good technique with therapeutic exercises and would benefit from continued PT      Plan: Progress treatment as tolerated

## 2019-09-30 NOTE — PROGRESS NOTES
Daily Note     Today's date: 2019  Patient name: Carl Canseco  : 1960  MRN: [id number]  Referring provider: Elroy Blank DO  Dx: No diagnosis found  Subjective: "I had heaviness in the middle of the night"  Objective: See treatment description below    UBE x 6 minutes prograde/retrograde in seated position bilaterally x 3 resistance level with focus on improved proximal strength and muscle endurance  IASTM/MFR to bicep, wrist extension/flexors, and hand/digits with focus on tone reduction and reduced hypersensitivities  Water beads rolled to LUE to reduce hypersensitivities to various textures  Santinoedinson Sue in quadruped throughout BUE alternating UE for peg retrieval and placement to ped board copying design with focus on improved proximal strength/stability, proprioceptive input, and hand to target accuracy  Knot tying in theraband with focus on sensory re-ed to different textures, B/L coordination, and intrinsic strength  K-tape applied to wrist flexors and bicep for tone reduction and sensory re-ed  Assessment: Tolerated treatment well  Patient would benefit from continued OT    Pt continues to present with hypersensitivities to wrist flexor/bicep regions, though tolerance is slowly improving  Pt with self-report of reduced pain, though heaviness remains throughout LUE  Pt demo with improved muscle endurance and improved proximal strength with abilities to tolerance increased resistance on UBE and WBearing demands in quadruped position  Pt responds positively to K-tape with benefits evident  Plan: Continue per plan of care  INTERVENTION COMMENTS:  Diagnosis: CVA  Precautions: N/A  FOTO: 48, with 68% limitation in UE function and 75% limitation in Hand function  Insurance: Payor: Elizabeth Newton / Plan: COMMERCIAL MISCELLANEOUS / Product Type: Fee for Service Commercial /   4 of 8 visits through 2019, PN scheduled

## 2019-10-02 ENCOUNTER — OFFICE VISIT (OUTPATIENT)
Dept: PHYSICAL THERAPY | Facility: REHABILITATION | Age: 59
End: 2019-10-02
Payer: COMMERCIAL

## 2019-10-02 ENCOUNTER — OFFICE VISIT (OUTPATIENT)
Dept: OCCUPATIONAL THERAPY | Facility: REHABILITATION | Age: 59
End: 2019-10-02
Payer: COMMERCIAL

## 2019-10-02 DIAGNOSIS — I63.9 CEREBROVASCULAR ACCIDENT (CVA), UNSPECIFIED MECHANISM (HCC): Primary | ICD-10-CM

## 2019-10-02 PROCEDURE — 97110 THERAPEUTIC EXERCISES: CPT

## 2019-10-02 PROCEDURE — 97112 NEUROMUSCULAR REEDUCATION: CPT

## 2019-10-02 PROCEDURE — 97140 MANUAL THERAPY 1/> REGIONS: CPT | Performed by: OCCUPATIONAL THERAPIST

## 2019-10-02 PROCEDURE — 97112 NEUROMUSCULAR REEDUCATION: CPT | Performed by: OCCUPATIONAL THERAPIST

## 2019-10-02 NOTE — PROGRESS NOTES
Daily Note     Today's date: 10/2/2019  Patient name: Dakotah Dunbar  : 1960  MRN: [id number]  Referring provider: Duane Gandara DO  Dx:   Encounter Diagnosis     ICD-10-CM    1  Cerebrovascular accident (CVA), unspecified mechanism ([facility] [address] ) I63 9                   Subjective: Patient reports she is doing her exercises regularly, sometimes her L foot still trips her  Objective: See treatment diary below    Precautions: Monitor Blood Pressure       Exercise Diary   9/25 9/30 10/2   Endurance Scifit level 2 10 minutes Scifit level 2 2 10 minutes Scifit level 2 5 12 minutes    Lower Extremity Strengthening Mini squatts at the bar  30 sec STS x3: 10, 9, 10    Static Balance BIODEX  LOS med static 46%  diffi 24%  Maze: easy 65%  Medium -56, 0%    Random control easy 3' 50%  Lateral weight shift on biodex x2 min, unlocked 12, 75%     Dynamic Balance    Outdoor ambulation:  Even/uneven pavement, grass, rocks,  Cement rin crossover taps, 4 inch wide surface ambulation  15 minutes    Marching with alternating knee taps: 30ft x 6   Sidesteps 30 ft x 4     Outdoor ambulation:  Even/uneven pavement, grass, rocks x12 min    Marching with alternating knee taps: 30ft x 6   Stepping stone activity on compliant surfaces 20 ftx8  High and low obstacle step over 20ft x8    Toe taps on 18" surface x20 B  Karaoke's 20ft x4 Sit to stand to high, medium, and low chair heights, w/ compliant and non compliant surfaces x5 min    Marching w/ alt knee taps 30ft x4    Firm toe taps in ea direction x30 B, then repeated on foam                     Assessment: Tolerated treatment well, pt is completing advanced balance challenges w/ dynamic and single leg stance exercises w/out LOB  Patient exhibited good technique with therapeutic exercises and would benefit from continued PT      Plan: Progress treatment as tolerated    Continue challenges in single leg stance and on foam

## 2019-10-02 NOTE — PROGRESS NOTES
Daily Note     Today's date: 10/2/2019  Patient name: Hai Moreno  : 1960  MRN: [id number]  Referring provider: Nguyen Vasquez DO  Dx: No diagnosis found  Subjective: "I don't like the feel of that"  Objective: See treatment description below    Pt with self-report of heaviness and paresthesias in ulnar side of hand/forearm upon arrival     UBE x 6 minutes prograde/retrograde in seated position bilaterally x 3 resistance level with focus on improved proximal strength and muscle endurance        IASTM/MFR to bicep, wrist extension/flexors, and hand/digits with focus on tone reduction and reduced hypersensitivities  Sensory rollers to L wrist flexors and bicep for improved hypersensitivities to various textures  In stance functional reach crossing midline for card retrieval utilizing 3-jaw craig pinch with blue/black resistive clothes pins with focus on hand to target accuracy, intrinsic strength, and L muscle endurance  Assessment: Tolerated treatment well  Patient would benefit from continued OT     Pt presents with continuing hypersenstivities and Max griddy feel to medial wrist flexors with intolerance to IASTM and sensory rollers  Pt with improved tolerance to manual tx and various textures to bicep  Pt with significant improved intrinsic strength and improved hand to target accuracy/proprioception with LUE with task completion at appropriate pace and with 0% occasions of dysmetria  Plan: Continue per plan of care  INTERVENTION COMMENTS:  Diagnosis: CVA  Precautions: N/A  FOTO: 48, with 68% limitation in UE function and 75% limitation in Hand function  Insurance: Payor: Elizabeth  / Plan: COMMERCIAL MISCELLANEOUS / Product Type: Fee for Service Commercial /   5 of 8 visits through 10/16/2019, PN scheduled 10/16

## 2019-10-07 ENCOUNTER — OFFICE VISIT (OUTPATIENT)
Dept: PHYSICAL THERAPY | Facility: REHABILITATION | Age: 59
End: 2019-10-07
Payer: COMMERCIAL

## 2019-10-07 ENCOUNTER — OFFICE VISIT (OUTPATIENT)
Dept: OCCUPATIONAL THERAPY | Facility: REHABILITATION | Age: 59
End: 2019-10-07
Payer: COMMERCIAL

## 2019-10-07 DIAGNOSIS — I63.9 CEREBROVASCULAR ACCIDENT (CVA), UNSPECIFIED MECHANISM (HCC): Primary | ICD-10-CM

## 2019-10-07 PROCEDURE — 97140 MANUAL THERAPY 1/> REGIONS: CPT | Performed by: OCCUPATIONAL THERAPIST

## 2019-10-07 PROCEDURE — 97530 THERAPEUTIC ACTIVITIES: CPT | Performed by: OCCUPATIONAL THERAPIST

## 2019-10-07 PROCEDURE — 97110 THERAPEUTIC EXERCISES: CPT | Performed by: OCCUPATIONAL THERAPIST

## 2019-10-07 PROCEDURE — 97110 THERAPEUTIC EXERCISES: CPT

## 2019-10-07 PROCEDURE — 97112 NEUROMUSCULAR REEDUCATION: CPT

## 2019-10-07 NOTE — PROGRESS NOTES
Daily Note     Today's date: 10/7/2019  Patient name: Calixto Watkins  : 1960  MRN: [id number]  Referring provider: Barb Christine DO  Dx:   Encounter Diagnosis     ICD-10-CM    1  Cerebrovascular accident (CVA), unspecified mechanism ([facility] [address] ) I63 9                   Subjective: Pt report "heaviness and tingling in LUE "       Objective: See treatment description below    Pt 6min x prograde/retrograde seated at UBE at resistance level 3 focusing on muscular endurance/strength  IASTM to LUE bicep and wrist extensors/flexors focusing on sensory re-ed  Pt in quadruped on mat table facilitating UE strengthening and proprioceptive feedback  Pt engaged in peg placing activity w/ pegs placed on both side of board facilitating crossing midline alternating RUE/LUE to retrieve and place pegs in board followed by retrieval of marbles w/ velcro green resistive clip to place on top of pegs focusing on proprioceptive input w/ hand to target demands and hand strengthening  Pt dissembled activity to increase time in quadruped position for increased muscular strength  Pt engaged in table top rubber band geoboard pattern activity focusing on LUE coordination, in hand manipulation, and sensory re-ed  Pt completed 4x wheel well w/ 3lb therabar focusing on B/L forearm strengthening  Ktape applied to wrist extensors and flexors for sensory re-ed and tone reduction  Assessment: Tolerated treatment well  Patient would benefit from continued OT  Pt report of heaviness and tingling throughout LUE at start of session  Pt tolerated IASTM well w/ sensitivity reported in wrist flexors region  Plan: Continue per plan of care        INTERVENTION COMMENTS:  Diagnosis: CVA  Precautions: N/A  FOTO: 48, with 68% limitation in UE function and 75% limitation in Hand function  Insurance: Payor: Elizabeth Newton / Plan: COMMERCIAL MISCELLANEOUS / Product Type: Fee for Service Commercial /   7 of 8 visits through 10/16/2019, PN scheduled 10/16     Pt was treated by JOSELO Crisostomo, under direct supervision of Katherine Hicks MS OTR/JALEN

## 2019-10-07 NOTE — PROGRESS NOTES
Daily Note     Today's date: 10/7/2019  Patient name: Caitlyn Bullard  : 1960  MRN: [id number]  Referring provider: Mariama Melo DO  Dx:   Encounter Diagnosis     ICD-10-CM    1  Cerebrovascular accident (CVA), unspecified mechanism ([facility] [address] ) I63 9                 Visit Tracking:  Insurance: [facility]  Session:   Authorization Start Date: 9/10/2019  Authorization End Date: 2019  Initial Evaluation Completed on: 2019  Re-Evaluation Due: 10/28/2019    Subjective: Patient reports she is doing her exercises regularly, sometimes her L foot still trips her  Objective: See treatment diary below    Precautions: Monitor Blood Pressure       Exercise Diary 9/30 10/2 10/7   Endurance Scifit level 2 2 10 minutes Scifit level 2 5 12 minutes Scifit level 2 5 12 minutes    Lower Extremity Strengthening  30 sec STS x3: 10, 9, 10     Static Balance  Lateral weight shift on biodex x2 min, unlocked 12, 75%      Dynamic Balance  Outdoor ambulation:  Even/uneven pavement, grass, rocks x12 min    Marching with alternating knee taps: 30ft x 6   Stepping stone activity on compliant surfaces 20 ftx8  High and low obstacle step over 20ft x8    Toe taps on 18" surface x20 B  Karaoke's 20ft x4 Sit to stand to high, medium, and low chair heights, w/ compliant and non compliant surfaces x5 min    Marching w/ alt knee taps 30ft x4    Firm toe taps in ea direction x30 B, then repeated on foam  Outdoor ambulation:  Even/uneven pavement, grass, rocks x12 min    Marching with knee taps: 30ft x 6    Tandem walkinft x 6    Backwards walkinft x 6    Lateral Crossover walkinft x 6                    Assessment: Tolerated treatment well, she continues to tolerate advanced dynamic balance exercises without any LOBs but instability is noted at time  Angelic Galvan continues to demonstrate improved dynamic balance and gait mechanics when ambulating outside   However, once she becomes fatigue she demonstrates decrease toe clearance on the left with a few scuffs noted during the session  Anticipate potentially discharge at patient's re-evaluation, pending her progress  Patient exhibited good technique with therapeutic exercises and would benefit from continued PT      Plan: Progress treatment as tolerated    Continue challenges in single leg stance and on foam

## 2019-10-09 ENCOUNTER — APPOINTMENT (OUTPATIENT)
Dept: OCCUPATIONAL THERAPY | Facility: REHABILITATION | Age: 59
End: 2019-10-09
Payer: COMMERCIAL

## 2019-10-09 ENCOUNTER — APPOINTMENT (OUTPATIENT)
Dept: PHYSICAL THERAPY | Facility: REHABILITATION | Age: 59
End: 2019-10-09
Payer: COMMERCIAL

## 2019-10-16 ENCOUNTER — EVALUATION (OUTPATIENT)
Dept: OCCUPATIONAL THERAPY | Facility: REHABILITATION | Age: 59
End: 2019-10-16
Payer: COMMERCIAL

## 2019-10-16 ENCOUNTER — OFFICE VISIT (OUTPATIENT)
Dept: PHYSICAL THERAPY | Facility: REHABILITATION | Age: 59
End: 2019-10-16
Payer: COMMERCIAL

## 2019-10-16 DIAGNOSIS — I63.9 CEREBROVASCULAR ACCIDENT (CVA), UNSPECIFIED MECHANISM (HCC): Primary | ICD-10-CM

## 2019-10-16 PROCEDURE — 97140 MANUAL THERAPY 1/> REGIONS: CPT | Performed by: OCCUPATIONAL THERAPIST

## 2019-10-16 PROCEDURE — 97112 NEUROMUSCULAR REEDUCATION: CPT | Performed by: OCCUPATIONAL THERAPIST

## 2019-10-16 PROCEDURE — 97112 NEUROMUSCULAR REEDUCATION: CPT

## 2019-10-16 PROCEDURE — 97110 THERAPEUTIC EXERCISES: CPT

## 2019-10-16 NOTE — PROGRESS NOTES
Daily Note     Today's date: 10/16/2019  Patient name: Tamiko Moser  : 1960  MRN: [id number]  Referring provider: Ketty Casper DO  Dx:   Encounter Diagnosis     ICD-10-CM    1  Cerebrovascular accident (CVA), unspecified mechanism ([facility] [address] ) I63 9        Start Time: 1000  Stop Time: 1045  Total time in clinic (min): 45 minutes  Visit Tracking:  Insurance: [facility]  Session:   Authorization Start Date: 9/10/2019  Authorization End Date: 2019  Initial Evaluation Completed on: 2019  Re-Evaluation Due: 10/28/2019    Subjective: Client reports is participating in more community activities  She is planning a trip to [facility] to see her ailing mother Oct 24th      Objective: See treatment diary below    Precautions: Monitor Blood Pressure       Exercise Diary 9/30 10/2 10/7   10/16   Endurance Scifit level 2 2 10 minutes Scifit level 2 5 12 minutes Scifit level 2 5 12 minutes Scifit level 2 5 12 minutes    Lower Extremity Strengthening  30 sec STS x3: 10, 9, 10   Step ups 6"  15 x 2 forwards/ sideway    Lunges  60' x 2      Static Balance  Lateral weight shift on biodex x2 min, unlocked 12, 75%       Dynamic Balance  Outdoor ambulation:  Even/uneven pavement, grass, rocks x12 min    Marching with alternating knee taps: 30ft x 6   Stepping stone activity on compliant surfaces 20 ftx8  High and low obstacle step over 20ft x8    Toe taps on 18" surface x20 B  Karaoke's 20ft x4 Sit to stand to high, medium, and low chair heights, w/ compliant and non compliant surfaces x5 min    Marching w/ alt knee taps 30ft x4    Firm toe taps in ea direction x30 B, then repeated on foam  Outdoor ambulation:  Even/uneven pavement, grass, rocks x12 min    Marching with knee taps: 30ft x 6    Tandem walkinft x 6    Backwards walkinft x 6    Lateral Crossover walkinft x 6  Ambulation with treadmill without use of hands for support initiated  7 mph- 1 2 with  LOB with head turns,able to correct     Marching up incline foam x 6    Backwards on     Forward progression head turns vert/ horizontal 60' x 2                         Assessment: Tolerated treatment well  Patient is making steady improvement  Does present with Left LE weakness  Initiated functional strengthening program to promote increased strength and facilitate left foot clearance  Patient exhibited good technique with therapeutic exercises and would benefit from continued PT      Plan: Progress treatment as tolerated               +

## 2019-10-16 NOTE — PROGRESS NOTES
OCCUPATIONAL THERAPY RE-EVALUATION:    10/16/2019  Suyapa Andred  1960  [id number]  Evy Daniel, DO  No diagnosis found  Subjective Evaluation    Quality of life: fair        "I feel like I am doing good  Only this arm is heavy"  Pt is primarily Amharic speaking  Pt with no  on this date  PATIENT GOAL: "Improve my sensation and use my LUE better"  HISTORY OF PRESENT ILLNESS:     Pt is a 60 y o  female who was referred to Occupational Therapy s/p CVA  As per hospital reports, Pt originally presented to the hospital on 6/29/2019 due to right thalamic lacunar CVA due to small vessel disease in which she is s/p TPA  Pt underwent various imaging with results identifying the following: MRI Brain W/o Contrast-- Several mm acute right thalamic lacunar infarction Mild chronic microangiopathy, CT Scan--No acute intracranial hemorrhage or mass effect,  CTA--No evidence of significant stenosis, dissection or occlusion involving cervical carotid or vertebral segments or cerebral arteries  Pt was evaluated by neurology and did have an echo  Pt did not participate in any therapy services in hospital setting  Pt was D/Carlos on 07/02/2019 to home environment  Pt with self report of paresthesias throughout LUE, decreased FMC/prehension with frequent droppage, inattention to LUE resulting in decreased functional use of LUE  Pt lives in an apartment on the seconds floor with daughter, granddaughter, and son in law--apartment has 10 steps for entry with railings on both sides for increased safety awareness  Pt performing ADLs at S status at this time for improved safety  Pts Dtr currently performing all IADLs at this time  Pt currently returning to [employer] as tolerated  Pt with loss of worker rolePt worked as a  at [employer]  Pt with desires to return to worker role, if able  Pt has no history of  role         PMH:   Past Medical History:   Diagnosis Date    Gastritis          Pain Levels:     Restin    With Activity:  8    Pain reported in LUE  Objective     Functional Assessment        Comments  See impairment section for further details      Impairment Observations:  1  Decreased L FMC/prehension with frequent droppage  2  Decreased automaticity of LUE  3  Paresthesias throughout LUE, intolerant to various touch textures  4  LUE inattention  5  Decreased LUE strength-- proximal to distal    Dynamometer Position #2:   R: 58    44  46  55  L: 35    26  31  39    R hand dominant  Action:  3 point pinch: R  7 and L 5  R 7, L 6  R 9 5, L 6  R 10 5, L 9  2 point pinch: R 5 and L 4 5    R 5, L 5  R 5, L 5  R 7, L 5  Lateral pinch: R 10 5 and L 8    R 11, L 8  R 13, L 8  R 13, L 10 5    9-hole Peg Test: RUE: 20 62 seconds, LUE: 24 30 seconds    19 24 seconds, L 20 44 seconds    R 19 16 seconds, L 20 30 seconds    R 19 69 seconds, L 18 57 seconds    Eddie Hand function Test:  Pt performed functional tasks with non-dominant hand (L)  first and then performed with dominant hand (R) following     Writing: Non-dominant  35 99 seconds (outside normal range), Dominant 11 19 seconds (WNL)  Simulated Page Turning: Non-dominant 10 53 seconds (outside normal range), Dominant 4 40 seconds (WNL)  Lifting Small, Common Objects: Non-Dominant 9 20 seconds (outside normal range), Dominant 6 66 seconds (outside normal range)  Simulated Feeding: Non-Dominant 13 68 seconds (outside normal range), Dominant 5 63 seconds (WNL)  Stacking Checkers: Non-Dominant 5 03 seconds (outside normal range), Dominant 1 69 seconds (WNL)  Lifting Large Light Objects: Non-Dominant 5 16 seconds (outside normal range), Dominant 3 79 seconds (WNL)  Lifting Large, Heavy Objects:Non-Dominant 6 09 Seconds (outside normal range),  Dominant 3 69 seconds (WNL)    Eddie Hand function Test:  Pt performed functional tasks with non-dominant hand first and then performed with dominant hand following  Writing: Non-dominant 41 05 seconds (outside the range of normal), Dominant 14 40  seconds (outside the range of normal)  Simulated Page Turning: Non-dominant 6 72 seconds (outside the range of normal), Dominant 3 42 seconds (WNL)  Lifting Small, Common Objects: Non-Dominant 8 40 seconds (outside the range of normal), Dominant 5 27 seconds (WNL)  Simulated Feeding: Non-Dominant 11 32 seconds (outside the range of normal), Dominant 7 62 seconds (WNL)  Stacking Checkers: Non-Dominant 4 83 seconds (outside the range of normal), Dominant 2 93 seconds (WNL)  Lifting Large Light Objects: Non-Dominant 4 90  seconds (outside the range of normal), Dominant 3 10 seconds (WNL)  Lifting Large, Heavy Objects:Non-Dominant 6 12  seconds(outside the range of normal),  Dominant 3 22 seconds (WNL)    Eddie Hand function Test:  Pt performed functional tasks with non-dominant hand first and then performed with dominant hand following  Writing: Non-dominant 24 18 seconds (WNL), Dominant 10 57  seconds (WNL)  Simulated Page Turning: Non-dominant 6 53 seconds (outside the range of normal), Dominant 6 85 seconds (outside the range of normal)  Lifting Small, Common Objects: Non-Dominant 10 87 seconds (outside the range of normal), Dominant 8 76 seconds (outside the range of normal)  Simulated Feeding: Non-Dominant  7 95 seconds (WNL), Dominant 5 65 seconds (WNL)  Stacking Checkers: Non-Dominant 6 14 seconds (outside the range of normal), Dominant 4 72 seconds (outside the range of normal)  Lifting Large Light Objects: Non-Dominant 4 52  seconds (outside the range of normal), Dominant 3 45seconds (WNL)  Lifting Large, Heavy Objects:Non-Dominant 4 42seconds(outside the range of normal),  Dominant 3 53 seconds (WNL)    Eddie Hand function Test:  Pt performed functional tasks with non-dominant hand first and then performed with dominant hand following     Writing: Non-dominant 29 16 seconds (WNL), Dominant 15 00  seconds (outside the range of normal)  Simulated Page Turning: Non-dominant 5 22 seconds (WNL), Dominant 4 82 seconds (WNL)  Lifting Small, Common Objects: Non-Dominant 7 18 seconds (outside the range of normal), Dominant 5 97 seconds (WNL)  Simulated Feeding: Non-Dominant  9 40 seconds (WNL), Dominant 5 69 seconds (WNL)  Stacking Checkers: Non-Dominant 4 78 seconds (outside the range of normal), Dominant 3 28 seconds (WNL)  Lifting Large Light Objects: Non-Dominant 3 96  seconds (outside the range of normal), Dominant 2 97 seconds (WNL)  Lifting Large, Heavy Objects:Non-Dominant 4 22 seconds(outside the range of normal),  Dominant 3 06 seconds (WNL)    Myofilaments: B/L 3 61; Pt with good abilities to distinguish between hot and cold abilities  B/L AROM:  Shoulder elevation: B/L full   Shoulder FF: B/L full  Shoulder ABD: B/L full with LUE heaviness  ER/IR: B/L full  Elbow ext/flex: B/L full  Sup/Pron: B/L full   Wrist flex/ext: B/L full  Composite: B/L full  Hook: B/L full  Opposition: B/L intact   Finger to nose: B/L intact  Dysdiadochokinesia: B/L intact     MMT: R 5/5, L 3+/5    R 5/5, L 4/5 with hypersensitivities to touch throughout LUE    R 5/5 throughout, L shoulder FF/ext 4/5, elbow flex/ext 5/5, wrist ext/flexion 5/5  Mellette Pipe B/L 5/5      Plan  Plan details: See skilled analysis for further details  Skilled Analysis:  Pt is a 60 y o  female referred to Occupational Therapy s/p CVA  Pt presents with decreased L FMC/prehension with frequent droppage, decreased LUE automaticity, paresthesias throughout LUE with intolerance to various touch textures, decreased LUE strength-- proximal to distal, and LUE inattention affecting Pt's functional use of LUE in daily life tasks and meaningful occupations   Pt will benefit from skilled Occupational Therapy services 2x/week for 8-10 weeks with focus on neuro re-ed, sensory re-ed, self-care management, manual tx, and a HEP to improve on the above deficits, achieve maximal level of independence, improve functional use of LUE, eventual return to worker role, and increase overall QOL  Pt demo with G functional progression towards goals in POC evident by significant improved L FMC/prehension, automaticity, and in-hand manipulation  Pt continues to be most limited by decreased LUE muscle endurance, decreased /pinch strength, heaviness throughout LUE, and hypsensitivities throughout LUE (primarily wrist extensions/flexors) affecting functional use of LUE  OTR recommending Pt to continue skilled OT services 2 x/week for 4 more weeks with focus on descenticization, massed practice for improved muscle endurance, manual tx, FES, and neuro re-edu to improve functional use of LUE and overall QOL       Short Term Goals:  - Pt will tolerate NMES/FES for improved L motor and sensory performance for overall improved hand to target with 75% accuracy 4 weeks--  MET  - Pt will increase prehension patterns for improved tripod with utensil management with <20% droppage 4 weeks-- MET  - Pt will demo with G carryover of Home Exercise Program to improve functional progression towards goals in Plan of care and for improved functional use of LUE x 85% 4 weeks--MET  - Pt will increase automaticity of L UE to 75% for improved grasp release of tabletop items for improved functional performance with salient tasks 4 weeks-- MET  - Pt will increase rate of manipulation for all FM tests x 75% for improved functional performance with salient tasks and meaningful occupations 4 weeks-- MET  - Pt will increase L UE to Mod McMullen with <20% cuing for tabletop tasks for improved functional performance of life roles and salient tasks 4 weeks--  MET  - Pt will demo with G understanding and carryover of desensitization strategies to be performed in home environment for improve tolerance to various touch sensations x 50% 4 weeks--  MET    Long Term Goals:  - Pt will increase automaticity of  L UE to 95% for resumption of B integrative tasks--  MET  - Pt will increase rate of prehension for all FM tasks x 95% for improved use of utensils, writing, ADL fasteners--  MET  - Pt will resume  L hand non-dominance to independent with all activities of daily living, IADLs, and meaningful occupations-- PARTIALLY MET  - Pt will increase L UE strength to 5/5 and  strength L=R, through the use of strengthening exercises and home program for improved overall QOL-- PARTIALLY MET  - Pt will tolerate NMES/FES for improved motor and sensory performance for overall improved hand to target with 95% accuracy-- PARTIALLY MET        -    Pt will demo with G understanding and carryover of desensitization strategies to be performed in home environment for improve tolerance to various touch sensations x 85%--- PARTIALLY MET    TX NOTES:  UBE x 6 minutes prograde/retrograde in seated position bilaterally in seated position with x3 resistance focusing on B/L coordination, improved LUE muscle endurance, and improved proximal strength  MFR/IASTM to lateral bicep, wrist ext/flexors, and hand/digits with focus on sensory re-ed, pain reduction, and tone reduction  Pt with improved tolerance to IASTM on this date with less comfort reported  OTHER PLANNED THERAPY INTERVENTIONS:   Supine, seated, and in stance neuro re-ed  NMES/FES  FMC/prehension  Timed Trials  Manual tx  Sensory re-ed  Desensitization strategies/techniques  Seated functional reach: crossing midline  WBearing strategies  B/L coordination        INTERVENTION COMMENTS:  Diagnosis: CVA  Precautions: N/A  FOTO: 53, with 68% limitation in UE function and 75% limitation in Hand function    60, with 50% limitation in UE function and 50% limitation in Hand function    60, with 50% limitation in Hand function and 55% limitation in UE function  Insurance: Payor: [employer] / Plan: [employer] PLAN [id number] / Product Type: Blue Fee for Service /   7 of 8 visits through 10/16/2019, PN due 10/16/2019

## 2019-10-18 ENCOUNTER — APPOINTMENT (OUTPATIENT)
Dept: OCCUPATIONAL THERAPY | Facility: REHABILITATION | Age: 59
End: 2019-10-18
Payer: COMMERCIAL

## 2019-10-21 ENCOUNTER — OFFICE VISIT (OUTPATIENT)
Dept: OCCUPATIONAL THERAPY | Facility: REHABILITATION | Age: 59
End: 2019-10-21
Payer: COMMERCIAL

## 2019-10-21 ENCOUNTER — OFFICE VISIT (OUTPATIENT)
Dept: PHYSICAL THERAPY | Facility: REHABILITATION | Age: 59
End: 2019-10-21
Payer: COMMERCIAL

## 2019-10-21 DIAGNOSIS — I63.9 CEREBROVASCULAR ACCIDENT (CVA), UNSPECIFIED MECHANISM (HCC): Primary | ICD-10-CM

## 2019-10-21 PROCEDURE — 97112 NEUROMUSCULAR REEDUCATION: CPT | Performed by: OCCUPATIONAL THERAPIST

## 2019-10-21 PROCEDURE — 97112 NEUROMUSCULAR REEDUCATION: CPT

## 2019-10-21 PROCEDURE — 97140 MANUAL THERAPY 1/> REGIONS: CPT | Performed by: OCCUPATIONAL THERAPIST

## 2019-10-21 PROCEDURE — 97110 THERAPEUTIC EXERCISES: CPT

## 2019-10-21 NOTE — PROGRESS NOTES
Daily Note     Today's date: 10/21/2019  Patient name: Uzair Gr  : 1960  MRN: [id number]  Referring provider: Gus Bose DO  Dx:   Encounter Diagnosis     ICD-10-CM    1  Cerebrovascular accident (CVA), unspecified mechanism ([facility] [address] ) I63 9        Start Time: 1000  Stop Time: 1041  Total time in clinic (min): 41 minutes  Visit Tracking:  Insurance: [facility]  Session:   Authorization Start Date: 9/10/2019  Authorization End Date: 2019  Initial Evaluation Completed on: 2019  Re-Evaluation Due: 10/28/2019    Subjective: Pt reports she feels good today      Objective: See treatment diary below    Precautions: Monitor Blood Pressure       Exercise Diary 10/7   10/16 10/21   Endurance Scifit level 2 5 12 minutes Scifit level 2 5 12 minutes Scifit level 3 12 minutes    Lower Extremity Strengthening   Step ups 6"  15 x 2 forwards/ sideway    Lunges  60' x 2   Step ups 6"  15 x 2 forwards/ sideway    Lunges  60' x 2    Static Balance       Dynamic Balance  Outdoor ambulation:  Even/uneven pavement, grass, rocks x12 min    Marching with knee taps: 30ft x 6    Tandem walkinft x 6    Backwards walkinft x 6    Lateral Crossover walkinft x 6  Ambulation with treadmill without use of hands for support initiated  7 mph- 1 2 with  LOB with head turns,able to correct     Marching up incline foam x 6    Backwards on     Forward progression head turns vert/ horizontal 60' x 2     Ambulation with treadmill, 1 finger on TM, HTs 30 sec on/1 min off,  initiated 1 2 mph, x 10 min    Forward progression head turns vert/ horizontal 60' x 2 (1 LOB each)    3 direction toe taps x60 " each                    Assessment: Tolerated treatment well  Informed pt of clinic move to [place]  Pt experiences LOB w/ HT on TM but is able to compensate appropriately, requires slight external support to maintain this balance when turning her head   Pt maintains balance throughout most of exercises, the only difficulty noted today is with head turns  However with this and other balance exercises she consistently uses appropriate balance strategies  Patient exhibited good technique with therapeutic exercises and would benefit from continued PT      Plan: Progress treatment as tolerated  Continue exercises w/ HTs and full body turns

## 2019-10-21 NOTE — PROGRESS NOTES
Daily Note     Today's date: 10/21/2019  Patient name: Caitlyn Bullard  : 1960  MRN: [id number]  Referring provider: Mariama Melo DO  Dx: No diagnosis found  Subjective: "Wow, that was tiring"  Objective: See treatment description below    UBE x 6 minutes prograde/retrograde in seated position bilaterally x 3 resistance level focusing on B/L coordination, increased proximal strength, and increased LUE muscle endurance  MFR/IASTM to L wrist ext/flexors and hand/digits with focus on tone reduction and reduced hypersensitivities  In stance neuro re-ed-- Pt performed 2 sets of alphabet in air with #3 t-bar focusing on LUE proximal strength and muscle endurance  Pt performed composite grasp with x 3 sec isometric hold x 2 sets of 10 reps with hand strengthener, followed by digit extension for improved /pinch/intric strength  K-tape applied with wrist flexors with focus on tone/pain reduction  Assessment: Tolerated treatment well  Patient would benefit from continued OT     Pt demo with Mod fatigue in BUE L>R with UBE demands, though fatigue resolves quickly  Pt with x2 verbal cues to maintain full elbow ext when performing letters in air with #3 t-bar with Max fatigue evident  Pt with self-report of more difficulties with composite  vs  Digit extension requiring increased effort maintaining full composite grasp  Pt continues to report Max benefits for K-tape  Plan: Continue per plan of care  INTERVENTION COMMENTS:  Diagnosis: CVA  Precautions: N/A  FOTO: 48, with 68% limitation in UE function and 75% limitation in Hand function    60, with 50% limitation in UE function and 50% limitation in Hand function    60, with 50% limitation in Hand function and 55% limitation in UE function  Insurance: Payor: [employer] / Plan: [employer] PLAN 361 / Product Type: Blue Fee for Service /   1 of 8 visits through 2019, PN due 11/16/2019

## 2019-11-04 ENCOUNTER — OFFICE VISIT (OUTPATIENT)
Dept: OCCUPATIONAL THERAPY | Facility: REHABILITATION | Age: 59
End: 2019-11-04
Payer: COMMERCIAL

## 2019-11-04 ENCOUNTER — OFFICE VISIT (OUTPATIENT)
Dept: PHYSICAL THERAPY | Facility: REHABILITATION | Age: 59
End: 2019-11-04
Payer: COMMERCIAL

## 2019-11-04 DIAGNOSIS — I63.9 CEREBROVASCULAR ACCIDENT (CVA), UNSPECIFIED MECHANISM (HCC): Primary | ICD-10-CM

## 2019-11-04 PROCEDURE — 97112 NEUROMUSCULAR REEDUCATION: CPT

## 2019-11-04 PROCEDURE — 97112 NEUROMUSCULAR REEDUCATION: CPT | Performed by: OCCUPATIONAL THERAPIST

## 2019-11-04 PROCEDURE — 97110 THERAPEUTIC EXERCISES: CPT

## 2019-11-04 PROCEDURE — 97140 MANUAL THERAPY 1/> REGIONS: CPT | Performed by: OCCUPATIONAL THERAPIST

## 2019-11-04 NOTE — PROGRESS NOTES
Daily Note     Today's date: 2019  Patient name: Raoul Wilkinson  : 1960  MRN: [id number]  Referring provider: Isaura Burks DO  Dx:   No diagnosis found  Visit Tracking:  Insurance: [facility]  Session:   Authorization Start Date: 9/10/2019  Authorization End Date: 2019  Initial Evaluation Completed on: 2019  Re-Evaluation Due: 10/28/2019    Subjective: Pt reports she feels good today      Objective: See treatment diary below    Precautions: Monitor Blood Pressure       Exercise Diary 10/7   10/16 10/21 11/4   Endurance Scifit level 2 5 12 minutes Scifit level 2 5 12 minutes Scifit level 3 12 minutes     Lower Extremity Strengthening   Step ups 6"  15 x 2 forwards/ sideway    Lunges  60' x 2   Step ups 6"  15 x 2 forwards/ sideway    Lunges  60' x 2     Static Balance        Dynamic Balance  Outdoor ambulation:  Even/uneven pavement, grass, rocks x12 min    Marching with knee taps: 30ft x 6    Tandem walkinft x 6    Backwards walkinft x 6    Lateral Crossover walkinft x 6  Ambulation with treadmill without use of hands for support initiated  7 mph- 1 2 with  LOB with head turns,able to correct     Marching up incline foam x 6    Backwards on     Forward progression head turns vert/ horizontal 60' x 2     Ambulation with treadmill, 1 finger on TM, HTs 30 sec on/1 min off,  initiated 1 2 mph, x 10 min    Forward progression head turns vert/ horizontal 60' x 2 (1 LOB each)    3 direction toe taps x60 " each                      Assessment: Tolerated treatment well  Informed pt of clinic move to [place]  Pt experiences LOB w/ HT on TM but is able to compensate appropriately, requires slight external support to maintain this balance when turning her head  Pt maintains balance throughout most of exercises, the only difficulty noted today is with head turns  However with this and other balance exercises she consistently uses appropriate balance strategies   Patient exhibited good technique with therapeutic exercises and would benefit from continued PT      Plan: Progress treatment as tolerated  Continue exercises w/ HTs and full body turns

## 2019-11-04 NOTE — PROGRESS NOTES
PT Re-Evaluation     Today's date: 2019  Patient name: Suyapa Pettit  : 1960  MRN: [id number]  Referring provider: Evy Daniel DO  Dx:   Encounter Diagnosis     ICD-10-CM    1  Cerebrovascular accident (CVA), unspecified mechanism ([facility] [address] ) I63 9        Start Time: 1045     Visit Tracking:  Insurance: [facility]  Session: 10/12  Authorization Start Date: 9/10/2019  Authorization End Date: 2019  Initial Evaluation Completed on: 2019  Re-Evaluation Due: 2019    Subjective: Pt reports she feels a lot better since starting outpatient physical therapy noticing improved balance, endurance, and strength  Her daughter reports she noticed her her mother was walking with a "limp" on the left side especially when dancing  Ailin Conte reports slight stiffness behind her left knee but reports no pain, no swelling, no redness, or abnormal sensation in her left leg  Objective: See treatment diary below  Precautions: Monitor Blood Pressure  Rest Blood Pressure 19: 135/87 mmHg, 61 bpm automatic cuff  Post-exercise Blood Pressure 19: 140/89 mmHg, 85 bpm automatic cuff      Manual Muscle Testing - Hip Right Left   Flexion 5 5   Abduction 5 5   Adduction 5 5     Manual Muscle Testing - Knee Right Left   Flexion 5 5   Extension 5 5     Manual Muscle Testing - Ankle Right Left   Doriflexion 5 5   Plantarflexion 5 5     Lower extremity Range of Motion    Knee Flexion R: 120* L: 110*   Knee Extension R: 5* L5*       Gait: Patient ambulates with a step through pattern with bilateral reciprocal arm swing, however when fatigued she demonstrates a toe catch on her left side versus her right       Outcome Measures 7/11 9/10 11/4   6 Minute Walk Test (ft): 1,060 feet, numbness in left arm and 5/10 lightheadedness     Post /88mmHg 1,262 feet    Post BP: 138/86 mmHg, 82 bpm 1,440 feet   10MWT (s):  15 48 seconds with assistive device     14 93 seconds without assistive device 10 53 seconds without assistive device,  95 meter/second 10 38 seconds without an assistive device   5x Sit To Stand (s):  21 94 seconds, 17" chair with arms across chest 16 21 seconds, 17" chair with arms across chest 15 00 seconds, 17" chair with arms across chest   TUG (s) 22 54 seconds with rolling walker    14 43 seconds without assistive device  10 67 seconds without assistive device 9 91 seconds without an assistive device   FGA  23/30 25/30     Functional Gait Assessment  3/3 Gait level surface  3/3 Change in gait speed  2/3 Gait with horizontal head turns  3/3 Gait with vertical head turns  3/3 Gait and pivot turn  3/3 Step over obstacle  1/3 Gait with narrow base of support  2/3 Gait with eyes closed  2/3 Ambulating backwards  3/3 Steps  25/30 Total score (less than 22/30 indicates increased risk of fall)         Functional Gait Assessment (9/10/2019)  3/3 Gait level surface  3/3 Change in gait speed  2/3 Gait with horizontal head turns  2/3 Gait with vertical head turns  2/3 Gait and pivot turn  2/3 Step over obstacle  2/3 Gait with narrow base of support  2/3 Gait with eyes closed  2/3 Ambulating backwards  3/3 Steps  23/30 Total score (less than 22/30 indicates increased risk of fall)       Exercise Diary   10/16 10/21 11/4   Endurance Scifit level 2 5 12 minutes Scifit level 3 12 minutes 6 minute walk test  Scifit Level 3 10 minutes    Lower Extremity Strengthening  Step ups 6"  15 x 2 forwards/ sideway    Lunges  60' x 2   Step ups 6"  15 x 2 forwards/ sideway    Lunges  60' x 2 5x sit to stand    Static Balance       Dynamic Balance   Ambulation with treadmill without use of hands for support initiated  7 mph- 1 2 with  LOB with head turns,able to correct     Marching up incline foam x 6    Backwards on     Forward progression head turns vert/ horizontal 60' x 2     Ambulation with treadmill, 1 finger on TM, HTs 30 sec on/1 min off,  initiated 1 2 mph, x 10 min    Forward progression head turns vert/ horizontal 60' x 2 (1 LOB each)    3 direction toe taps x60 " each TUG  FGA  10MWT                ASSESSMENT  Patient presents to outpatient physical therapy with sx consistent with CVA  Patient's impairments include pain, decrease coordination, decrease functional mobility, decrease functional endurance, decrease functional strength, decrease dynamic/static balance, and impaired gait mechanics  Since starting outpatient physical therapy the patient has progressed/met with goals, has made subjective report of improvement, and has demonstrated improvement on mobility measures, and her FGA score  Since her last re-evaluation Tracey Scott continues to demonstrate improvement on mobility and FGA score but she is still outside her age norms with the FGA  Batsheva's daughter reports she notice Batsheva "limping" after dancing and Tracey Scott reports slight increase in her stiffness behind her left knee  She did not demonstrate any gait deviations during her re-evaluation but towards the end of the 6 minute walk test she had decrease toe clearance on left  I believe this decrease toe clearance is related more to fatigue versus sudden weakness as Batsheva did not lose her balance or report any buckling while walking  Range of motion measurements were completed of bilateral knee with Batsheva demonstrate slight decrease range of motion on the left versus the right  Patient would benefit from continued outpatient physical therapy to continue to address impairments  Anticipate the patient will be discharged after next re-evaluation pending her progress  STG's:   - Patient will be independent with home exercise program in 2 weeks  - MET  - Patient will complete the TUG in no more than 10 seconds to demonstrate improved functional mobility  - MET  -Patient will complete the 5x sit to  no more than 15 seconds to demonstrate improved functional mobility and functional strength  - MET  - Patient will demonstrate an improvement in gait speed by  1m/sec to demonstrate improved functional mobility  -  MET    LTG's:  -Patient will complete the 5x sit to  no more than 11 seconds to demonstrate improved functional mobility and functional strength  - Progress, NOT MET  - Patient will demonstrate an improvement in gait speed by  2m/sec to demonstrate improved functional mobility  - MET  - Patient will complete 6 minute walk test demonstrating no more than slight gait deviations to demonstrate improved functional mobility and improved gait mechanics - MET  - Patient will score at least a 26/30 on the FGA to demonstrate improve dynamic balance and decrease her risk of experiencing a fall at time of discharge  - Progressing, NOT MET    PLAN OF CARE  Patient will benefit from skilled outpatient physical therapy 1-2 x/wk for 8 weeks  Therapeutic interventions to include therapeutic activity, therapeutic exercise, neuromuscular re-education  as listed below in the grids     Stop Time: 1125  Total time in clinic (min): 40 minutes

## 2019-11-04 NOTE — PROGRESS NOTES
Daily Note     Today's date: 2019  Patient name: Kate Norton  : 1960  MRN: [id number]  Referring provider: Dewayne Howard,   Dx: No diagnosis found  Subjective: "That was really hard for me today"  Objective: See treatment description below    UBE x 6 minutes prograde/retrograde in seated position bilaterally with x 3 resistance level for 2 minutes and x2 resistance level for remainder of time focusing on improved sustained grasp, LUE strength/muscle endurance, and B/L coordination  IASTM to L wrist ext/flex and hand/digits with focus on sensory re-ed, stiffness reduction, and improved L motor function  K -tape donned to wrist flex/extensors  In stance functional reach crossing midline for cone retrieval and placement on OH shelf, advanced to block retrieval with reach with #1 wrist weight donned and placement on top of cone focusing on OH reach, improve LUE muscle endurance, and hand to target accuracy  Seated functional reach crossing midline for rubber band retrieval with tying knots to metal fermin focusing on sustained OH reach, B/L coordination, and LUE muscle endurance  In stance t-bar therband turn with sustained hold in at 90* with wrist ext/flexion focusing on improved LUE muscle endurance/strength and B/L coordination  Pt performed x 2 sets  Theraband knot tie/untie with focus on B/L coordination and intrinsic strength  Assessment: Tolerated treatment well  Patient would benefit from continued OT     Pt with Max fatigue reported in LUE with UBE demands on this date with rest break required prior to transitioning to new activity  Pt continues to present with positive response with manual tx with improved tolerance evident  Pt with less heaviness reported in LUE with OH reach demands with emerging improved muscle endurance    Pt with minimal difficulties with grasp/release automaticity requiring increased time required to sustained grasp successfully  Pt with self-report of increased pain in L digits 8/10 with tying knot demands and mild fatigue in LUE with sustained OH reach demands  Pt with Max fatigue in L wrist extensors with frequent rest breaks for task completion  Plan: Continue per plan of care  INTERVENTION COMMENTS:  Diagnosis: CVA  Precautions: N/A  FOTO: 48, with 68% limitation in UE function and 75% limitation in Hand function    60, with 50% limitation in UE function and 50% limitation in Hand function    60, with 50% limitation in Hand function and 55% limitation in UE function  Insurance: Payor: Alexander Hathaway / Plan: [employer] PLAN 361 / Product Type: Blue Fee for Service /   2 of 8 visits through 11/15/2019, PN due 11/16/2019

## 2019-11-06 ENCOUNTER — APPOINTMENT (OUTPATIENT)
Dept: PHYSICAL THERAPY | Facility: REHABILITATION | Age: 59
End: 2019-11-06
Payer: COMMERCIAL

## 2019-11-06 ENCOUNTER — APPOINTMENT (OUTPATIENT)
Dept: OCCUPATIONAL THERAPY | Facility: REHABILITATION | Age: 59
End: 2019-11-06
Payer: COMMERCIAL

## 2019-11-07 ENCOUNTER — OFFICE VISIT (OUTPATIENT)
Dept: OCCUPATIONAL THERAPY | Facility: REHABILITATION | Age: 59
End: 2019-11-07
Payer: COMMERCIAL

## 2019-11-07 ENCOUNTER — OFFICE VISIT (OUTPATIENT)
Dept: PHYSICAL THERAPY | Facility: REHABILITATION | Age: 59
End: 2019-11-07
Payer: COMMERCIAL

## 2019-11-07 DIAGNOSIS — I63.9 CEREBROVASCULAR ACCIDENT (CVA), UNSPECIFIED MECHANISM (HCC): Primary | ICD-10-CM

## 2019-11-07 PROCEDURE — 97112 NEUROMUSCULAR REEDUCATION: CPT | Performed by: OCCUPATIONAL THERAPIST

## 2019-11-07 PROCEDURE — 97140 MANUAL THERAPY 1/> REGIONS: CPT | Performed by: OCCUPATIONAL THERAPIST

## 2019-11-07 PROCEDURE — 97112 NEUROMUSCULAR REEDUCATION: CPT

## 2019-11-07 PROCEDURE — 97110 THERAPEUTIC EXERCISES: CPT

## 2019-11-07 NOTE — PROGRESS NOTES
Daily Note     Today's date: 2019  Patient name: Stanford Grey  : 1960  MRN: [id number]  Referring provider: Liya Lane DO  Dx:   Encounter Diagnosis     ICD-10-CM    1  Cerebrovascular accident (CVA), unspecified mechanism ([facility] [place] [address] ) I63 9        Start Time: 0920  Stop Time: 1000  Total time in clinic (min): 40 minutes    Subjective: Pt reports she is doing well today      Objective: See treatment diary below    Exercise Diary   10/16 10/21 11/4 11/7   Endurance Scifit level 2 5 12 minutes Scifit level 3 12 minutes 6 minute walk test  Scifit Level 3 10 minutes Scifit Level 3 10 minutes    Lower Extremity Strengthening  Step ups 6"  15 x 2 forwards/ sideway    Lunges  60' x 2   Step ups 6"  15 x 2 forwards/ sideway    Lunges  60' x 2 5x sit to stand Sit to stand from middle of foam steps  10x2, focus on improved anterior weight shifting    Static Balance        Dynamic Balance   Ambulation with treadmill without use of hands for support initiated  7 mph- 1 2 with  LOB with head turns,able to correct     Marching up incline foam x 6    Backwards on     Forward progression head turns vert/ horizontal 60' x 2     Ambulation with treadmill, 1 finger on TM, HTs 30 sec on/1 min off,  initiated 1 2 mph, x 10 min    Forward progression head turns vert/ horizontal 60' x 2 (1 LOB each)    3 direction toe taps x60 " each TUG  FGA  10MWT Walking w/ HTs, HNs x 5 min  Tandem walking on firm x2 min  Tandem on foam beam x2 min  EC x2 min  Foam ball toss x2 min  Foam cross body reach to cones x5 min  Cone toe tap step overs x4 min                 Assessment: Tolerated treatment well  Minimal balance errors observed throughout all balance exercises  She does require stepping strategy when walking tandem on foam  Pt fatigues when completing STS from foam steps but she is able to do this w/out use of UEs  Pt appears to be meeting all of her established PT goals, recommend assessment from PT for potential discharge  Patient demonstrated fatigue post treatment, exhibited good technique with therapeutic exercises and would benefit from continued PT      Plan: Continue per plan of care  INTERVENTION COMMENTS:  Diagnosis: CVA  Precautions: N/A  FOTO: 48, with 68% limitation in UE function and 75% limitation in Hand function    60, with 50% limitation in UE function and 50% limitation in Hand function    60, with 50% limitation in Hand function and 55% limitation in UE function  Insurance: Payor: Bobby Peters / Plan: [facility] PLAN 361 / Product Type: Blue Fee for Service /   2 of 8 visits through 11/ 15/2019, PN due 11/16/2019

## 2019-11-07 NOTE — PROGRESS NOTES
Daily Note     Today's date: 2019  Patient name: Ellen Barker  : 1960  MRN: [id number]  Referring provider: Zeferino Ruiz DO  Dx: No diagnosis found  Subjective: "I don't know if it's because of the weather, but a lot of pain"  Objective: See treatment description below    Pain 8/10 in LUE upon arrival      UBE x 6 minutes prograde/retrograde in seated position bilaterally with x2 resistance level with focus on sustained grasp, B/L coordination, and LUE proximal strength/muscle endurance  IASTM to L delt, wrist ext/flexors, and hand/digits, followed by K-tape applied to ulnar side of hand/forearm, elbow, and delt for pain reduction and sensory re-ed  Seated functional reach crossing midline for marble retrieval utilizing resistive clothes pin and placement to various targets with focus on intrinsic strength, FMC/prehension, and hand to target abilities  Assessment: Tolerated treatment well  Patient would benefit from continued OT     Pt continues to present positively to manual tx with pain reduced to 6/10  Pt with improved intrinsic strength and FMC/prehension abilities with decreased droppage evident this session-- Pt with 5% droppage noted towards end of functional task 2* fatigue in L hand  Pt with significant improved L proprioception and hand to target accuracy  Plan: Continue per plan of care  INTERVENTION COMMENTS:  Diagnosis: CVA  Precautions: N/A  FOTO: 48, with 68% limitation in UE function and 75% limitation in Hand function    60, with 50% limitation in UE function and 50% limitation in Hand function    60, with 50% limitation in Hand function and 55% limitation in UE function  Insurance: Payor: Tavon Turcios / Plan: [employer] PLAN 361 / Product Type: Blue Fee for Service /   3 of 8 visits through 11/ 15/2019, PN due 2019

## 2019-11-11 ENCOUNTER — OFFICE VISIT (OUTPATIENT)
Dept: PHYSICAL THERAPY | Facility: REHABILITATION | Age: 59
End: 2019-11-11
Payer: COMMERCIAL

## 2019-11-11 ENCOUNTER — OFFICE VISIT (OUTPATIENT)
Dept: OCCUPATIONAL THERAPY | Facility: REHABILITATION | Age: 59
End: 2019-11-11
Payer: COMMERCIAL

## 2019-11-11 DIAGNOSIS — I63.9 CEREBROVASCULAR ACCIDENT (CVA), UNSPECIFIED MECHANISM (HCC): Primary | ICD-10-CM

## 2019-11-11 PROCEDURE — 97112 NEUROMUSCULAR REEDUCATION: CPT

## 2019-11-11 PROCEDURE — 97112 NEUROMUSCULAR REEDUCATION: CPT | Performed by: OCCUPATIONAL THERAPIST

## 2019-11-11 PROCEDURE — 97110 THERAPEUTIC EXERCISES: CPT

## 2019-11-11 PROCEDURE — 97140 MANUAL THERAPY 1/> REGIONS: CPT | Performed by: OCCUPATIONAL THERAPIST

## 2019-11-11 NOTE — PROGRESS NOTES
Daily Note     Today's date: 2019  Patient name: Jennifer Powell  : 1960  MRN: [id number]  Referring provider: Lacy Medrano DO  Dx: No diagnosis found  Subjective: "No pain now"  Objective: See treatment description below    Pain 7/10 in LUE upon arrival     UBE x 6 minutes prograde with focus on posterior strengthening and muscle endurance  Supine neuro re-ed-- Pt performed 3 sets x 10 reps of shoulder FF with x3 isometric pulses and retrograde circumduction with focus on proximal strength and LUE muscle endurance  IASTM to wrist flexors/extensors with focus on sensory re-ed, tone reduction, and improved motor function  Velcro board with focus on /pinch/intrinsic strength, sensory re-ed for improved tolerance to various textures, and sup/pronation  Pt performed 1 set x 5 reps of each exercise  Assessment: Tolerated treatment well  Patient would benefit from continued OT     Pt continues to present with hypersensitivities to various textures and tactile cues with emerging improved tolerance evident  Pt with Max difficulties with turning roller on velcro board with sup/pron demands with frequent rest breaks required  Pt with self-report of significant pain/hypersensitivity reduction with K-tape donned  Pain resolved to 0/10 after manual tx and K-tape donned  Plan: Continue per plan of care  INTERVENTION COMMENTS:  Diagnosis: CVA  Precautions: N/A  FOTO: 48, with 68% limitation in UE function and 75% limitation in Hand function    60, with 50% limitation in UE function and 50% limitation in Hand function    60, with 50% limitation in Hand function and 55% limitation in UE function  Insurance: Payor: [employer]OSS / Plan: [facility] PLAN 361 / Product Type: Blue Fee for Service /   4 of 8 visits through 11/15/2019, PN due 2019

## 2019-11-11 NOTE — PROGRESS NOTES
Daily Note     Today's date: 2019  Patient name: Flori Chery  : 1960  MRN: [id number]  Referring provider: Charanjit Arciniega DO  Dx:   Encounter Diagnosis     ICD-10-CM    1  Cerebrovascular accident (CVA), unspecified mechanism ([facility] [address] ) I63 9        Start Time: 09  Stop Time: 1034  Total time in clinic (min): 41 minutes    Subjective: Pt reports she is doing well today      Objective: See treatment diary below    Exercise Diary   10/16 10/21 11/4 11/7 11/11   Endurance Scifit level 2 5 12 minutes Scifit level 3 12 minutes 6 minute walk test  Scifit Level 3 10 minutes Scifit Level 3 10 minutes TM 1 5 mph, incline 2 0 x10 min (7/10 dizziness reported after stopping TM)    Lower Extremity Strengthening  Step ups 6"  15 x 2 forwards/ sideway    Lunges  60' x 2   Step ups 6"  15 x 2 forwards/ sideway    Lunges  60' x 2 5x sit to stand Sit to stand from middle of foam steps  10x2, focus on improved anterior weight shifting 30 sec STS x3:  Trial 1: x11  Trial 2: x9  Trial 3: x9    Static Balance     Foam:  FTEC x1 min  HTs x10, HNs x10  Toe taps to cones x20    Dynamic Balance   Ambulation with treadmill without use of hands for support initiated  7 mph- 1 2 with  LOB with head turns,able to correct     Marching up incline foam x 6    Backwards on     Forward progression head turns vert/ horizontal 60' x 2     Ambulation with treadmill, 1 finger on TM, HTs 30 sec on/1 min off,  initiated 1 2 mph, x 10 min    Forward progression head turns vert/ horizontal 60' x 2 (1 LOB each)    3 direction toe taps x60 " each TUG  FGA  10MWT Walking w/ HTs, HNs x 5 min  Tandem walking on firm x2 min  Tandem on foam beam x2 min  EC x2 min  Foam ball toss x2 min  Foam cross body reach to cones x5 min  Cone toe tap step overs x4 min Ambulation fwd/backward in clinic w/ cognitive challenge x 6 min  Balance beam fwd and lateral x40 ft each (repeated LOB w/ lateral stepping)                     Assessment: Tolerated treatment well  Pt reported dizziness after TM, requiring seated rest which returned symptoms to baseline  Pt maintains speed walking fwd but some lateral instability noted w/ backwards walking when pt turns head to see behind her  Pt maintains balance w/ all other foam balance exercises, primarily using ankle strategy to maintain balance  Patient demonstrated fatigue post treatment, exhibited good technique with therapeutic exercises and would benefit from continued PT      Plan: Continue per plan of care  On Wens continue scheduling pt forward, coordinating w/ OT schedule, 1 day a week, per POC  Increase incline on TM  INTERVENTION COMMENTS:  Diagnosis: CVA  Precautions: N/A  FOTO: 48, with 68% limitation in UE function and 75% limitation in Hand function    60, with 50% limitation in UE function and 50% limitation in Hand function    60, with 50% limitation in Hand function and 55% limitation in UE function  Insurance: Payor: Arianna Short / Plan: [employer] PLAN 361 / Product Type: Blue Fee for Service /   2 of 8 visits through 11/ 15/2019, PN due 11/16/2019

## 2019-11-12 ENCOUNTER — TELEPHONE (OUTPATIENT)
Dept: NEUROLOGY | Facility: CLINIC | Age: 59
End: 2019-11-12

## 2019-11-12 NOTE — TELEPHONE ENCOUNTER
Patient came in to drop off Physicians Certification and Medical Clearance Request for Oral Surgery Forms to be filled out and signed by Dr Donovan Goel  When completed please call patient daughter Makenna Rene  Please fax Physicians Certification form to [phone] and Medical Clearance form to [phone]  Forms scan to chart

## 2019-11-13 ENCOUNTER — EVALUATION (OUTPATIENT)
Dept: OCCUPATIONAL THERAPY | Facility: REHABILITATION | Age: 59
End: 2019-11-13
Payer: COMMERCIAL

## 2019-11-13 ENCOUNTER — OFFICE VISIT (OUTPATIENT)
Dept: PHYSICAL THERAPY | Facility: REHABILITATION | Age: 59
End: 2019-11-13
Payer: COMMERCIAL

## 2019-11-13 DIAGNOSIS — R20.2 PARESTHESIA: Primary | ICD-10-CM

## 2019-11-13 DIAGNOSIS — I63.9 CEREBROVASCULAR ACCIDENT (CVA), UNSPECIFIED MECHANISM (HCC): Primary | ICD-10-CM

## 2019-11-13 PROCEDURE — 97112 NEUROMUSCULAR REEDUCATION: CPT

## 2019-11-13 PROCEDURE — 97535 SELF CARE MNGMENT TRAINING: CPT | Performed by: OCCUPATIONAL THERAPIST

## 2019-11-13 PROCEDURE — 97112 NEUROMUSCULAR REEDUCATION: CPT | Performed by: OCCUPATIONAL THERAPIST

## 2019-11-13 NOTE — TELEPHONE ENCOUNTER
Please review forms in media  Physician certification form for admission into nursing facility and [facility]  For medical clearance for tooth extraction with sedation  Please advise if you are agreeable to complete form and if patient is cleared for oral surgery

## 2019-11-13 NOTE — PROGRESS NOTES
OCCUPATIONAL THERAPY RE-EVALUATION:    11/13/2019  Mariaaarin Sanchez  1960  [id number]  Rut Hernández, DO  No diagnosis found  Subjective Evaluation    Quality of life: fair        Pt's Dtr reports Pt feeling as though her hand is numb and altered sensation throughout LUE  PATIENT GOAL: "Improve my sensation and use my LUE better"  HISTORY OF PRESENT ILLNESS:     Pt is a 60 y o  female who was referred to Occupational Therapy s/p CVA  As per hospital reports, Pt originally presented to the hospital on 6/29/2019 due to right thalamic lacunar CVA due to small vessel disease in which she is s/p TPA  Pt underwent various imaging with results identifying the following: MRI Brain W/o Contrast-- Several mm acute right thalamic lacunar infarction Mild chronic microangiopathy, CT Scan--No acute intracranial hemorrhage or mass effect,  CTA--No evidence of significant stenosis, dissection or occlusion involving cervical carotid or vertebral segments or cerebral arteries  Pt was evaluated by neurology and did have an echo  Pt did not participate in any therapy services in hospital setting  Pt was D/Carlos on 07/02/2019 to home environment  Pt with self report of paresthesias throughout LUE, decreased FMC/prehension with frequent droppage, inattention to LUE resulting in decreased functional use of LUE  Pt lives in an apartment on the seconds floor with daughter, granddaughter, and son in law--apartment has 10 steps for entry with railings on both sides for increased safety awareness  Pt performing ADLs at S status at this time for improved safety  Pts Dtr currently performing all IADLs at this time  Pt currently returning to folding laundry as tolerated  Pt with loss of worker role--Pt worked as a  at [employer]  Pt with desires to return to worker role, if able  Pt has no history of  role         PMH:   Past Medical History:   Diagnosis Date    Gastritis          Pain Levels: Restin    With Activity:  8    Pain reported in LUE  Objective     Functional Assessment        Comments  See impairment section for further details      Impairment Observations:  1  Decreased L FMC/prehension with frequent droppage  2  Decreased automaticity of LUE  3  Paresthesias throughout LUE, intolerant to various touch textures  4  LUE inattention  5  Decreased LUE strength-- proximal to distal    Dynamometer Position #2:   R: 58    44  46  55  56  L: 35    26  31  39  32    R hand dominant  Action:  3 point pinch: R  7 and L 5  R 7, L 6  R 9 5, L 6  R 10 5, L 9  R 9, L 8  2 point pinch: R 5 and L 4 5    R 5, L 5  R 5, L 5  R 7, L 5  R 6, L 5  Lateral pinch: R 10 5 and L 8    R 11, L 8  R 13, L 8  R 13, L 10 5  Patsi Reil Patsi Reil R 12 5, L 8    9-hole Peg Test: RUE: 20 62 seconds, LUE: 24 30 seconds    19 24 seconds, L 20 44 seconds    R 19 16 seconds, L 20 30 seconds    R 19 69 seconds, L 18 57 seconds    R 19 06 seconds, L 18 78 seconds with no droppage    Eddie Hand function Test:  Pt performed functional tasks with non-dominant hand (L)  first and then performed with dominant hand (R) following     Writing: Non-dominant  35 99 seconds (outside normal range), Dominant 11 19 seconds (WNL)  Simulated Page Turning: Non-dominant 10 53 seconds (outside normal range), Dominant 4 40 seconds (WNL)  Lifting Small, Common Objects: Non-Dominant 9 20 seconds (outside normal range), Dominant 6 66 seconds (outside normal range)  Simulated Feeding: Non-Dominant 13 68 seconds (outside normal range), Dominant 5 63 seconds (WNL)  Stacking Checkers: Non-Dominant 5 03 seconds (outside normal range), Dominant 1 69 seconds (WNL)  Lifting Large Light Objects: Non-Dominant 5 16 seconds (outside normal range), Dominant 3 79 seconds (WNL)  Lifting Large, Heavy Objects:Non-Dominant 6 09 Seconds (outside normal range),  Dominant 3 69 seconds (WNL)    Eddie Hand function Test:  Pt performed functional tasks with non-dominant hand first and then performed with dominant hand following  Writing: Non-dominant 41 05 seconds (outside the range of normal), Dominant 14 40  seconds (outside the range of normal)  Simulated Page Turning: Non-dominant 6 72 seconds (outside the range of normal), Dominant 3 42 seconds (WNL)  Lifting Small, Common Objects: Non-Dominant 8 40 seconds (outside the range of normal), Dominant 5 27 seconds (WNL)  Simulated Feeding: Non-Dominant 11 32 seconds (outside the range of normal), Dominant 7 62 seconds (WNL)  Stacking Checkers: Non-Dominant 4 83 seconds (outside the range of normal), Dominant 2 93 seconds (WNL)  Lifting Large Light Objects: Non-Dominant 4 90  seconds (outside the range of normal), Dominant 3 10 seconds (WNL)  Lifting Large, Heavy Objects:Non-Dominant 6 12  seconds(outside the range of normal),  Dominant 3 22 seconds (WNL)    Eddie Hand function Test:  Pt performed functional tasks with non-dominant hand first and then performed with dominant hand following  Writing: Non-dominant 24 18 seconds (WNL), Dominant 10 57  seconds (WNL)  Simulated Page Turning: Non-dominant 6 53 seconds (outside the range of normal), Dominant 6 85 seconds (outside the range of normal)  Lifting Small, Common Objects: Non-Dominant 10 87 seconds (outside the range of normal), Dominant 8 76 seconds (outside the range of normal)  Simulated Feeding: Non-Dominant  7 95 seconds (WNL), Dominant 5 65 seconds (WNL)  Stacking Checkers: Non-Dominant 6 14 seconds (outside the range of normal), Dominant 4 72 seconds (outside the range of normal)  Lifting Large Light Objects: Non-Dominant 4 52  seconds (outside the range of normal), Dominant 3 45seconds (WNL)  Lifting Large, Heavy Objects:Non-Dominant 4 42seconds(outside the range of normal),  Dominant 3 53 seconds (WNL)    Eddie Hand function Test:  Pt performed functional tasks with non-dominant hand first and then performed with dominant hand following  Writing: Non-dominant 29 16 seconds (WNL), Dominant 15 00  seconds (outside the range of normal)  Simulated Page Turning: Non-dominant 5 22 seconds (WNL), Dominant 4 82 seconds (WNL)  Lifting Small, Common Objects: Non-Dominant 7 18 seconds (outside the range of normal), Dominant 5 97 seconds (WNL)  Simulated Feeding: Non-Dominant  9 40 seconds (WNL), Dominant 5 69 seconds (WNL)  Stacking Checkers: Non-Dominant 4 78 seconds (outside the range of normal), Dominant 3 28 seconds (WNL)  Lifting Large Light Objects: Non-Dominant 3 96  seconds (outside the range of normal), Dominant 2 97 seconds (WNL)  Lifting Large, Heavy Objects:Non-Dominant 4 22 seconds(outside the range of normal),  Dominant 3 06 seconds (WNL)    Eddie Hand function Test:  Pt performed functional tasks with non-dominant hand first and then performed with dominant hand following  Writing: Non-dominant 25 91 seconds (WNL), Dominant 8 63  seconds (WNL)  Simulated Page Turning: Non-dominant 6 66 seconds (outside the range of normal), Dominant 3 87 seconds (WNL)  Lifting Small, Common Objects: Non-Dominant 8 10 seconds (outside the range of normal), Dominant 5 62 seconds (WNL)  Simulated Feeding: Non-Dominant 7 03 seconds (WNL), Dominant 5 56 seconds (WNL)  Stacking Checkers: Non-Dominant 4 65 seconds (outside the range of normal), Dominant 3 13 seconds (WNL)  Lifting Large Light Objects: Non-Dominant 5 03  seconds (outside the range of normal), Dominant 3 03 seconds (WNL)  Lifting Large, Heavy Objects:Non-Dominant 5 25  Seconds(outside the range of normal),  Dominant 3 28 seconds (WNL)    Myofilaments: B/L 3 61; Pt with good abilities to distinguish between hot and cold abilities      B/L AROM:  Shoulder elevation: B/L full   Shoulder FF: B/L full  Shoulder ABD: B/L full with LUE heaviness  ER/IR: B/L full  Elbow ext/flex: B/L full  Sup/Pron: B/L full   Wrist flex/ext: B/L full  Composite: B/L full  Hook: B/L full  Opposition: B/L intact   Finger to nose: B/L intact  Dysdiadochokinesia: B/L intact     MMT: R 5/5, L 3+/5    R 5/5, L 4/5 with hypersensitivities to touch throughout LUE    R 5/5 throughout, L shoulder FF/ext 4/5, elbow flex/ext 5/5, wrist ext/flexion 5/5  Lotus Cordero B/L 5/5      Plan  Plan details: See skilled analysis for further details  Skilled Analysis:  Pt is a 60 y o  female referred to Occupational Therapy s/p CVA  Pt presents with decreased L FMC/prehension with frequent droppage, decreased LUE automaticity, paresthesias throughout LUE with intolerance to various touch textures, decreased LUE strength-- proximal to distal, and LUE inattention affecting Pt's functional use of LUE in daily life tasks and meaningful occupations  Pt will benefit from skilled Occupational Therapy services 2x/week for 8-10 weeks with focus on neuro re-ed, sensory re-ed, self-care management, manual tx, and a HEP to improve on the above deficits, achieve maximal level of independence, improve functional use of LUE, eventual return to worker role, and increase overall QOL  Pt demo with G functional progression towards goals in POC evident by significant improved L FMC/prehension, automaticity, and in-hand manipulation  Pt continues to be most limited by decreased LUE muscle endurance, heaviness throughout LUE, and pain/hypsensitivities throughout LUE (primarily on ulnar side of UE/hand) affecting functional use of LUE  OTR will be placing Pt on a 30-day hold-- OTR in communication with neurologist for possible EMG to further assess ongoing pain/altered sensation on ulnar side of LUE/hand        Short Term Goals:  - Pt will tolerate NMES/FES for improved L motor and sensory performance for overall improved hand to target with 75% accuracy 4 weeks--  MET  - Pt will increase prehension patterns for improved tripod with utensil management with <20% droppage 4 weeks-- MET  - Pt will demo with G carryover of Home Exercise Program to improve functional progression towards goals in Plan of care and for improved functional use of LUE x 85% 4 weeks--MET  - Pt will increase automaticity of L UE to 75% for improved grasp release of tabletop items for improved functional performance with salient tasks 4 weeks-- MET  - Pt will increase rate of manipulation for all FM tests x 75% for improved functional performance with salient tasks and meaningful occupations 4 weeks-- MET  - Pt will increase L UE to Mod Brooke with <20% cuing for tabletop tasks for improved functional performance of life roles and salient tasks 4 weeks--  MET  - Pt will demo with G understanding and carryover of desensitization strategies to be performed in home environment for improve tolerance to various touch sensations x 50% 4 weeks--  MET    Long Term Goals:  - Pt will increase automaticity of  L UE to 95% for resumption of B integrative tasks--  MET  - Pt will increase rate of prehension for all FM tasks x 95% for improved use of utensils, writing, ADL fasteners--  MET  - Pt will resume  L hand non-dominance to independent with all activities of daily living, IADLs, and meaningful occupations-- PARTIALLY MET  - Pt will increase L UE strength to 5/5 and  strength L=R, through the use of strengthening exercises and home program for improved overall QOL-- PARTIALLY MET  - Pt will tolerate NMES/FES for improved motor and sensory performance for overall improved hand to target with 95% accuracy-- PARTIALLY MET        -    Pt will demo with G understanding and carryover of desensitization strategies to be performed in home environment for improve tolerance to various touch sensations x 85%--- PARTIALLY MET      INTERVENTION COMMENTS:  Diagnosis: CVA  Precautions: N/A  FOTO: 53, with 68% limitation in UE function and 75% limitation in Hand function    60, with 50% limitation in UE function and 50% limitation in Hand function    60, with 50% limitation in Hand function and 55% limitation in UE function    58, with 50% limitation in Hand function and 60% limitation in UE function  Insurance: Payor: BLUE CROSS / Plan: [facility] PLAN 361 / Product Type: Blue Fee for Service /   5 of 8 visits through 11/15/2019

## 2019-11-13 NOTE — PROGRESS NOTES
Daily Note     Today's date: 2019  Patient name: Caitlyn Bullard  : 1960  MRN: [id number]  Referring provider: Mariama Melo DO  Dx:   Encounter Diagnosis     ICD-10-CM    1  Cerebrovascular accident (CVA), unspecified mechanism ([facility] [address] ) I63 9        Start Time: 1000  Stop Time: 1040  Total time in clinic (min): 40 minutes    Subjective: Pt reports she is doing well today      Objective: See treatment diary below    Exercise Diary 10/21 11/4 11/7 11/11 11/13   Endurance Scifit level 3 12 minutes 6 minute walk test  Scifit Level 3 10 minutes Scifit Level 3 10 minutes TM 1 5 mph, incline 2 0 x10 min (7/10 dizziness reported after stopping TM)     Lower Extremity Strengthening Step ups 6"  15 x 2 forwards/ sideway    Lunges  60' x 2 5x sit to stand Sit to stand from middle of foam steps  10x2, focus on improved anterior weight shifting 30 sec STS x3:  Trial 1: x11  Trial 2: x9  Trial 3: x9     Static Balance    Foam:  FTEC x1 min  HTs x10, HNs x10  Toe taps to cones x20 Foam 30 sec STS x3  Trial 1: x7  Trial 2: x7  Trial 3 x7    Dynamic Balance  Ambulation with treadmill, 1 finger on TM, HTs 30 sec on/1 min off,  initiated 1 2 mph, x 10 min    Forward progression head turns vert/ horizontal 60' x 2 (1 LOB each)    3 direction toe taps x60 " each TUG  FGA  10MWT Walking w/ HTs, HNs x 5 min  Tandem walking on firm x2 min  Tandem on foam beam x2 min  EC x2 min  Foam ball toss x2 min  Foam cross body reach to cones x5 min  Cone toe tap step overs x4 min Ambulation fwd/backward in clinic w/ cognitive challenge x 6 min  Balance beam fwd and lateral x40 ft each (repeated LOB w/ lateral stepping)    Ambulation fwd/backward/lateral in clinic w/ cognitive challenge x 6 min    Obstacle course: foam balance beam, low and high stepovers, cone toe taps, compliant and non compliant surfaces x8 min    Rapid stepping patterns in agility ladder x14 min                  Assessment: Tolerated treatment well   No LOB noted on obstacle course  Trialed agility ladder, pt is able to complete exercises but at decreased speed currently  Overall pt continues to complete advanced balance challenges w/ minimal balance errors observed  Patient demonstrated fatigue post treatment, exhibited good technique with therapeutic exercises and would benefit from continued PT      Plan: Continue per plan of care  INTERVENTION COMMENTS:  Diagnosis: CVA  Precautions: N/A  FOTO: 48, with 68% limitation in UE function and 75% limitation in Hand function    60, with 50% limitation in UE function and 50% limitation in Hand function    60, with 50% limitation in Hand function and 55% limitation in UE function  Insurance: Payor: Alexander Hathaway / Plan: [employer] PLAN 361 / Product Type: Blue Fee for Service /   2 of 8 visits through 11/ 15/2019, PN due 11/16/2019

## 2019-11-20 ENCOUNTER — OFFICE VISIT (OUTPATIENT)
Dept: PHYSICAL THERAPY | Facility: REHABILITATION | Age: 59
End: 2019-11-20
Payer: COMMERCIAL

## 2019-11-20 DIAGNOSIS — I63.9 CEREBROVASCULAR ACCIDENT (CVA), UNSPECIFIED MECHANISM (HCC): Primary | ICD-10-CM

## 2019-11-20 PROCEDURE — 97112 NEUROMUSCULAR REEDUCATION: CPT

## 2019-11-20 PROCEDURE — 97110 THERAPEUTIC EXERCISES: CPT

## 2019-11-20 NOTE — PROGRESS NOTES
Daily Note     Today's date: 2019  Patient name: Adore Hollins  : 1960  MRN: [id number]  Referring provider: Eduardo Sanchez DO  Dx:   Encounter Diagnosis     ICD-10-CM    1  Cerebrovascular accident (CVA), unspecified mechanism ([facility] [address] ) I63 9        Start Time: 1106  Stop Time: 1146  Total time in clinic (min): 40 minutes    Subjective: Pt reports she is doing well today  Objective: See treatment diary below    Exercise Diary    Endurance 6 minute walk test  Scifit Level 3 10 minutes Scifit Level 3 10 minutes TM 1 5 mph, incline 2 0 x10 min (7/10 dizziness reported after stopping TM)  Recumbent bike x10 min lv 3    Lower Extremity Strengthening 5x sit to stand Sit to stand from middle of foam steps  10x2, focus on improved anterior weight shifting 30 sec STS x3:  Trial 1: x11  Trial 2: x9  Trial 3: x9      Static Balance   Foam:  FTEC x1 min  HTs x10, HNs x10  Toe taps to cones x20 Foam 30 sec STS x3  Trial 1: x7  Trial 2: x7  Trial 3 x7     Dynamic Balance  TUG  FGA  10MWT Walking w/ HTs, HNs x 5 min  Tandem walking on firm x2 min  Tandem on foam beam x2 min  EC x2 min  Foam ball toss x2 min  Foam cross body reach to cones x5 min  Cone toe tap step overs x4 min Ambulation fwd/backward in clinic w/ cognitive challenge x 6 min  Balance beam fwd and lateral x40 ft each (repeated LOB w/ lateral stepping)    Ambulation fwd/backward/lateral in clinic w/ cognitive challenge x 6 min    Obstacle course: foam balance beam, low and high stepovers, cone toe taps, compliant and non compliant surfaces x8 min    Rapid stepping patterns in agility ladder x14 min Ambulation w/ fwd/backward/lateral/marches w/ cognitive challenge x15 minutes    Agility ladder, rapid stepping patterns w/ instruction to land on forefoot x15 min                  Assessment: Tolerated treatment well   Improvements noted today w/ use of agility ladder, pt was able to progress to increased speed w/ landing on forefoot  She does experience LOB but consistently compensates w/ hip and stepping strategy  Pt continues to progress with balance abilities and is completing higher level challenges which incorporate dynamic balance changes  Patient demonstrated fatigue post treatment, exhibited good technique with therapeutic exercises and would benefit from continued PT      Plan: Continue per plan of care  INTERVENTION COMMENTS:  Diagnosis: CVA  Precautions: N/A  FOTO: 48, with 68% limitation in UE function and 75% limitation in Hand function    60, with 50% limitation in UE function and 50% limitation in Hand function    60, with 50% limitation in Hand function and 55% limitation in UE function  Insurance: Payor: Zac Gonzáles / Plan: [employer] PLAN 361 / Product Type: Blue Fee for Service /   2 of 8 visits through 11/ 15/2019, PN due 11/16/2019

## 2019-11-27 ENCOUNTER — OFFICE VISIT (OUTPATIENT)
Dept: PHYSICAL THERAPY | Facility: REHABILITATION | Age: 59
End: 2019-11-27
Payer: COMMERCIAL

## 2019-11-27 DIAGNOSIS — I63.9 CEREBROVASCULAR ACCIDENT (CVA), UNSPECIFIED MECHANISM (HCC): Primary | ICD-10-CM

## 2019-11-27 PROCEDURE — 97112 NEUROMUSCULAR REEDUCATION: CPT

## 2019-11-27 PROCEDURE — 97110 THERAPEUTIC EXERCISES: CPT

## 2019-11-27 NOTE — PROGRESS NOTES
Daily Note     Today's date: 2019  Patient name: Cecelia Antonio  : 1960  MRN: [id number]  Referring provider: Terra Palacio DO  Dx:   Encounter Diagnosis     ICD-10-CM    1  Cerebrovascular accident (CVA), unspecified mechanism ([facility] [address] ) I63 9                   Subjective: Pt reports she is doing well today  Objective: See treatment diary below    Exercise Diary    Endurance TM 1 5 mph, incline 2 0 x10 min (7/10 dizziness reported after stopping TM)  Recumbent bike x10 min lv 3 Scifit: level 3, 11 minutes    Lower Extremity Strengthening 30 sec STS x3:  Trial 1: x11  Trial 2: x9  Trial 3: x9       Static Balance Foam:  FTEC x1 min  HTs x10, HNs x10  Toe taps to cones x20 Foam 30 sec STS x3  Trial 1: x7  Trial 2: x7  Trial 3 x7      Dynamic Balance  Ambulation fwd/backward in clinic w/ cognitive challenge x 6 min  Balance beam fwd and lateral x40 ft each (repeated LOB w/ lateral stepping)    Ambulation fwd/backward/lateral in clinic w/ cognitive challenge x 6 min    Obstacle course: foam balance beam, low and high stepovers, cone toe taps, compliant and non compliant surfaces x8 min    Rapid stepping patterns in agility ladder x14 min Ambulation w/ fwd/backward/lateral/marches w/ cognitive challenge x15 minutes    Agility ladder, rapid stepping patterns w/ instruction to land on forefoot x15 min Ambulating with cone taps in front 40ft x 6    Marching with raising opposite arm opposite left x 6    Marching with alternating leg taps to opposite left x 6    Stepping over yellow hurdles in agility ladder: 20ft x 6 with 5 yellow hurdles                 Assessment: Tolerated treatment well, tolerating the progression of dynamic balance exercises  Little instability noted with marching with alternating knee taps during the session  Carrolltonsheron Wrad was able to maintain her balance despite this instability demonstrating good strategies to maintain her balance   However, Bladimir Ward reported pain in her right knee during the session which appeared to be arthritic in nature  As the pain was relieved with rest and provoked with weight-bearing exercises  Anticipate discharge from outpatient physical therapy pending her progress  Patient demonstrated fatigue post treatment, exhibited good technique with therapeutic exercises and would benefit from continued PT      Plan: Continue per plan of care  INTERVENTION COMMENTS:  Diagnosis: CVA  Precautions: N/A  FOTO: 48, with 68% limitation in UE function and 75% limitation in Hand function    60, with 50% limitation in UE function and 50% limitation in Hand function    60, with 50% limitation in Hand function and 55% limitation in UE function  Insurance: Payor: Davion Cline / Plan: [facility] PLAN 361 / Product Type: Blue Fee for Service /   2 of 8 visits through 11/ 15/2019, PN due 11/16/2019

## 2019-12-04 ENCOUNTER — EVALUATION (OUTPATIENT)
Dept: PHYSICAL THERAPY | Facility: REHABILITATION | Age: 59
End: 2019-12-04
Payer: COMMERCIAL

## 2019-12-04 DIAGNOSIS — I63.9 CEREBROVASCULAR ACCIDENT (CVA), UNSPECIFIED MECHANISM (HCC): Primary | ICD-10-CM

## 2019-12-04 PROCEDURE — 97112 NEUROMUSCULAR REEDUCATION: CPT

## 2019-12-04 PROCEDURE — 97110 THERAPEUTIC EXERCISES: CPT

## 2019-12-04 NOTE — LETTER
May 1, 2020    Raymond Neri, [address] [facility] [address]    Patient: Elmo Alfred   YOB: 1960   Date of Visit: 2019     Encounter Diagnosis     ICD-10-CM    1  Cerebrovascular accident (CVA), unspecified mechanism ([facility] [address] ) I63 9 CANCELED: PT plan of care cert/re-cert       Dear Dr Mandy Kraft: Thank you for your recent referral of Elmo Alfred  Please review the attached evaluation summary from Batsheva's recent visit  Please verify that you agree with the plan of care by signing the attached order  If you have any questions or concerns, please do not hesitate to call  I sincerely appreciate the opportunity to share in the care of one of your patients and hope to have another opportunity to work with you in the near future  Sincerely,    Latasha Partida, PT      Referring Provider:      I certify that I have read the below Plan of Care and certify the need for these services furnished under this plan of treatment while under my care  Raymondscott Neri DO  [address]: [phone]          Re/Evaluation/Daily Note     Today's date: 2019  Patient name: Elmo Alfred  : 1960  MRN: [id number]  Referring provider: Wolfgang Goodson DO  Dx:   Encounter Diagnosis     ICD-10-CM    1  Cerebrovascular accident (CVA), unspecified mechanism ([facility] [address] ) I63 9        Start Time: 1000  Stop Time: 1055  Total time in clinic (min): 55 minutes    Subjective:  Patient reports having difficulty walking when she is walking for too long  Reports increased fatigue and tripping with activities longer than 30 minutes      Objective: See treatment diary below  Re-eval  next     Manual Muscle Testing - Hip Right Left   Flexion 5 5   Abduction 5 5   Adduction 5 5   Extension 4+ 4      Manual Muscle Testing - Knee Right Left   Flexion 5 4+   Extension 5 4      Manual Muscle Testing - Ankle Right Left Doriflexion 5 4   Plantarflexion 5 4+      Lower extremity Range of Motion     Knee Flexion R: 120* L: 110*   Knee Extension R: 5* L5*         Gait: Patient ambulates with a step through pattern with  decreased Left reciprocal arm movement due to pain limiting normal excursion  Decreased left foot clearance with fatigue post4 minutes walking and decreasing RIRI with increased risk of fall       Outcome Measures 7/11 9/10 11/4   12/04   6 Minute Walk Test (ft): 1,060 feet, numbness in left arm and 5/10 lightheadedness      Post /88mmHg 1,262 feet     Post BP: 138/86 mmHg, 82 bpm 1,440 feet   1280 while speaking  98 HR 97% SPO2   130/88   10MWT (s):  15 48 seconds with assistive device      14 93 seconds without assistive device 10 53 seconds without assistive device,  95 meter/second 10 38 seconds without an assistive device  10 4 sec S device  13 sec cog counting back by 5   5x Sit To Stand (s):  21 94 seconds, 17" chair with arms across chest 16 21 seconds, 17" chair with arms across chest 15 00 seconds, 17" chair with arms across chest  16 sec hands across body       TUG (s) 22 54 seconds with rolling walker     14 43 seconds without assistive device  10 67 seconds without assistive device 9 91 seconds without an assistive device  9 sec without device     15 seconds with cog component  Counting back by 7   FGA   23/30 25/30   25/30        Functional Gait Assessment  11/04  3/3 Gait level surface  3/3 Change in gait speed  3/3 Gait with horizontal head turns  3/3 Gait with vertical head turns  3/3 Gait and pivot turn  2/3 Step over obstacle  1/3 Gait with narrow base of support  2/3 Gait with eyes closed  2/3 Ambulating backwards  3/3 Steps  25/30 Total score (less than 22/30 indicates increased risk of fall)             SARKIS Test (ages 12+, number of errors--maximum is 10--per each position, all measured with eyes closed and hands on hips)     0 errors feet together     10 errors tandem     10 errors single leg stance (Leg foot)     4errors feet together, foam     10 errors tandem, foam  ( left foot behind)     10 errors single leg stance, foam      44 Total Errors        ASSESSMENT  Patient presents to outpatient physical therapy with sx consistent with CVA  Patient has been making steady progress towards goals  Increased intensity of challenged activities with identified deterioration of activity with minimal changes  Client's gait pattern is improving, but compromise is seen with fatigue during food shopping, family gatherings and ambulation  She will continue to benefit from skilled PT intervention to maximize functional mobility and improve outcome measures,specially those that have a cognitive component which affect the functional mobility  Will progress client to increased balance challenged including weighted object carrying      STG's:   NEW- Patient will complete TUG with cognitive to less than 10 sec            Patient will improve 5x sit to stand by 4 seconds            Patient will improve SARKIS test to 30 errors  OLD  - Patient will be independent with home exercise program in 2 weeks  - MET  - Patient will complete the TUG in no more than 10 seconds to demonstrate improved functional mobility  - MET  -Patient will complete the 5x sit to  no more than 15 seconds to demonstrate improved functional mobility and functional strength  - MET  - Patient will demonstrate an improvement in gait speed by  1m/sec to demonstrate improved functional mobility  -  MET     LTG's:  NEW:- Patient will be able to ambulate 15 consecutive minutes without LOB and good LE clearance  - Patient will improve SARKIS test to less than 25 erroros  - Patient will complete the 5x sit to  no more than 11 seconds to demonstrate improved functional mobility and functional strength  - Progress, NOT MET  - Patient will demonstrate an improvement in gait speed by  2m/sec to demonstrate improved functional mobility   - MET  - Patient will complete 6 minute walk test demonstrating no more than slight gait deviations to demonstrate improved functional mobility and improved gait mechanics -  MET  - Patient will score at least a 26/30 on the FGA to demonstrate improve dynamic balance and decrease her risk of experiencing a fall at time of discharge  - Progressing, NOT MET     PLAN OF CARE  Patient will benefit from skilled outpatient physical therapy 1-2 x/wk for 8 weeks  Therapeutic interventions to include therapeutic activity, therapeutic exercise, neuromuscular re-education  as listed below in the grids       Objective: See treatment diary below    Exercise Diary    Endurance TM 1 5 mph, incline 2 0 x10 min (7/10 dizziness reported after stopping TM)  Recumbent bike x10 min lv 3 Scifit: level 3, 11 minutes Scifit level 3 10 min    Lower Extremity Strengthening 30 sec STS x3:  Trial 1: x11  Trial 2: x9  Trial 3: x9        Static Balance Foam:  FTEC x1 min  HTs x10, HNs x10  Toe taps to cones x20 Foam 30 sec STS x3  Trial 1: x7  Trial 2: x7  Trial 3 x7   See above    Dynamic Balance  Ambulation fwd/backward in clinic w/ cognitive challenge x 6 min  Balance beam fwd and lateral x40 ft each (repeated LOB w/ lateral stepping)    Ambulation fwd/backward/lateral in clinic w/ cognitive challenge x 6 min    Obstacle course: foam balance beam, low and high stepovers, cone toe taps, compliant and non compliant surfaces x8 min    Rapid stepping patterns in agility ladder x14 min Ambulation w/ fwd/backward/lateral/marches w/ cognitive challenge x15 minutes    Agility ladder, rapid stepping patterns w/ instruction to land on forefoot x15 min Ambulating with cone taps in front 40ft x 6    Marching with raising opposite arm opposite left x 6    Marching with alternating leg taps to opposite left x 6    Stepping over yellow hurdles in agility ladder: 20ft x 6 with 5 yellow hurdles See above     INTERVENTION COMMENTS:  Diagnosis: CVA  Precautions: N/A  FOTO: 48, with 68% limitation in UE function and 75% limitation in Hand function    60, with 50% limitation in UE function and 50% limitation in Hand function    60, with 50% limitation in Hand function and 55% limitation in UE function  Insurance: Payor: Jordan Layer / Plan: [employer] PLAN 361 / Product Type: Blue Fee for Service /   2 of 8 visits through 11/ 15/2019, PN due 11/16/2019

## 2019-12-04 NOTE — PROGRESS NOTES
Re/Evaluation/Daily Note     Today's date: 2019  Patient name: Flori Chery  : 1960  MRN: [id number]  Referring provider: Charanjit Arciniega DO  Dx:   Encounter Diagnosis     ICD-10-CM    1  Cerebrovascular accident (CVA), unspecified mechanism ([facility] [address] ) I63 9        Start Time: 1000  Stop Time: 1055  Total time in clinic (min): 55 minutes    Subjective: Patient reports having difficulty walking when she is walking for too long  Reports increased fatigue and tripping with activities longer than 30 minutes  Objective: See treatment diary below  Re-eval  next     Manual Muscle Testing - Hip Right Left   Flexion 5 5   Abduction 5 5   Adduction 5 5   Extension 4+ 4      Manual Muscle Testing - Knee Right Left   Flexion 5 4+   Extension 5 4      Manual Muscle Testing - Ankle Right Left   Doriflexion 5 4   Plantarflexion 5 4+      Lower extremity Range of Motion     Knee Flexion R: 120* L: 110*   Knee Extension R: 5* L5*         Gait: Patient ambulates with a step through pattern with  decreased Left reciprocal arm movement due to pain limiting normal excursion   Decreased left foot clearance with fatigue post4 minutes walking and decreasing RIRI with increased risk of fall       Outcome Measures 7/11 9/10 11/4   12/04   6 Minute Walk Test (ft): 1,060 feet, numbness in left arm and 5/10 lightheadedness      Post /88mmHg 1,262 feet     Post BP: 138/86 mmHg, 82 bpm 1,440 feet   1280 while speaking  98 HR 97% SPO2   130/88   10MWT (s):  15 48 seconds with assistive device      14 93 seconds without assistive device 10 53 seconds without assistive device,  95 meter/second 10 38 seconds without an assistive device  10 4 sec S device  13 sec cog counting back by 5   5x Sit To Stand (s):  21 94 seconds, 17" chair with arms across chest 16 21 seconds, 17" chair with arms across chest 15 00 seconds, 17" chair with arms across chest  16 sec hands across body       TUG (s) 22 54 seconds with rolling walker     14 43 seconds without assistive device  10 67 seconds without assistive device 9 91 seconds without an assistive device  9 sec without device     15 seconds with cog component  Counting back by 7   FGA   23/30 25/30 25/30        Functional Gait Assessment  11/04  3/3 Gait level surface  3/3 Change in gait speed  3/3 Gait with horizontal head turns  3/3 Gait with vertical head turns  3/3 Gait and pivot turn  2/3 Step over obstacle  1/3 Gait with narrow base of support  2/3 Gait with eyes closed  2/3 Ambulating backwards  3/3 Steps  25/30 Total score (less than 22/30 indicates increased risk of fall)             SARKIS Test (ages 12+, number of errors--maximum is 10--per each position, all measured with eyes closed and hands on hips)     0 errors feet together     10 errors tandem     10 errors single leg stance (Leg foot)     4errors feet together, foam     10 errors tandem, foam  ( left foot behind)     10 errors single leg stance, foam      44 Total Errors        ASSESSMENT  Patient presents to outpatient physical therapy with sx consistent with CVA  Patient has been making steady progress towards goals  Increased intensity of challenged activities with identified deterioration of activity with minimal changes  Client's gait pattern is improving, but compromise is seen with fatigue during food shopping, family gatherings and ambulation  She will continue to benefit from skilled PT intervention to maximize functional mobility and improve outcome measures,specially those that have a cognitive component which affect the functional mobility  Will progress client to increased balance challenged including weighted object carrying      STG's:   NEW- Patient will complete TUG with cognitive to less than 10 sec            Patient will improve 5x sit to stand by 4 seconds            Patient will improve SARKIS test to 30 errors  OLD  - Patient will be independent with home exercise program in 2 weeks  - MET  - Patient will complete the TUG in no more than 10 seconds to demonstrate improved functional mobility  - MET  -Patient will complete the 5x sit to  no more than 15 seconds to demonstrate improved functional mobility and functional strength  - MET  - Patient will demonstrate an improvement in gait speed by  1m/sec to demonstrate improved functional mobility  -  MET     LTG's:  NEW:- Patient will be able to ambulate 15 consecutive minutes without LOB and good LE clearance  - Patient will improve SARKIS test to less than 25 erroros  - Patient will complete the 5x sit to  no more than 11 seconds to demonstrate improved functional mobility and functional strength  - Progress, NOT MET  - Patient will demonstrate an improvement in gait speed by  2m/sec to demonstrate improved functional mobility  - MET  - Patient will complete 6 minute walk test demonstrating no more than slight gait deviations to demonstrate improved functional mobility and improved gait mechanics -  MET  - Patient will score at least a 26/30 on the FGA to demonstrate improve dynamic balance and decrease her risk of experiencing a fall at time of discharge  - Progressing, NOT MET     PLAN OF CARE  Patient will benefit from skilled outpatient physical therapy 1-2 x/wk for 8 weeks  Therapeutic interventions to include therapeutic activity, therapeutic exercise, neuromuscular re-education  as listed below in the grids       Objective: See treatment diary below    Exercise Diary 11/11 11/13 11/20 11/27 12/04   Endurance TM 1 5 mph, incline 2 0 x10 min (7/10 dizziness reported after stopping TM)  Recumbent bike x10 min lv 3 Scifit: level 3, 11 minutes Scifit level 3 10 min    Lower Extremity Strengthening 30 sec STS x3:  Trial 1: x11  Trial 2: x9  Trial 3: x9        Static Balance Foam:  FTEC x1 min  HTs x10, HNs x10  Toe taps to cones x20 Foam 30 sec STS x3  Trial 1: x7  Trial 2: x7  Trial 3 x7   See above    Dynamic Balance  Ambulation fwd/backward in clinic w/ cognitive challenge x 6 min  Balance beam fwd and lateral x40 ft each (repeated LOB w/ lateral stepping)    Ambulation fwd/backward/lateral in clinic w/ cognitive challenge x 6 min    Obstacle course: foam balance beam, low and high stepovers, cone toe taps, compliant and non compliant surfaces x8 min    Rapid stepping patterns in agility ladder x14 min Ambulation w/ fwd/backward/lateral/marches w/ cognitive challenge x15 minutes    Agility ladder, rapid stepping patterns w/ instruction to land on forefoot x15 min Ambulating with cone taps in front 40ft x 6    Marching with raising opposite arm opposite left x 6    Marching with alternating leg taps to opposite left x 6    Stepping over yellow hurdles in agility ladder: 20ft x 6 with 5 yellow hurdles See above                  INTERVENTION COMMENTS:  Diagnosis: CVA  Precautions: N/A  FOTO: 53, with 68% limitation in UE function and 75% limitation in Hand function    60, with 50% limitation in UE function and 50% limitation in Hand function    60, with 50% limitation in Hand function and 55% limitation in UE function  Insurance: Payor: Tania Alexander / Plan: [facility] PLAN 361 / Product Type: Blue Fee for Service /   2 of 8 visits through 11/ 15/2019, PN due 2019

## 2019-12-06 ENCOUNTER — TELEPHONE (OUTPATIENT)
Dept: NEUROLOGY | Facility: CLINIC | Age: 59
End: 2019-12-06

## 2019-12-06 NOTE — TELEPHONE ENCOUNTER
Patient's daughter called in to ask about the EMG that was ordered for her mom  She is there with her mom  Sandra Harkins and I looked into the chart  The patient's daughter said it was ordered because she has pain in her arm  They wanted help to get it scheduled  Since we don't have any MACRELL or Consent for the daughter, Sandra Harkins helped me to get a verbal in [place] from her mom  I let them know that the next time they are here we will get the consent form completed  I made the call with them and got it scheduled for 12/24/19 at 11:00am and told the patient's daughter if they need anything else to give us a call

## 2019-12-09 ENCOUNTER — OFFICE VISIT (OUTPATIENT)
Dept: PHYSICAL THERAPY | Facility: REHABILITATION | Age: 59
End: 2019-12-09
Payer: COMMERCIAL

## 2019-12-09 DIAGNOSIS — I63.9 CEREBROVASCULAR ACCIDENT (CVA), UNSPECIFIED MECHANISM (HCC): Primary | ICD-10-CM

## 2019-12-09 PROCEDURE — 97110 THERAPEUTIC EXERCISES: CPT

## 2019-12-09 PROCEDURE — 97112 NEUROMUSCULAR REEDUCATION: CPT

## 2019-12-09 NOTE — PROGRESS NOTES
Daily Note     Today's date: 2019  Patient name: Ellen Barker  : 1960  MRN: [id number]  Referring provider: Zeferino Ruiz DO  Dx:   Encounter Diagnosis     ICD-10-CM    1  Cerebrovascular accident (CVA), unspecified mechanism ([facility] [address] ) I63 9        Start Time: 1000  Stop Time: 1043  Total time in clinic (min): 43 minutes    Subjective: Pt reports she is doing well today  Objective: See treatment diary below    Exercise Diary    Endurance Recumbent bike x10 min lv 3 Scifit: level 3, 11 minutes Scifit level 3 10 min Scifit level 5, 15 min    Lower Extremity Strengthening           Static Balance     See above     Dynamic Balance  Ambulation w/ fwd/backward/lateral/marches w/ cognitive challenge x15 minutes     Agility ladder, rapid stepping patterns w/ instruction to land on forefoot x15 min Ambulating with cone taps in front 40ft x 6     Marching with raising opposite arm opposite left x 6     Marching with alternating leg taps to opposite left x 6     Stepping over yellow hurdles in agility ladder: 20ft x 6 with 5 yellow hurdles See above Obstacle progressing in difficulty, incorporating stepovers, cone negotiation, 360* turns, lateral stepping, toe taps, crossover stepping, cognitive/dual task challenges, and holding weighted object in Rt hand x28 minutes                             Assessment: Tolerated treatment well  Continued progression of advanced balance exercises, incorporating multiple challenges at once  Attempted having pt hold 2 weights, but she reports cramping in her Left arm, deferred use of 2 weights and had pt just complete holding weight in Rt hand  Some increased instability noted w/ cognitive challenges, and pt will stop activity at times as she will be unable to complete task while completing cognitive challenge   Pt is most challenged still w/ exercises on single leg, recommend continuing to progress this skill w/ pt and balance on foam  Patient exhibited good technique with therapeutic exercises and would benefit from continued PT      Plan: Continue per plan of care  Continue dual task challenges w/ balance, incorporate single leg stance and balance challenges on compliant surfaces

## 2019-12-09 NOTE — PROGRESS NOTES
OTR will be D/Cing Pt at this time 2*  30-day hold  Pt waiting for EMG to determine possible cause of presenting ongoing pain in LUE

## 2019-12-16 ENCOUNTER — OFFICE VISIT (OUTPATIENT)
Dept: PHYSICAL THERAPY | Facility: REHABILITATION | Age: 59
End: 2019-12-16
Payer: COMMERCIAL

## 2019-12-16 DIAGNOSIS — I63.9 CEREBROVASCULAR ACCIDENT (CVA), UNSPECIFIED MECHANISM (HCC): Primary | ICD-10-CM

## 2019-12-16 PROCEDURE — 97112 NEUROMUSCULAR REEDUCATION: CPT

## 2019-12-16 PROCEDURE — 97110 THERAPEUTIC EXERCISES: CPT

## 2019-12-16 NOTE — PROGRESS NOTES
Daily Note     Today's date: 2019  Patient name: Stanford Grey  : 1960  MRN: [id number]  Referring provider: Liya Lane DO  Dx:   Encounter Diagnosis     ICD-10-CM    1  Cerebrovascular accident (CVA), unspecified mechanism ([facility] [address] ) I63 9                   Subjective: Pt reports she is doing well today  Objective: See treatment diary below    Exercise Diary    Endurance Scifit: level 3, 11 minutes Scifit level 3 10 min Scifit level 5, 15 min Scifit level 5, 15 min    Lower Extremity Strengthening          Static Balance   See above      Dynamic Balance  Ambulating with cone taps in front 40ft x 6     Marching with raising opposite arm opposite left x 6     Marching with alternating leg taps to opposite left x 6     Stepping over yellow hurdles in agility ladder: 20ft x 6 with 5 yellow hurdles See above Obstacle progressing in difficulty, incorporating stepovers, cone negotiation, 360* turns, lateral stepping, toe taps, crossover stepping, cognitive/dual task challenges, and holding weighted object in Rt hand x28 minutes Obstacle progressing in difficulty, incorporating stepovers, cone negotiation, 360* turns, lateral stepping, toe taps, crossover stepping, cognitive/dual task challenges, and holding weighted object in Rt hand using 2# weight x20 minutes      Marching with alternating leg taps to opposite left    Ambulating with cone taps in front 100ft x 5                              Assessment: Tolerated treatment well, tolerating the progression of advanced balance exercises, incorporating multiple challenges at once  Batsheva tolerated holding 2 weights with no subjective reports of cramping in her left arm this session  She continues with instability during cognitive challenges while performing balance exercises  Single leg balance exercise were progressed with alternating leg taps which caused her to have some increase instability   Patient exhibited good technique with therapeutic exercises and would benefit from continued PT      Plan: Continue per plan of care  Continue dual task challenges w/ balance, incorporate single leg stance and balance challenges on compliant surfaces

## 2019-12-18 ENCOUNTER — OFFICE VISIT (OUTPATIENT)
Dept: PHYSICAL THERAPY | Facility: REHABILITATION | Age: 59
End: 2019-12-18
Payer: COMMERCIAL

## 2019-12-18 DIAGNOSIS — I63.9 CEREBROVASCULAR ACCIDENT (CVA), UNSPECIFIED MECHANISM (HCC): Primary | ICD-10-CM

## 2019-12-18 PROCEDURE — 97112 NEUROMUSCULAR REEDUCATION: CPT

## 2019-12-18 PROCEDURE — 97116 GAIT TRAINING THERAPY: CPT

## 2019-12-18 NOTE — PROGRESS NOTES
Discharge/Daily Note     Today's date: 2019  Patient name: Tamiko Moser  : 1960  MRN: [id number]  Referring provider: Ketty Casper DO  Dx:   Encounter Diagnosis     ICD-10-CM    1  Cerebrovascular accident (CVA), unspecified mechanism ([facility] [address] ) I63 9        Start Time: 1000  Stop Time: 1055  Total time in clinic (min): 55 minutes    Subjective:Patient reports fatigue with  Prolonged ambulation  Objective: See treatment diary below  Re-eval  next     Manual Muscle Testing - Hip Right Left   Flexion 5 5   Abduction 5 5   Adduction 5 5   Extension 4+ 4      Manual Muscle Testing - Knee Right Left   Flexion 5 4+   Extension 5 4      Manual Muscle Testing - Ankle Right Left   Doriflexion 5 4   Plantarflexion 5 4+      Lower extremity Range of Motion     Knee Flexion R: 120* L: 115*   Knee Extension R: )* L: 0*         Gait: Patient ambulates with a step through pattern with  decreased Left reciprocal arm movement due to pain limiting normal excursion   Decreased left foot clearance with fatigue post4 minutes walking and decreasing RIRI with increased risk of fall       Outcome Measures 7/11 9/10 11/4   12/04    12/18   6 Minute Walk Test (ft): 1,060 feet, numbness in left arm and 5/10 lightheadedness      Post /88mmHg 1,262 feet     Post BP: 138/86 mmHg, 82 bpm 1,440 feet   1280 while speaking  98 HR 97% SPO2   130/88   104 HR  97% 1300 while speaking   129/92     10MWT (s):  15 48 seconds with assistive device      14 93 seconds without assistive device 10 53 seconds without assistive device,  95 meter/second 10 38 seconds without an assistive device  10 4 sec S device  13 sec cog counting back by 5    8 sec       11 sec  cog   5x Sit To Stand (s):  21 94 seconds, 17" chair with arms across chest 16 21 seconds, 17" chair with arms across chest 15 00 seconds, 17" chair with arms across chest  16 sec hands across body        13 sec   TUG (s) 22 54 seconds with rolling walker     14 43 seconds without assistive device  10 67 seconds without assistive device 9 91 seconds without an assistive device  9 sec without device     15 seconds with cog component  Counting back by 7   8 sec      14 sec cog   FGA   23/30 25/30 25/30 25/30        Functional Gait Assessment  11/04  3/3 Gait level surface  3/3 Change in gait speed  3/3 Gait with horizontal head turns  3/3 Gait with vertical head turns  3/3 Gait and pivot turn  2/3 Step over obstacle  1/3 Gait with narrow base of support  2/3 Gait with eyes closed  2/3 Ambulating backwards  2/3 Steps  25/30 Total score (less than 22/30 indicates increased risk of fall)             SARKIS Test (ages 12+, number of errors--maximum is 10--per each position, all measured with eyes closed and hands on hips)     0 errors feet together     10 errors tandem     10 errors single leg stance (Leg foot)     2    errors feet together, foam     10 errors tandem, foam  ( left foot behind)     10 errors single leg stance, foam      42 Total Errors        ASSESSMENT  Patient presents to outpatient physical therapy with sx consistent with CVA  Patient has been making steady progress towards goals  Patient was re-assessed to day and shows minimal improvement from last reassessment with consistent decreased functional outcome measures with cognitive component  Patient's Sarkis score and one point difference continuing to demonstrate balance deficits with eyes close or in dark environments  Patient will be undergoing testing for her arm and with holidays, she reports she will be having difficulties with scheduling PT  Discussed importance of continued HEP and setting up an ambulation program to maintain mobility      PT will be discharged at this time and we re-assess if condition deteriorates once she is able to return to PT   STG's:   NEW- Patient will complete TUG with cognitive to less than 10 sec MET            Patient will improve 5x sit to stand by 4 seconds partially met Patient will improve SARKIS test to 30 errors NOT MET  OLD  - Patient will be independent with home exercise program in 2 weeks  - MET  - Patient will complete the TUG in no more than 10 seconds to demonstrate improved functional mobility  - MET  -Patient will complete the 5x sit to  no more than 15 seconds to demonstrate improved functional mobility and functional strength  - MET  - Patient will demonstrate an improvement in gait speed by  1m/sec to demonstrate improved functional mobility  -  MET     LTG's:  NEW:- Patient will be able to ambulate 15 consecutive minutes without LOB and good LE clearance  Partially met  D/C to HEP           - Patient will improve SARKIS test to less than 25 erroros NOT MET  - Patient will complete the 5x sit to  no more than 11 seconds to demonstrate improved functional mobility and functional strength  - Progress, NOT MET  - Patient will demonstrate an improvement in gait speed by  2m/sec to demonstrate improved functional mobility  - MET  - Patient will complete 6 minute walk test demonstrating no more than slight gait deviations to demonstrate improved functional mobility and improved gait mechanics -  MET  - Patient will score at least a 26/30 on the FGA to demonstrate improve dynamic balance and decrease her risk of experiencing a fall at time of discharge  - Progressing, NOT MET     PLAN OF CARE  Will d/c PT at this time

## 2019-12-24 ENCOUNTER — HOSPITAL ENCOUNTER (OUTPATIENT)
Dept: NEUROLOGY | Facility: CLINIC | Age: 59
Discharge: HOME | End: 2019-12-24
Payer: COMMERCIAL

## 2019-12-24 DIAGNOSIS — R20.2 PARESTHESIA: ICD-10-CM

## 2019-12-24 PROCEDURE — 95886 MUSC TEST DONE W/N TEST COMP: CPT | Performed by: PHYSICAL MEDICINE & REHABILITATION

## 2019-12-24 PROCEDURE — 95912 NRV CNDJ TEST 11-12 STUDIES: CPT | Performed by: PHYSICAL MEDICINE & REHABILITATION

## 2020-01-03 ENCOUNTER — TELEPHONE (OUTPATIENT)
Dept: NEUROLOGY | Facility: CLINIC | Age: 60
End: 2020-01-03

## 2020-01-03 NOTE — TELEPHONE ENCOUNTER
Called patient LVm for patient to call back to reschedule apt due to provider inpatient that week   [facility]

## 2020-01-14 ENCOUNTER — OFFICE VISIT (OUTPATIENT)
Dept: FAMILY MEDICINE CLINIC | Facility: CLINIC | Age: 60
End: 2020-01-14
Payer: COMMERCIAL

## 2020-01-14 VITALS
BODY MASS INDEX: 34.72 KG/M2 | WEIGHT: 208.4 LBS | TEMPERATURE: 97.8 F | SYSTOLIC BLOOD PRESSURE: 152 MMHG | HEART RATE: 93 BPM | OXYGEN SATURATION: 98 % | DIASTOLIC BLOOD PRESSURE: 82 MMHG | HEIGHT: 65 IN

## 2020-01-14 DIAGNOSIS — G56.02 CARPAL TUNNEL SYNDROME OF LEFT WRIST: ICD-10-CM

## 2020-01-14 DIAGNOSIS — Z11.59 NEED FOR HEPATITIS C SCREENING TEST: Primary | ICD-10-CM

## 2020-01-14 DIAGNOSIS — I63.9 THALAMIC STROKE (HCC): ICD-10-CM

## 2020-01-14 DIAGNOSIS — Z11.4 SCREENING FOR HUMAN IMMUNODEFICIENCY VIRUS: ICD-10-CM

## 2020-01-14 DIAGNOSIS — G60.8 POLYNEUROPATHY, PERIPHERAL SENSORIMOTOR AXONAL: ICD-10-CM

## 2020-01-14 DIAGNOSIS — I10 ESSENTIAL HYPERTENSION: ICD-10-CM

## 2020-01-14 PROCEDURE — 3008F BODY MASS INDEX DOCD: CPT | Performed by: FAMILY MEDICINE

## 2020-01-14 PROCEDURE — 99204 OFFICE O/P NEW MOD 45 MIN: CPT | Performed by: FAMILY MEDICINE

## 2020-01-14 RX ORDER — AMLODIPINE BESYLATE 2.5 MG/1
2.5 TABLET ORAL DAILY
Qty: 30 TABLET | Refills: 5 | Status: SHIPPED | OUTPATIENT
Start: 2020-01-14 | End: 2020-01-14

## 2020-01-14 NOTE — PROGRESS NOTES
Chief Complaint   Patient presents with    Hypertension    Extremity Weakness     (left arm) and numbess since stroke 6/29/2019   Abdominal Pain     left upper and side cramping        Assessment/Plan:  Reviewed PMH with patient and daughter  Ortho evaluation  Discussed nutrition and activity level  Diagnoses and all orders for this visit:    Need for hepatitis C screening test  -     Hepatitis C antibody; Future    Screening for human immunodeficiency virus  -     HIV 1/2 AG-AB combo; Future    Thalamic stroke (HCC)    Polyneuropathy, peripheral sensorimotor axonal    Carpal tunnel syndrome of left wrist  -     Ambulatory referral to Orthopedic Surgery; Future    Essential hypertension  -     amLODIPine (NORVASC) 2 5 mg tablet; Take 1 tablet (2 5 mg total) by mouth daily  -     amLODIPine (NORVASC) 2 5 mg tablet; Take 1 tablet (2 5 mg total) by mouth daily          Subjective:      Patient ID: Melina Ham is a 60 y o  female  First visit very pleasant patient  S/P CVA  Reviewed records  EMG consistant with sensory changes left arm and carpel tunnel  BP been little elevated  Left hand digits 4 and 5 numb  Skin sensitivity left arm along with left lateral thorax, these occurred after CVA  The following portions of the patient's history were reviewed and updated as appropriate: allergies, current medications, past family history, past medical history, past social history, past surgical history and problem list   I have spent 45 minutes with Patient and family today in which greater than 50% of this time was spent in counseling/coordination of care regarding Diagnostic results, Prognosis, Risks and benefits of tx options, Intructions for management, Patient and family education, Risk factor reductions and Impressions  Review of Systems   Constitutional: Negative  HENT: Negative  Eyes: Negative  Respiratory: Negative  Cardiovascular: Negative      Gastrointestinal: Negative  Genitourinary: Negative  Musculoskeletal: Negative  Skin:        Sensitivity to touch left arm  Neurological:        Numbness digit 4 and 5 left hand  Psychiatric/Behavioral: Negative  Objective:      Pulse 93   Temp 97 8 °F (36 6 °C) (Oral)   Ht 5' 5" (1 651 m)   Wt 94 5 kg (208 lb 6 4 oz)   SpO2 98%   BMI 34 68 kg/m²       Current Outpatient Medications:     aspirin 81 mg chewable tablet, Chew 1 tablet (81 mg total) daily, Disp: , Rfl: 0    atorvastatin (LIPITOR) 80 mg tablet, Take 0 5 tablets (40 mg total) by mouth daily with dinner, Disp: 30 tablet, Rfl: 3    ibuprofen (MOTRIN) 200 mg tablet, Take 400 mg by mouth every 6 (six) hours as needed for mild pain, Disp: , Rfl:     pantoprazole (PROTONIX) 40 mg tablet, Take 1 tablet (40 mg total) by mouth daily in the early morning, Disp: 30 tablet, Rfl: 1     No Known Allergies     Physical Exam   Constitutional: She is oriented to person, place, and time  She appears well-developed and well-nourished  HENT:   Head: Normocephalic and atraumatic  Right Ear: External ear normal    Left Ear: External ear normal    Nose: Nose normal    Mouth/Throat: Oropharynx is clear and moist    Eyes: Pupils are equal, round, and reactive to light  Conjunctivae and EOM are normal    Neck: Normal range of motion  Neck supple  Cardiovascular: Normal rate, regular rhythm, normal heart sounds and intact distal pulses  Pulmonary/Chest: Effort normal and breath sounds normal    Abdominal: Soft  Bowel sounds are normal    Neurological: She is alert and oriented to person, place, and time  She has normal reflexes  Skin: Skin is warm and dry  Psychiatric: She has a normal mood and affect   Her behavior is normal  Judgment and thought content normal

## 2020-01-15 RX ORDER — AMLODIPINE BESYLATE 2.5 MG/1
2.5 TABLET ORAL DAILY
Qty: 30 TABLET | Refills: 5 | Status: SHIPPED | OUTPATIENT
Start: 2020-01-15 | End: 2020-10-28 | Stop reason: SDUPTHER

## 2020-01-24 ENCOUNTER — CONSULT (OUTPATIENT)
Dept: OBGYN CLINIC | Facility: MEDICAL CENTER | Age: 60
End: 2020-01-24
Payer: COMMERCIAL

## 2020-01-24 VITALS
HEART RATE: 86 BPM | BODY MASS INDEX: 34.49 KG/M2 | DIASTOLIC BLOOD PRESSURE: 88 MMHG | SYSTOLIC BLOOD PRESSURE: 153 MMHG | WEIGHT: 207 LBS | HEIGHT: 65 IN

## 2020-01-24 DIAGNOSIS — G56.22 CUBITAL TUNNEL SYNDROME ON LEFT: Primary | ICD-10-CM

## 2020-01-24 DIAGNOSIS — G56.02 CARPAL TUNNEL SYNDROME OF LEFT WRIST: ICD-10-CM

## 2020-01-24 PROCEDURE — 99244 OFF/OP CNSLTJ NEW/EST MOD 40: CPT | Performed by: ORTHOPAEDIC SURGERY

## 2020-01-24 NOTE — PROGRESS NOTES
Chief Complaint     Left hand numbness and tingling      History of Present Illness     Melina Ham is a 60 y o  female is here for a consultation for her Left wrist  pain referred by her PCP, Dr Nohemi Benjamin  She has had a EMG of the left upper & lower extremities, 19  She has been having numbness and tingling into the left hand since her stroke in 2019  She has numbness into the left side of her trunk  She notes that the pain is intermittent without the day, there is not one particular time in the day when it bothers her most     She has done therapy with [facility] for her hands  She has not tried bracing for the wrist, but uses a compression sleeve which seems to give her some relief  Past Medical History:   Diagnosis Date    Gastritis        Past Surgical History:   Procedure Laterality Date    GALLBLADDER SURGERY         No Known Allergies    Current Outpatient Medications on File Prior to Visit   Medication Sig Dispense Refill    amLODIPine (NORVASC) 2 5 mg tablet Take 1 tablet (2 5 mg total) by mouth daily 30 tablet 5    aspirin 81 mg chewable tablet Chew 1 tablet (81 mg total) daily  0    atorvastatin (LIPITOR) 80 mg tablet Take 0 5 tablets (40 mg total) by mouth daily with dinner 30 tablet 3    ibuprofen (MOTRIN) 200 mg tablet Take 400 mg by mouth every 6 (six) hours as needed for mild pain      pantoprazole (PROTONIX) 40 mg tablet Take 1 tablet (40 mg total) by mouth daily in the early morning 30 tablet 1     No current facility-administered medications on file prior to visit          Social History     Tobacco Use    Smoking status: Former Smoker     Packs/day: 0 25     Last attempt to quit: 2019     Years since quittin 5    Smokeless tobacco: Never Used   Substance Use Topics    Alcohol use: Not Currently    Drug use: Never       Family History   Problem Relation Age of Onset    Uterine cancer Mother     Lupus Sister     Bone cancer Maternal Grandfather Review of Systems     As stated in the HPI  All other systems were reviewed and are negative  Physical Exam     /88   Pulse 86   Ht 5' 5" (1 651 m)   Wt 93 9 kg (207 lb)   BMI 34 45 kg/m²     GENERAL: This is a well-developed, well-nourished, age-appropriate patient in no acute distress  The patient is alert and oriented x3  Pleasant and cooperative  Eyes: Anicteric sclerae  Extraocular movements appear intact  HENT: Nares are patent with no drainage  Lungs: There is equal chest rise on inspection  Breathing is non-labored with no audible wheezing  Cardiovascular: No cyanosis  No upper extremity lymphadema  Skin: Skin is warm to touch  No obvious skin lesions or rashes other than described below  Neurologic: No ataxia  Psychiatric: Mood and affect are appropriate  Full cervical ROM with - Spurling  Full ROM of the elbow  Pain with end range of flexion  + TTP over the lateral epicondyle and into the extensor compartment  + tinel's over the cubital tunnel   Tightness in the flexor pronator mass  - Subluxation of ulnar nerve    Left wrist:    Limited flexion and extension of the wrist   + pain with resisted wrist extension  +Tinels over the median nerve distribution  5/5 FDR  5/5 ABP    Good capillary refill      Data Review     Results Reviewed     None             Imaging:  EMG of the Left upper extremity reviewed from 12/24/19: mild left carpal tunnel syndrome    Assessment and Plan      Diagnoses and all orders for this visit:    Carpal tunnel syndrome of left wrist  -     Ambulatory referral to Orthopedic Surgery         - Conservative treatments were discussed in depth   - Encouraged to change up her sleeping positions to not compress the ulnar nerve at the elbow  - Instructed to use the cubital tunnel splinting at night    - Cock up wrist brace given to be worn at night    We discussed the etiology and natural course of carpal tunnel syndrome    We discussed that carpal tunnel syndrome is related to increased pressure on the nerve in the carpal canal at the level of the wrist   Increasing pressure can be a result of wrist flexion or extension or changes to the contents of the carpal tunnel  We discussed that progression of this condition from mild to severe can result in numbness and tingling as well as dysfunction of the hand and even atrophy and weakness to the thumb musculature  Treatment options for this condition range from nonoperative to operative and the mainstays are nighttime splinting for nonoperative measures and carpal tunnel release for operative measures  Trial of nonoperative measures for around 6 weeks is typically beneficial prior to any surgical intervention and can help to avoid surgery  Surgical release is performed with a mini open approach and while it can be performed with local only or local and sedation, there are risks to the procedure that include bleeding, infection, damage to surrounding neurovascular structures, weakness, pillar pain, and persistence of symptoms  I also discussed with the patient that if carpal tunnel persists in both wrists that surgical intervention can be performed simultaneously and that recovery is expedited  We discussed the etiology and natural course of cubital tunnel syndrome at the elbow  This is related to compression of the ulnar nerve at or around the medial epicondyle or FCU fascia  Compression typically results in numbness to the hand, pain, and occasionally weakness  We discussed that there are nonoperative and operative modalities for treatment and at the majority of patients benefit from non operative modalities  Typically, this involves the night splinting and ergonomic adjustments to prevent direct pressure or compression of the ulnar nerve at the elbow  Surgical treatment can involve in situ decompression or transposition if the nerve is unstable          Follow Up: in 6 weeks     To Do Next Visit: Re-evaluate the conservative treamtent    PROCEDURES PERFORMED:  Procedures  No Procedures performed today     Scribe Attestation    I,:   Kristin Montes am acting as a scribe while in the presence of the attending physician :        I,:   Nell Renner MD personally performed the services described in this documentation    as scribed in my presence :

## 2020-03-09 ENCOUNTER — OFFICE VISIT (OUTPATIENT)
Dept: OBGYN CLINIC | Facility: MEDICAL CENTER | Age: 60
End: 2020-03-09
Payer: COMMERCIAL

## 2020-03-09 VITALS
HEART RATE: 88 BPM | BODY MASS INDEX: 34.16 KG/M2 | WEIGHT: 205 LBS | HEIGHT: 65 IN | SYSTOLIC BLOOD PRESSURE: 146 MMHG | DIASTOLIC BLOOD PRESSURE: 82 MMHG

## 2020-03-09 DIAGNOSIS — M77.12 LATERAL EPICONDYLITIS OF LEFT ELBOW: ICD-10-CM

## 2020-03-09 DIAGNOSIS — G56.22 CUBITAL TUNNEL SYNDROME ON LEFT: Primary | ICD-10-CM

## 2020-03-09 PROCEDURE — 99214 OFFICE O/P EST MOD 30 MIN: CPT | Performed by: ORTHOPAEDIC SURGERY

## 2020-03-09 PROCEDURE — 3079F DIAST BP 80-89 MM HG: CPT | Performed by: ORTHOPAEDIC SURGERY

## 2020-03-09 PROCEDURE — 3077F SYST BP >= 140 MM HG: CPT | Performed by: ORTHOPAEDIC SURGERY

## 2020-03-09 PROCEDURE — 1036F TOBACCO NON-USER: CPT | Performed by: ORTHOPAEDIC SURGERY

## 2020-03-09 NOTE — PROGRESS NOTES
Chief Complaint     Carpal tunnel f/u, left elbow pain  History of the Present Illness     Elmo Alfred is a 60 y o  female presents in follow-up for left carpal tunnel syndrome  The patient has been wearing a cock-up wrist splint both at night and during the day  She states while her wrist pain has improved significantly, she now experiences left elbow pain  She reports dullness to sensation in her 4th and 5th digits that seems to travel from the elbow distally  She denies any new falls or trauma  Past Medical History:   Diagnosis Date    Gastritis        Past Surgical History:   Procedure Laterality Date    GALLBLADDER SURGERY         No Known Allergies    Current Outpatient Medications on File Prior to Visit   Medication Sig Dispense Refill    amLODIPine (NORVASC) 2 5 mg tablet Take 1 tablet (2 5 mg total) by mouth daily 30 tablet 5    aspirin 81 mg chewable tablet Chew 1 tablet (81 mg total) daily  0    atorvastatin (LIPITOR) 80 mg tablet Take 0 5 tablets (40 mg total) by mouth daily with dinner 30 tablet 3    ibuprofen (MOTRIN) 200 mg tablet Take 400 mg by mouth every 6 (six) hours as needed for mild pain      pantoprazole (PROTONIX) 40 mg tablet Take 1 tablet (40 mg total) by mouth daily in the early morning 30 tablet 1     No current facility-administered medications on file prior to visit  Social History     Tobacco Use    Smoking status: Former Smoker     Packs/day: 0 25     Last attempt to quit: 2019     Years since quittin 6    Smokeless tobacco: Never Used   Substance Use Topics    Alcohol use: Not Currently    Drug use: Never       Family History   Problem Relation Age of Onset    Uterine cancer Mother     Lupus Sister     Bone cancer Maternal Grandfather        Review of Systems     As stated in the HPI  All other systems were reviewed and are negative        Physical Exam     /82   Pulse 88   Ht 5' 5" (1 651 m)   Wt 93 kg (205 lb) BMI 34 11 kg/m²     GENERAL: This is a well-developed, well-nourished, age-appropriate patient in no acute distress  The patient is alert and oriented x3  Pleasant and cooperative  Eyes: Anicteric sclerae  Extraocular movements appear intact  HENT: Nares are patent with no drainage  Lungs: There is equal chest rise on inspection  Breathing is non-labored with no audible wheezing  Cardiovascular: No cyanosis  No upper extremity lymphadema  Skin: Skin is warm to touch  No obvious skin lesions or rashes other than described below  Neurologic: No ataxia  Psychiatric: Mood and affect are appropriate  The left upper extremity exam demonstrates skin intact, no significant swelling, no erythema  She has tenderness to palpation over the lateral epicondyle  She also has a positive Tinel's over the ulnar nerve and Guyon's canal   The ulnar nerve does not sublux with elbow flexion  She has a negative Tinel's over the carpal tunnel  She reports decreased sensation in the 4th and 5th digits, and normal sensation in the 1st-3rd digits  She has 5/5 strength with FDS 2-5, 5/5 strength APB  Data Review     Results Reviewed     None         Imaging:  No imaging to review today  Assessment and Plan      Diagnoses and all orders for this visit:    Cubital tunnel syndrome on left    Lateral epicondylitis of left elbow       27-year-old female with two new conditions in the setting of known carpal tunnel syndrome  Her daughter, who is present in the room, assisted in translation and communication with the patient  We discussed the etiology and natural course of cubital tunnel syndrome at the elbow  This is related to compression of the ulnar nerve at or around the medial epicondyle or FCU fascia  Compression typically results in numbness to the hand, pain, and occasionally weakness    We discussed that there are nonoperative and operative modalities for treatment and at the majority of patients benefit from non operative modalities  Typically, this involves the night splinting and ergonomic adjustments to prevent direct pressure or compression of the ulnar nerve at the elbow  Surgical treatment can involve in situ decompression or transposition if the nerve is unstable  We discussed the natural course of this disease and how it is likely to get better on its own over time, though this may take up to 1-2 years  I have discussed that there are other management opportunities including therapy and wrist bracing that may expedite this process  Wrist bracing during activities, education on  ergonomics, specifically with an avoidance of palm down gripping, and hand therapy for stretching more than strengthening of the forearm musculature will be beneficial   Typically, there is improvement with these modalities in the 1st 6 weeks after initiation of treatment  We have discussed that the role of corticosteroid injection has changed with recent research showing that it may exacerbate symptoms in the long term, and so I only reserve injections for those who are refractory to any treatment for about a 3 month period or more  Surgical intervention utilizing open or arthroscopic procedures is reserved for the select few who do not get any better over a long period of time  Patient instructed on towel splinting at night  Continued wrist splinting at night      Follow Up:  6 weeks    To Do Next Visit:     PROCEDURES PERFORMED:  Procedures  No Procedures performed today

## 2020-03-25 ENCOUNTER — TELEPHONE (OUTPATIENT)
Dept: NEUROLOGY | Facility: CLINIC | Age: 60
End: 2020-03-25

## 2020-03-25 NOTE — TELEPHONE ENCOUNTER
Lmom for return call regarding patients upcoming appt on 03/30/2020 insurance in [facility] is inactive and medicaid coverage is only for behavior health benefits not medical coverage requested call back for update on patient's insurance

## 2020-03-30 ENCOUNTER — TELEPHONE (OUTPATIENT)
Dept: NEUROLOGY | Facility: CLINIC | Age: 60
End: 2020-03-30

## 2020-03-30 ENCOUNTER — TELEMEDICINE (OUTPATIENT)
Dept: NEUROLOGY | Facility: CLINIC | Age: 60
End: 2020-03-30

## 2020-03-30 DIAGNOSIS — I63.9 THALAMIC STROKE (HCC): ICD-10-CM

## 2020-03-30 DIAGNOSIS — R51.9 NONINTRACTABLE HEADACHE, UNSPECIFIED CHRONICITY PATTERN, UNSPECIFIED HEADACHE TYPE: ICD-10-CM

## 2020-03-30 DIAGNOSIS — G56.02 CARPAL TUNNEL SYNDROME OF LEFT WRIST: ICD-10-CM

## 2020-03-30 DIAGNOSIS — I63.9 CVA (CEREBRAL VASCULAR ACCIDENT) (HCC): ICD-10-CM

## 2020-03-30 DIAGNOSIS — Z86.73 HISTORY OF CVA (CEREBROVASCULAR ACCIDENT): Primary | ICD-10-CM

## 2020-03-30 PROCEDURE — 99214 OFFICE O/P EST MOD 30 MIN: CPT | Performed by: PSYCHIATRY & NEUROLOGY

## 2020-03-30 RX ORDER — GABAPENTIN 300 MG/1
300 CAPSULE ORAL
Qty: 30 CAPSULE | Refills: 1 | Status: SHIPPED | OUTPATIENT
Start: 2020-03-30 | End: 2020-04-24

## 2020-03-30 RX ORDER — ATORVASTATIN CALCIUM 80 MG/1
40 TABLET, FILM COATED ORAL
Qty: 30 TABLET | Refills: 3 | Status: SHIPPED | OUTPATIENT
Start: 2020-03-30 | End: 2021-05-04

## 2020-03-30 NOTE — TELEPHONE ENCOUNTER
Spoke with patient Daughter Romeo Duane and she and the patient understand and agree to the video visit with Dr pearl today  I also informed them both that this is a billable service   [address] [place] [address] Drive

## 2020-03-30 NOTE — PROGRESS NOTES
Virtual Regular Visit    Problem List Items Addressed This Visit        Cardiovascular and Mediastinum    CVA (cerebral vascular accident) ([facility] [address] )  -c/w with combination of aspirin and atorvastatin for secondary stroke prevention  -Defer to PCP regarding DM and BP management  -does not smoke at this time   -denies any history of snoring    -I advised patient to avoid using NSAIDs for headaches or other pain  -Recommend mediterranean diet & regular exercise regimen atleast 4-5 times a week for 20-30 minutes  -I educated patient/family regarding medication compliance      Relevant Medications    atorvastatin (LIPITOR) 80 mg tablet       Nervous and Auditory    Thalamic stroke (HCC)    Carpal tunnel syndrome of left wrist  -EMG/NCV confirmed Left carpal tunnel syndrome  -will start gabapentin 300mg at bedtime  -saw ortho and was not recommended surgery  Relevant Medications    gabapentin (NEURONTIN) 300 mg capsule       Other    History of CVA (cerebrovascular accident) - Primary    Relevant Medications    atorvastatin (LIPITOR) 80 mg tablet    Nonintractable headache  -will need to rule out ICP headache  -patient having headache while laughing  Relevant Orders    CT head wo contrast         I would like to follow up in 3 months  I would be happy to see the patient sooner if any new questions/concerns arise  Patient/Guardian was advised to the call the office if they have any questions and concerns in the meantime  Patient/Guardian does understand that if they have any new stroke like symptoms such as facial droop on one side, weakness/paralysis on either side, speech trouble, numbness on one side, balance issues, any vision changes, extreme dizziness or any new headache, to call 9-1-1 immediately or to proceed to the nearest ER immediately          Reason for visit is Stroke    Encounter provider Eleanor Toney MD    Provider located at [address] [facility] [address] [facility] [id number]      Recent Visits  Date Type Provider Dept   03/25/20 Telephone [id number] Nader Perry recent visits within past 7 days and meeting all other requirements     Today's Visits  Date Type Provider Dept   03/30/20 Telephone Nikhil Du MA [facility]   03/30/20 Terry Devlin MD [facility]   Showing today's visits and meeting all other requirements     Future Appointments  Date Type Provider Dept   03/30/20 Telemedicine Miriam Isabel MD [facility]   03/30/20 Telephone Nikhil Du MA [facility]   Showing future appointments within next 150 days and meeting all other requirements       The patient was identified by name and date of birth  Jennifer Powell was informed that this is a telemedicine visit and that the visit is being conducted through [employer]  My office door was closed  No one else was in the room  She acknowledged consent and understanding of privacy and security of the video platform  The patient has agreed to participate and understands they can discontinue the visit at any time  Patient is aware this is a billable service  Subjective  Jennifer Powell is a 60 y o  female who is a 6 month follow-up for right thalamic stroke  Patient is doing well overall except she does have some minor symptoms  She continues to have tingling and numbness on the left side of her body  He was also recently diagnosed with carpal tunnel syndrome the left side which seems to be bothering her mostly at nighttime and is hindering her sleep  She is compliant with her medications  She is compliant with our aspirin and Lipitor  She is out of her Lipitor medications so she would like some new refills today  They did end up seeing orthopedic surgeon for left carpal syndrome was recommended no surgery or other interventions    She does wear the wrist splint at night which does seem to help sometimes but sometimes does not  Patient is doing well  She denies any new TIA/CVA like symptoms  She is complaint with her medications  She says that       Past Medical History:   Diagnosis Date    Gastritis        Past Surgical History:   Procedure Laterality Date    GALLBLADDER SURGERY  2009       Current Outpatient Medications   Medication Sig Dispense Refill    amLODIPine (NORVASC) 2 5 mg tablet Take 1 tablet (2 5 mg total) by mouth daily 30 tablet 5    aspirin 81 mg chewable tablet Chew 1 tablet (81 mg total) daily  0    atorvastatin (LIPITOR) 80 mg tablet Take 0 5 tablets (40 mg total) by mouth daily with dinner 30 tablet 3    ibuprofen (MOTRIN) 200 mg tablet Take 400 mg by mouth every 6 (six) hours as needed for mild pain      pantoprazole (PROTONIX) 40 mg tablet Take 1 tablet (40 mg total) by mouth daily in the early morning 30 tablet 1    gabapentin (NEURONTIN) 300 mg capsule Take 1 capsule (300 mg total) by mouth daily at bedtime 30 capsule 1     No current facility-administered medications for this visit  No Known Allergies    Review of Systems   Constitutional: Negative  Negative for appetite change and fever  HENT: Negative  Negative for hearing loss, tinnitus, trouble swallowing and voice change  Eyes: Negative  Negative for photophobia and pain  Respiratory: Negative  Negative for shortness of breath  Cardiovascular: Negative  Negative for palpitations  Gastrointestinal: Negative  Negative for nausea and vomiting  Endocrine: Negative  Negative for cold intolerance  Genitourinary: Negative  Negative for dysuria, frequency and urgency  Musculoskeletal: Positive for gait problem  Negative for myalgias and neck pain  Patient family states that her balance is off  Skin: Negative  Negative for rash  Neurological: Positive for weakness (Left side), numbness and headaches   Negative for dizziness, tremors, seizures, syncope, facial asymmetry, speech difficulty and light-headedness  Patient family states that she has left side weakness and numbness and tingling  Patient Family also stated when she laugh a lot she began to have headaches  Hematological: Negative  Does not bruise/bleed easily  Psychiatric/Behavioral: Negative  Negative for confusion, hallucinations and sleep disturbance  Physical Exam   She is alert, awake, follows commands, able to spell world backwards  General - not in any distress at this time  HEENT - normocephalic, atraumatic  Chest- non-labored breathing noted  Neuro  Cranial nerves- EOMI normal, smile symmetric, tongue midline, shoulder shrug symmetric  General - alert, awake, follows commands  Motor - moves all 4 extremities spontaneously, is able to squeeze daughters hand when asked to, and she states that she has mild weakness on the L hand  Gait - normal     I spent 40 minutes with the patient during this visit

## 2020-04-13 ENCOUNTER — TELEPHONE (OUTPATIENT)
Dept: FAMILY MEDICINE CLINIC | Facility: CLINIC | Age: 60
End: 2020-04-13

## 2020-04-23 DIAGNOSIS — K21.9 GASTROESOPHAGEAL REFLUX DISEASE, ESOPHAGITIS PRESENCE NOT SPECIFIED: Primary | ICD-10-CM

## 2020-04-24 DIAGNOSIS — G56.02 CARPAL TUNNEL SYNDROME OF LEFT WRIST: ICD-10-CM

## 2020-04-24 RX ORDER — GABAPENTIN 300 MG/1
CAPSULE ORAL
Qty: 30 CAPSULE | Refills: 1 | Status: SHIPPED | OUTPATIENT
Start: 2020-04-24 | End: 2020-05-18

## 2020-04-24 RX ORDER — OMEPRAZOLE 40 MG/1
40 CAPSULE, DELAYED RELEASE ORAL DAILY
Qty: 90 CAPSULE | Refills: 0 | Status: SHIPPED | OUTPATIENT
Start: 2020-04-24 | End: 2020-08-27 | Stop reason: SDUPTHER

## 2020-04-30 ENCOUNTER — HOSPITAL ENCOUNTER (OUTPATIENT)
Dept: CT IMAGING | Facility: HOSPITAL | Age: 60
Discharge: HOME | End: 2020-04-30
Attending: PSYCHIATRY & NEUROLOGY
Payer: COMMERCIAL

## 2020-04-30 DIAGNOSIS — R51.9 NONINTRACTABLE HEADACHE, UNSPECIFIED CHRONICITY PATTERN, UNSPECIFIED HEADACHE TYPE: ICD-10-CM

## 2020-04-30 PROCEDURE — 70450 CT HEAD/BRAIN W/O DYE: CPT

## 2020-05-01 ENCOUNTER — TRANSCRIBE ORDERS (OUTPATIENT)
Dept: PHYSICAL THERAPY | Facility: REHABILITATION | Age: 60
End: 2020-05-01

## 2020-05-01 DIAGNOSIS — I63.9 CEREBROVASCULAR ACCIDENT (CVA), UNSPECIFIED MECHANISM (HCC): Primary | ICD-10-CM

## 2020-05-16 DIAGNOSIS — G56.02 CARPAL TUNNEL SYNDROME OF LEFT WRIST: ICD-10-CM

## 2020-05-18 RX ORDER — GABAPENTIN 300 MG/1
CAPSULE ORAL
Qty: 30 CAPSULE | Refills: 1 | Status: SHIPPED | OUTPATIENT
Start: 2020-05-18 | End: 2020-06-18

## 2020-06-18 DIAGNOSIS — G56.02 CARPAL TUNNEL SYNDROME OF LEFT WRIST: ICD-10-CM

## 2020-06-18 RX ORDER — GABAPENTIN 300 MG/1
CAPSULE ORAL
Qty: 30 CAPSULE | Refills: 1 | Status: SHIPPED | OUTPATIENT
Start: 2020-06-18 | End: 2020-07-22

## 2020-07-22 DIAGNOSIS — G56.02 CARPAL TUNNEL SYNDROME OF LEFT WRIST: ICD-10-CM

## 2020-07-22 RX ORDER — GABAPENTIN 300 MG/1
CAPSULE ORAL
Qty: 30 CAPSULE | Refills: 1 | Status: SHIPPED | OUTPATIENT
Start: 2020-07-22 | End: 2020-07-24 | Stop reason: CLARIF

## 2020-07-24 ENCOUNTER — OFFICE VISIT (OUTPATIENT)
Dept: NEUROLOGY | Facility: CLINIC | Age: 60
End: 2020-07-24
Payer: COMMERCIAL

## 2020-07-24 VITALS
TEMPERATURE: 96.8 F | WEIGHT: 209 LBS | DIASTOLIC BLOOD PRESSURE: 68 MMHG | SYSTOLIC BLOOD PRESSURE: 141 MMHG | BODY MASS INDEX: 34.82 KG/M2 | HEART RATE: 93 BPM | HEIGHT: 65 IN

## 2020-07-24 DIAGNOSIS — G56.02 CARPAL TUNNEL SYNDROME OF LEFT WRIST: ICD-10-CM

## 2020-07-24 DIAGNOSIS — R20.0 NUMBNESS AND TINGLING OF LEFT UPPER AND LOWER EXTREMITY: Primary | ICD-10-CM

## 2020-07-24 DIAGNOSIS — I63.9 THALAMIC STROKE (HCC): ICD-10-CM

## 2020-07-24 DIAGNOSIS — R20.2 NUMBNESS AND TINGLING OF LEFT UPPER AND LOWER EXTREMITY: Primary | ICD-10-CM

## 2020-07-24 PROCEDURE — 1036F TOBACCO NON-USER: CPT | Performed by: PSYCHIATRY & NEUROLOGY

## 2020-07-24 PROCEDURE — 3008F BODY MASS INDEX DOCD: CPT | Performed by: PSYCHIATRY & NEUROLOGY

## 2020-07-24 PROCEDURE — 3077F SYST BP >= 140 MM HG: CPT | Performed by: PSYCHIATRY & NEUROLOGY

## 2020-07-24 PROCEDURE — 3078F DIAST BP <80 MM HG: CPT | Performed by: PSYCHIATRY & NEUROLOGY

## 2020-07-24 PROCEDURE — 99214 OFFICE O/P EST MOD 30 MIN: CPT | Performed by: PSYCHIATRY & NEUROLOGY

## 2020-07-24 RX ORDER — AMITRIPTYLINE HYDROCHLORIDE 25 MG/1
25 TABLET, FILM COATED ORAL
Qty: 30 TABLET | Refills: 3 | Status: SHIPPED | OUTPATIENT
Start: 2020-07-24 | End: 2020-10-21

## 2020-07-24 NOTE — PROGRESS NOTES
Patient ID: Wendi Pathak is a 60 y o  female  Assessment/Plan: This is a 59 y/o Female who is here as a follow up for history of right thalamic stroke  Patient does have tingling/numbness on the L side of the body  PLAN:      Diagnoses and all orders for this visit:    Thalamic stroke ([facility] [address] )  -c/w with combination of aspirin and atorvastatin for secondary stroke prevention  -BP goal < 130/80, BP is at goal    -Defer to PCP regarding DM and BP management  -does not smoke at this time   -denies any history of snoring    -I advised patient to avoid using NSAIDs for headaches or other pain  -Recommend mediterranean diet & regular exercise regimen atleast 4-5 times a week for 20-30 minutes  -I educated patient/family regarding medication compliance    -     amitriptyline (ELAVIL) 25 mg tablet; Take 1 tablet (25 mg total) by mouth daily at bedtime    Carpal tunnel syndrome of left wrist  -was seen by orthopedic surgery and was not recommended surgery  -continue using wrist splint    -she still has symptoms, and stopped taking gabapentin because it made her feel "weird" she states  -will start her on amitriptyline 25mg PO qhS both for carpal tunnel syndrome and for numbness and tingling of LUE and LLE  -     amitriptyline (ELAVIL) 25 mg tablet; Take 1 tablet (25 mg total) by mouth daily at bedtime    Numbness and tingling of left upper and lower extremity  -likely 2/2 right thalamic stroke  -c/w amitriptyline 25mg PO QHS       I would like to follow up in 6 MONTHS  I would be happy to see the patient sooner if any new questions/concerns arise  Patient/Guardian was advised to the call the office if they have any questions and concerns in the meantime       Patient/Guardian does understand that if they have any new stroke like symptoms such as facial droop on one side, weakness/paralysis on either side, speech trouble, numbness on one side, balance issues, any vision changes, extreme dizziness or any new headache, to call 9-1-1 immediately or to proceed to the nearest ER immediately  Subjective:    HPI    This is a 59 y/o Female who is here as a follow up for history of R thalamic stroke  Patient does have numbness and heaviness on the left side of the body  She still have numbness on the L side of her body, and also has Left hand tingling/numbness  She does have a wrist splint on the left hand which does help with the symptoms  She says that she is on aspirin and atorvastatin for stroke prevention  No new TIA/CVA like symptoms  She says that when she is too excited, she might feel like passing out  She had CT head which was negative for any acute findings which I personally reviewed the images of  The following portions of the patient's history were reviewed and updated as appropriate:   She  has a past medical history of Gastritis  She   Patient Active Problem List    Diagnosis Date Noted    Numbness and tingling of left upper and lower extremity 07/24/2020    Nonintractable headache 03/30/2020    Carpal tunnel syndrome of left wrist 03/30/2020    Polyneuropathy, peripheral sensorimotor axonal     History of CVA (cerebrovascular accident) 08/23/2019    Tobacco abuse, in remission 08/23/2019    Thalamic stroke ([facility] [address] ) 08/23/2019    CVA (cerebral vascular accident) ([facility] [address] ) 06/29/2019     She  has a past surgical history that includes Gallbladder surgery (2009)  Her family history includes Bone cancer in her maternal grandfather; Lupus in her sister; Uterine cancer in her mother  She  reports that she quit smoking about 12 months ago  She smoked 0 25 packs per day  She has never used smokeless tobacco  She reports that she drank alcohol  She reports that she does not use drugs    Current Outpatient Medications   Medication Sig Dispense Refill    amLODIPine (NORVASC) 2 5 mg tablet Take 1 tablet (2 5 mg total) by mouth daily 30 tablet 5    aspirin 81 mg chewable tablet Chew 1 tablet (81 mg total) daily  0    atorvastatin (LIPITOR) 80 mg tablet Take 0 5 tablets (40 mg total) by mouth daily with dinner 30 tablet 3    ibuprofen (MOTRIN) 200 mg tablet Take 400 mg by mouth every 6 (six) hours as needed for mild pain      omeprazole (PriLOSEC) 40 MG capsule Take 1 capsule (40 mg total) by mouth daily 90 capsule 0    amitriptyline (ELAVIL) 25 mg tablet Take 1 tablet (25 mg total) by mouth daily at bedtime 30 tablet 3     No current facility-administered medications for this visit  Current Outpatient Medications on File Prior to Visit   Medication Sig    amLODIPine (NORVASC) 2 5 mg tablet Take 1 tablet (2 5 mg total) by mouth daily    aspirin 81 mg chewable tablet Chew 1 tablet (81 mg total) daily    atorvastatin (LIPITOR) 80 mg tablet Take 0 5 tablets (40 mg total) by mouth daily with dinner    ibuprofen (MOTRIN) 200 mg tablet Take 400 mg by mouth every 6 (six) hours as needed for mild pain    omeprazole (PriLOSEC) 40 MG capsule Take 1 capsule (40 mg total) by mouth daily    [DISCONTINUED] gabapentin (NEURONTIN) 300 mg capsule TAKE 1 CAPSULE BY MOUTH EVERYDAY AT BEDTIME (Patient not taking: Reported on 7/24/2020)     No current facility-administered medications on file prior to visit  She has No Known Allergies            Objective:    Blood pressure 141/68, pulse 93, temperature (!) 96 8 °F (36 °C), temperature source Tympanic, height 5' 5" (1 651 m), weight 94 8 kg (209 lb)  Physical Exam  General - Patient is alert, awake, oriented to time, place and person, follows commands  Speech - no dysarthria noted, no aphasia noted  Neck - supple    Neuro:   Cranial nerves: PERRL, EOMI, facial sensation intact, able to raise eyebrows symmetrically, cannot assess facial droop and tongue deviation due to face mask requirement  Motor: 5/5 throughout, normal tone, no pronator drift noted     Sensory - decreased sensation to soft touch on the l side of body  Coordination - no ataxia/dysmetria noted  Gait - normal tandem walk without   Neurological Exam      ROS:  I reviewed ROS   Review of Systems   Constitutional: Negative  Negative for appetite change and fever  HENT: Negative  Negative for hearing loss, tinnitus, trouble swallowing and voice change  Eyes: Negative  Negative for photophobia and pain  Respiratory: Negative  Negative for shortness of breath  Cardiovascular: Negative  Negative for palpitations  Gastrointestinal: Negative  Negative for nausea and vomiting  Endocrine: Negative  Negative for cold intolerance  Genitourinary: Negative  Negative for dysuria, frequency and urgency  Musculoskeletal: Negative  Negative for myalgias and neck pain  Skin: Negative  Negative for rash  Neurological: Positive for numbness  Negative for dizziness, tremors, seizures, syncope, facial asymmetry, speech difficulty, weakness, light-headedness and headaches  Patient stated that she has left side numbness and pain  Hematological: Negative  Does not bruise/bleed easily  Psychiatric/Behavioral: Negative  Negative for confusion, hallucinations and sleep disturbance

## 2020-07-29 DIAGNOSIS — K21.9 GASTROESOPHAGEAL REFLUX DISEASE, ESOPHAGITIS PRESENCE NOT SPECIFIED: ICD-10-CM

## 2020-08-03 RX ORDER — OMEPRAZOLE 40 MG/1
CAPSULE, DELAYED RELEASE ORAL
Qty: 90 CAPSULE | Refills: 0 | OUTPATIENT
Start: 2020-08-03

## 2020-08-27 DIAGNOSIS — K21.9 GASTROESOPHAGEAL REFLUX DISEASE, ESOPHAGITIS PRESENCE NOT SPECIFIED: ICD-10-CM

## 2020-08-27 RX ORDER — OMEPRAZOLE 40 MG/1
40 CAPSULE, DELAYED RELEASE ORAL DAILY
Qty: 90 CAPSULE | Refills: 0 | Status: SHIPPED | OUTPATIENT
Start: 2020-08-27 | End: 2020-09-01 | Stop reason: SDUPTHER

## 2020-08-27 NOTE — TELEPHONE ENCOUNTER
Patient's daughter called to request refill of Omeprazole to be sent to [facility] pharmacy  Madhavi Minor was told patient is due for a visit, she scheduled an appointment for 09/01/20

## 2020-08-28 ENCOUNTER — TRANSCRIBE ORDERS (OUTPATIENT)
Dept: ADMINISTRATIVE | Facility: HOSPITAL | Age: 60
End: 2020-08-28

## 2020-08-28 DIAGNOSIS — Z12.31 ENCOUNTER FOR SCREENING MAMMOGRAM FOR MALIGNANT NEOPLASM OF BREAST: Primary | ICD-10-CM

## 2020-09-01 ENCOUNTER — OFFICE VISIT (OUTPATIENT)
Dept: FAMILY MEDICINE CLINIC | Facility: CLINIC | Age: 60
End: 2020-09-01
Payer: COMMERCIAL

## 2020-09-01 VITALS
TEMPERATURE: 98.4 F | SYSTOLIC BLOOD PRESSURE: 138 MMHG | WEIGHT: 210.2 LBS | DIASTOLIC BLOOD PRESSURE: 76 MMHG | HEART RATE: 103 BPM | BODY MASS INDEX: 35.02 KG/M2 | OXYGEN SATURATION: 98 % | HEIGHT: 65 IN

## 2020-09-01 DIAGNOSIS — K21.9 GASTROESOPHAGEAL REFLUX DISEASE, ESOPHAGITIS PRESENCE NOT SPECIFIED: ICD-10-CM

## 2020-09-01 DIAGNOSIS — S23.41XA RIB SPRAIN, INITIAL ENCOUNTER: ICD-10-CM

## 2020-09-01 DIAGNOSIS — E55.9 VITAMIN D DEFICIENCY: ICD-10-CM

## 2020-09-01 DIAGNOSIS — G56.02 CARPAL TUNNEL SYNDROME OF LEFT WRIST: ICD-10-CM

## 2020-09-01 DIAGNOSIS — I63.9 CEREBROVASCULAR ACCIDENT (CVA), UNSPECIFIED MECHANISM (HCC): ICD-10-CM

## 2020-09-01 DIAGNOSIS — E78.00 HYPERCHOLESTEREMIA: ICD-10-CM

## 2020-09-01 DIAGNOSIS — I63.9 THALAMIC STROKE (HCC): Primary | ICD-10-CM

## 2020-09-01 PROCEDURE — 3725F SCREEN DEPRESSION PERFORMED: CPT | Performed by: FAMILY MEDICINE

## 2020-09-01 PROCEDURE — 3078F DIAST BP <80 MM HG: CPT | Performed by: FAMILY MEDICINE

## 2020-09-01 PROCEDURE — 3075F SYST BP GE 130 - 139MM HG: CPT | Performed by: FAMILY MEDICINE

## 2020-09-01 PROCEDURE — 99214 OFFICE O/P EST MOD 30 MIN: CPT | Performed by: FAMILY MEDICINE

## 2020-09-01 PROCEDURE — 1036F TOBACCO NON-USER: CPT | Performed by: FAMILY MEDICINE

## 2020-09-01 RX ORDER — OMEPRAZOLE 40 MG/1
40 CAPSULE, DELAYED RELEASE ORAL DAILY
Qty: 90 CAPSULE | Refills: 0 | Status: SHIPPED | OUTPATIENT
Start: 2020-09-01 | End: 2020-12-04 | Stop reason: SDUPTHER

## 2020-09-01 NOTE — PROGRESS NOTES
Chief Complaint   Patient presents with    Follow-up     refills    Flank Pain     on left side       Assessment/Plan:Diet changes:  Need fruits and vegetables of all kinds  BMI Counseling: Body mass index is 34 98 kg/m²  The BMI is above normal  Nutrition recommendations include reducing portion sizes, consuming healthier snacks, decreasing soda and/or juice intake, moderation in carbohydrate intake and increasing intake of lean protein  PHQ-9 Depression Screening    PHQ-9:    Frequency of the following problems over the past two weeks:       Little interest or pleasure in doing things:  0 - not at all  Feeling down, depressed, or hopeless:  0 - not at all  PHQ-2 Score:  0          Diagnoses and all orders for this visit:    Thalamic stroke [facility])    Cerebrovascular accident (CVA), unspecified mechanism ([facility] [address] )    Carpal tunnel syndrome of left wrist    Rib sprain, initial encounter  -     XR chest pa & lateral; Future    Gastroesophageal reflux disease, esophagitis presence not specified  -     omeprazole (PriLOSEC) 40 MG capsule; Take 1 capsule (40 mg total) by mouth daily    Vitamin D deficiency  -     Vitamin D 25 hydroxy; Future    Hypercholesteremia  -     Basic metabolic panel; Future  -     CBC and Platelet; Future  -     Hepatic function panel; Future  -     Lipid panel; Future          Subjective:      Patient ID: Stephanie Medrano is a 60 y o  female  Refills  Gets left later rib pains since stroke and if leans that side pain relieved        The following portions of the patient's history were reviewed and updated as appropriate: allergies, current medications, past medical history, past social history, past surgical history and problem list   I have spent 25 minutes with Patient and family today in which greater than 50% of this time was spent in counseling/coordination of care regarding Diagnostic results, Risks and benefits of tx options, Importance of tx compliance, Risk factor reductions and Impressions  Review of Systems   Constitutional: Negative  HENT: Negative  Eyes: Negative  Respiratory: Negative  Cardiovascular: Negative  Gastrointestinal: Negative  Genitourinary: Negative  Musculoskeletal: Negative  Skin: Negative  Neurological: Negative  Psychiatric/Behavioral: Negative  Objective:      /76 (BP Location: Left arm, Patient Position: Sitting, Cuff Size: Large)   Pulse 103   Temp 98 4 °F (36 9 °C) (Tympanic)   Ht 5' 5" (1 651 m)   Wt 95 3 kg (210 lb 3 2 oz)   SpO2 98%   BMI 34 98 kg/m²       Current Outpatient Medications:     amitriptyline (ELAVIL) 25 mg tablet, Take 1 tablet (25 mg total) by mouth daily at bedtime, Disp: 30 tablet, Rfl: 3    amLODIPine (NORVASC) 2 5 mg tablet, Take 1 tablet (2 5 mg total) by mouth daily, Disp: 30 tablet, Rfl: 5    aspirin 81 mg chewable tablet, Chew 1 tablet (81 mg total) daily, Disp: , Rfl: 0    atorvastatin (LIPITOR) 80 mg tablet, Take 0 5 tablets (40 mg total) by mouth daily with dinner, Disp: 30 tablet, Rfl: 3    omeprazole (PriLOSEC) 40 MG capsule, Take 1 capsule (40 mg total) by mouth daily, Disp: 90 capsule, Rfl: 0    ibuprofen (MOTRIN) 200 mg tablet, Take 400 mg by mouth every 6 (six) hours as needed for mild pain, Disp: , Rfl:      Physical Exam  Constitutional:       Appearance: She is well-developed  HENT:      Head: Normocephalic and atraumatic  Right Ear: External ear normal       Left Ear: External ear normal       Nose: Nose normal    Eyes:      Conjunctiva/sclera: Conjunctivae normal       Pupils: Pupils are equal, round, and reactive to light  Neck:      Musculoskeletal: Normal range of motion and neck supple  Cardiovascular:      Rate and Rhythm: Normal rate and regular rhythm  Heart sounds: Normal heart sounds  Pulmonary:      Effort: Pulmonary effort is normal       Breath sounds: Normal breath sounds     Abdominal:      General: Bowel sounds are normal  Palpations: Abdomen is soft  Musculoskeletal:      Comments: Tender lateral inferior ribs  Skin:     General: Skin is warm and dry  Neurological:      Mental Status: She is alert and oriented to person, place, and time  Deep Tendon Reflexes: Reflexes are normal and symmetric  Psychiatric:         Behavior: Behavior normal          Thought Content:  Thought content normal          Judgment: Judgment normal

## 2020-09-18 DIAGNOSIS — I10 ESSENTIAL HYPERTENSION: ICD-10-CM

## 2020-09-21 RX ORDER — AMLODIPINE BESYLATE 2.5 MG/1
TABLET ORAL
Qty: 90 TABLET | Refills: 1 | OUTPATIENT
Start: 2020-09-21

## 2020-10-13 ENCOUNTER — LAB (OUTPATIENT)
Dept: LAB | Age: 60
End: 2020-10-13
Payer: COMMERCIAL

## 2020-10-13 DIAGNOSIS — E55.9 VITAMIN D DEFICIENCY: ICD-10-CM

## 2020-10-13 DIAGNOSIS — E78.00 HYPERCHOLESTEREMIA: ICD-10-CM

## 2020-10-13 LAB
25(OH)D3 SERPL-MCNC: 12.7 NG/ML (ref 30–100)
ALBUMIN SERPL BCP-MCNC: 3.5 G/DL (ref 3.5–5)
ALP SERPL-CCNC: 124 U/L (ref 46–116)
ALT SERPL W P-5'-P-CCNC: 33 U/L (ref 12–78)
ANION GAP SERPL CALCULATED.3IONS-SCNC: 3 MMOL/L (ref 4–13)
AST SERPL W P-5'-P-CCNC: 16 U/L (ref 5–45)
BILIRUB DIRECT SERPL-MCNC: 0.11 MG/DL (ref 0–0.2)
BILIRUB SERPL-MCNC: 0.4 MG/DL (ref 0.2–1)
BUN SERPL-MCNC: 15 MG/DL (ref 5–25)
CALCIUM SERPL-MCNC: 8.9 MG/DL (ref 8.3–10.1)
CHLORIDE SERPL-SCNC: 109 MMOL/L (ref 100–108)
CHOLEST SERPL-MCNC: 190 MG/DL (ref 50–200)
CO2 SERPL-SCNC: 29 MMOL/L (ref 21–32)
CREAT SERPL-MCNC: 0.74 MG/DL (ref 0.6–1.3)
ERYTHROCYTE [DISTWIDTH] IN BLOOD BY AUTOMATED COUNT: 13.5 % (ref 11.6–15.1)
GFR SERPL CREATININE-BSD FRML MDRD: 88 ML/MIN/1.73SQ M
GLUCOSE P FAST SERPL-MCNC: 122 MG/DL (ref 65–99)
HCT VFR BLD AUTO: 38.3 % (ref 34.8–46.1)
HDLC SERPL-MCNC: 44 MG/DL
HGB BLD-MCNC: 11.9 G/DL (ref 11.5–15.4)
LDLC SERPL CALC-MCNC: 110 MG/DL (ref 0–100)
MCH RBC QN AUTO: 28.1 PG (ref 26.8–34.3)
MCHC RBC AUTO-ENTMCNC: 31.1 G/DL (ref 31.4–37.4)
MCV RBC AUTO: 90 FL (ref 82–98)
NONHDLC SERPL-MCNC: 146 MG/DL
PLATELET # BLD AUTO: 372 THOUSANDS/UL (ref 149–390)
PMV BLD AUTO: 9.3 FL (ref 8.9–12.7)
POTASSIUM SERPL-SCNC: 4.6 MMOL/L (ref 3.5–5.3)
PROT SERPL-MCNC: 7.6 G/DL (ref 6.4–8.2)
RBC # BLD AUTO: 4.24 MILLION/UL (ref 3.81–5.12)
SODIUM SERPL-SCNC: 141 MMOL/L (ref 136–145)
TRIGL SERPL-MCNC: 179 MG/DL
WBC # BLD AUTO: 10.9 THOUSAND/UL (ref 4.31–10.16)

## 2020-10-13 PROCEDURE — 82306 VITAMIN D 25 HYDROXY: CPT

## 2020-10-13 PROCEDURE — 36415 COLL VENOUS BLD VENIPUNCTURE: CPT

## 2020-10-13 PROCEDURE — 80061 LIPID PANEL: CPT

## 2020-10-13 PROCEDURE — 80076 HEPATIC FUNCTION PANEL: CPT

## 2020-10-13 PROCEDURE — 80048 BASIC METABOLIC PNL TOTAL CA: CPT

## 2020-10-13 PROCEDURE — 85027 COMPLETE CBC AUTOMATED: CPT

## 2020-10-21 DIAGNOSIS — G56.02 CARPAL TUNNEL SYNDROME OF LEFT WRIST: ICD-10-CM

## 2020-10-21 DIAGNOSIS — I63.9 THALAMIC STROKE (HCC): ICD-10-CM

## 2020-10-21 RX ORDER — AMITRIPTYLINE HYDROCHLORIDE 25 MG/1
TABLET, FILM COATED ORAL
Qty: 90 TABLET | Refills: 1 | Status: SHIPPED | OUTPATIENT
Start: 2020-10-21 | End: 2021-04-30

## 2020-10-28 DIAGNOSIS — I10 ESSENTIAL HYPERTENSION: ICD-10-CM

## 2020-10-28 RX ORDER — AMLODIPINE BESYLATE 2.5 MG/1
2.5 TABLET ORAL DAILY
Qty: 30 TABLET | Refills: 5 | Status: SHIPPED | OUTPATIENT
Start: 2020-10-28 | End: 2021-05-11 | Stop reason: SDUPTHER

## 2020-11-06 ENCOUNTER — HOSPITAL ENCOUNTER (OUTPATIENT)
Dept: MAMMOGRAPHY | Facility: CLINIC | Age: 60
Discharge: HOME | End: 2020-11-06
Payer: COMMERCIAL

## 2020-11-06 ENCOUNTER — TRANSCRIBE ORDERS (OUTPATIENT)
Dept: ADMINISTRATIVE | Facility: HOSPITAL | Age: 60
End: 2020-11-06

## 2020-11-06 VITALS — WEIGHT: 210 LBS | HEIGHT: 65 IN | BODY MASS INDEX: 34.99 KG/M2

## 2020-11-06 DIAGNOSIS — Z12.31 ENCOUNTER FOR SCREENING MAMMOGRAM FOR MALIGNANT NEOPLASM OF BREAST: ICD-10-CM

## 2020-11-06 PROCEDURE — 77063 BREAST TOMOSYNTHESIS BI: CPT

## 2020-11-06 PROCEDURE — 77067 SCR MAMMO BI INCL CAD: CPT

## 2020-11-16 ENCOUNTER — APPOINTMENT (OUTPATIENT)
Dept: RADIOLOGY | Age: 60
End: 2020-11-16
Payer: COMMERCIAL

## 2020-11-16 DIAGNOSIS — S23.41XA RIB SPRAIN, INITIAL ENCOUNTER: ICD-10-CM

## 2020-11-16 PROCEDURE — 71046 X-RAY EXAM CHEST 2 VIEWS: CPT

## 2020-12-04 DIAGNOSIS — K21.9 GASTROESOPHAGEAL REFLUX DISEASE WITHOUT ESOPHAGITIS: ICD-10-CM

## 2020-12-04 RX ORDER — OMEPRAZOLE 40 MG/1
40 CAPSULE, DELAYED RELEASE ORAL DAILY
Qty: 90 CAPSULE | Refills: 0 | Status: SHIPPED | OUTPATIENT
Start: 2020-12-04 | End: 2021-06-10 | Stop reason: SDUPTHER

## 2021-01-25 ENCOUNTER — TELEPHONE (OUTPATIENT)
Dept: NEUROLOGY | Facility: CLINIC | Age: 61
End: 2021-01-25

## 2021-01-25 NOTE — TELEPHONE ENCOUNTER
The patient's daughter called to have appointment changed to a virtual video visit  Confirmed everything on registration  Added bookings to Teams  Mailed medical communication consent to patient

## 2021-01-26 ENCOUNTER — TELEMEDICINE (OUTPATIENT)
Dept: NEUROLOGY | Facility: CLINIC | Age: 61
End: 2021-01-26
Payer: COMMERCIAL

## 2021-01-26 ENCOUNTER — TELEPHONE (OUTPATIENT)
Dept: NEUROLOGY | Facility: CLINIC | Age: 61
End: 2021-01-26

## 2021-01-26 DIAGNOSIS — R53.83 OTHER FATIGUE: ICD-10-CM

## 2021-01-26 DIAGNOSIS — I63.9 THALAMIC STROKE (HCC): Primary | ICD-10-CM

## 2021-01-26 DIAGNOSIS — R53.83 FATIGUE: ICD-10-CM

## 2021-01-26 DIAGNOSIS — Z86.73 HISTORY OF CVA (CEREBROVASCULAR ACCIDENT): ICD-10-CM

## 2021-01-26 PROCEDURE — 99214 OFFICE O/P EST MOD 30 MIN: CPT | Performed by: PSYCHIATRY & NEUROLOGY

## 2021-01-26 PROCEDURE — 1036F TOBACCO NON-USER: CPT | Performed by: PSYCHIATRY & NEUROLOGY

## 2021-01-26 NOTE — PROGRESS NOTES
Virtual Regular Visit      Assessment/Plan:    Problem List Items Addressed This Visit        Nervous and Auditory    Thalamic stroke ([facility] [address] ) - Primary     -c/w with combination of aspirin and atorvastatin 80mg PO QHS for secondary stroke prevention    Risk factor modifications/prevention  -BP goal < 130/80, BP is at goal    -continue to monitor his DM and her euglycemic control  -check lipid panel  -Defer to PCP regarding DM and BP management    Therapy -   - PT/OT - n/a    Counseling -   -does not smoke at this time   -denies any history of snoring    -I advised patient to avoid using NSAIDs for headaches or other pain and to stick to tylenol if needed  -Recommend mediterranean diet & regular exercise regimen atleast 4-5 times a week for 20-30 minutes  -I educated patient/family regarding medication compliance              Other    History of CVA (cerebrovascular accident)    Relevant Orders    Lipid panel    Other fatigue     -check TSH, B12, vitamin D to look for fatigue  -will need to adjust her elavil if her vitamin levels are WNL  Fatigue    Relevant Orders    TSH, 3rd generation    Vitamin B12    Vitamin D 25 hydroxy        Follow up - 3 months  I would be happy to see the patient sooner if any new questions/concerns arise  Patient/Guardian was advised to the call the office if they have any questions and concerns in the meantime  Patient/Guardian does understand that if they have any new stroke like symptoms such as facial droop on one side, weakness/paralysis on either side, speech trouble, numbness on one side, balance issues, any vision changes, extreme dizziness or any new headache, to call 9-1-1 immediately or to proceed to the nearest ER immediately          Reason for visit is   Chief Complaint   Patient presents with    Virtual Regular Visit        Encounter provider Ruth Lyn MD    Provider located at [facility] [id number]      Recent Visits  Date Type Provider Dept   01/25/21 Telephone [address] [facility] recent visits within past 7 days and meeting all other requirements     Today's Visits  Date Type Provider Dept   01/26/21 Telephone Noni Alexander MA [facility]   01/26/21 [address] Khalida MD [facility]   Showing today's visits and meeting all other requirements     Future Appointments  No visits were found meeting these conditions  Showing future appointments within next 150 days and meeting all other requirements        The patient was identified by name and date of birth  Usman Escobedo was informed that this is a telemedicine visit and that the visit is being conducted through [employer] and patient was informed that this is a secure, HIPAA-compliant platform  She agrees to proceed     My office door was closed  No one else was in the room  She acknowledged consent and understanding of privacy and security of the video platform  The patient has agreed to participate and understands they can discontinue the visit at any time  Patient is aware this is a billable service  Subjective  Usman Escobedo is a 63 y o  female who is a follow up for left thalamic stroke  She is maintained on aspirin and atorvastatin for stroke prevention  Patient is primarily [address] speaking and son-in-law and daughter with the patient and were able to provide the history  She is still tired everyday and she sleeps 8hrs of sleep  She does not leave her house at all  She is afraid of COVID-19  She has bilateral arm swelling which has been going on for a few weeks  She has cataracts before and she is following up with ophthalmologist for this because unsure why she does not have vision in one eye  She is following next week and she is following this week Thursday and she went to her appointment this past week          HPI Past Medical History:   Diagnosis Date    Gastritis        Past Surgical History:   Procedure Laterality Date    GALLBLADDER SURGERY  2009       Current Outpatient Medications   Medication Sig Dispense Refill    Acetaminophen (TYLENOL 8 HOUR PO) Take 500 mg by mouth      amitriptyline (ELAVIL) 25 mg tablet TAKE 1 TABLET BY MOUTH DAILY AT BEDTIME 90 tablet 1    amLODIPine (NORVASC) 2 5 mg tablet Take 1 tablet (2 5 mg total) by mouth daily 30 tablet 5    aspirin 81 mg chewable tablet Chew 1 tablet (81 mg total) daily  0    atorvastatin (LIPITOR) 80 mg tablet Take 0 5 tablets (40 mg total) by mouth daily with dinner 30 tablet 3    omeprazole (PriLOSEC) 40 MG capsule Take 1 capsule (40 mg total) by mouth daily 90 capsule 0    ibuprofen (MOTRIN) 200 mg tablet Take 400 mg by mouth every 6 (six) hours as needed for mild pain       No current facility-administered medications for this visit  No Known Allergies    Review of Systems   Constitutional: Negative  Negative for appetite change and fever  HENT: Negative  Negative for hearing loss, tinnitus, trouble swallowing and voice change  Eyes: Negative  Negative for photophobia and pain  Respiratory: Negative  Negative for shortness of breath  Cardiovascular: Negative  Negative for palpitations  Gastrointestinal: Negative  Negative for nausea and vomiting  Endocrine: Negative  Negative for cold intolerance  Genitourinary: Negative  Negative for dysuria, frequency and urgency  Musculoskeletal: Negative  Negative for myalgias and neck pain  Skin: Negative  Negative for rash  Neurological: Negative  Negative for dizziness, tremors, seizures, syncope, facial asymmetry, speech difficulty, weakness, light-headedness, numbness and headaches  Hematological: Negative  Does not bruise/bleed easily  Psychiatric/Behavioral: Negative  Negative for confusion, hallucinations and sleep disturbance         Video Exam    There were no vitals filed for this visit  Physical Exam     I spent 16 minutes directly with the patient during this visit      VIRTUAL VISIT Ander Rivers acknowledges that she has consented to an online visit or consultation  She understands that the online visit is based solely on information provided by her, and that, in the absence of a face-to-face physical evaluation by the physician, the diagnosis she receives is both limited and provisional in terms of accuracy and completeness  This is not intended to replace a full medical face-to-face evaluation by the physician  Flori Chery understands and accepts these terms

## 2021-01-26 NOTE — ASSESSMENT & PLAN NOTE
-check TSH, B12, vitamin D to look for fatigue  -will need to adjust her elavil if her vitamin levels are WNL

## 2021-01-26 NOTE — ASSESSMENT & PLAN NOTE
-c/w with combination of aspirin and atorvastatin 80mg PO QHS for secondary stroke prevention    Risk factor modifications/prevention  -BP goal < 130/80, BP is at goal    -continue to monitor his DM and her euglycemic control  -check lipid panel  -Defer to PCP regarding DM and BP management    Therapy -   - PT/OT - n/a    Counseling -   -does not smoke at this time   -denies any history of snoring    -I advised patient to avoid using NSAIDs for headaches or other pain and to stick to tylenol if needed  -Recommend mediterranean diet & regular exercise regimen atleast 4-5 times a week for 20-30 minutes     -I educated patient/family regarding medication compliance

## 2021-02-17 ENCOUNTER — OFFICE VISIT (OUTPATIENT)
Dept: FAMILY MEDICINE CLINIC | Facility: CLINIC | Age: 61
End: 2021-02-17
Payer: COMMERCIAL

## 2021-02-17 VITALS
BODY MASS INDEX: 35.09 KG/M2 | DIASTOLIC BLOOD PRESSURE: 88 MMHG | HEIGHT: 65 IN | OXYGEN SATURATION: 98 % | SYSTOLIC BLOOD PRESSURE: 140 MMHG | TEMPERATURE: 98.2 F | HEART RATE: 99 BPM | RESPIRATION RATE: 16 BRPM | WEIGHT: 210.6 LBS

## 2021-02-17 DIAGNOSIS — H25.9 AGE-RELATED CATARACT OF BOTH EYES, UNSPECIFIED AGE-RELATED CATARACT TYPE: ICD-10-CM

## 2021-02-17 DIAGNOSIS — I63.9 CEREBROVASCULAR ACCIDENT (CVA), UNSPECIFIED MECHANISM (HCC): ICD-10-CM

## 2021-02-17 DIAGNOSIS — H53.9 VISUAL DISTURBANCE: ICD-10-CM

## 2021-02-17 DIAGNOSIS — I10 ESSENTIAL HYPERTENSION: ICD-10-CM

## 2021-02-17 DIAGNOSIS — I63.9 THALAMIC STROKE (HCC): ICD-10-CM

## 2021-02-17 DIAGNOSIS — Z01.818 PRE-OPERATIVE CLEARANCE: Primary | ICD-10-CM

## 2021-02-17 PROCEDURE — 99244 OFF/OP CNSLTJ NEW/EST MOD 40: CPT | Performed by: FAMILY MEDICINE

## 2021-02-17 PROCEDURE — 93000 ELECTROCARDIOGRAM COMPLETE: CPT | Performed by: FAMILY MEDICINE

## 2021-02-17 PROCEDURE — 1036F TOBACCO NON-USER: CPT | Performed by: FAMILY MEDICINE

## 2021-02-17 NOTE — PROGRESS NOTES
Chief Complaint   Patient presents with    Pre-op Exam     cataract surgery right eye 2/26/21 and left eye 3/12/21 with Dr Mary Jane Hough       Assessment/Plan:  Cleared for surgery  Reviewed EKG  Diagnoses and all orders for this visit:    Pre-operative clearance  -     POCT ECG    Age-related cataract of both eyes, unspecified age-related cataract type    Essential hypertension  -     POCT ECG    Cerebrovascular accident (CVA), unspecified mechanism ([address] )    Thalamic stroke ([address] )    Visual disturbance    BMI 35 0-35 9,adult          Subjective:      Patient ID: Jennifer Powell is a 63 y o  female  Pre op clearance  Right cataracts- poor vision  PMH: reviewed  PSH: No changes  Daughter present for translation  Follows with neurology for stroke  The following portions of the patient's history were reviewed and updated as appropriate: allergies, current medications, past medical history, past social history, past surgical history and problem list    time45  Review of Systems   Constitutional: Negative  HENT: Negative  Eyes: Positive for visual disturbance  Respiratory: Negative  Cardiovascular: Negative  Gastrointestinal: Negative  Genitourinary: Negative  Musculoskeletal: Negative  Skin: Negative  Neurological: Negative  Psychiatric/Behavioral: Negative            Objective:      /88 (BP Location: Left arm, Patient Position: Sitting, Cuff Size: Standard)   Pulse 99   Temp 98 2 °F (36 8 °C) (Tympanic)   Resp 16   Ht 5' 5" (1 651 m)   Wt 95 5 kg (210 lb 9 6 oz)   SpO2 98%   BMI 35 05 kg/m²       Current Outpatient Medications:     Acetaminophen (TYLENOL 8 HOUR PO), Take 500 mg by mouth, Disp: , Rfl:     amitriptyline (ELAVIL) 25 mg tablet, TAKE 1 TABLET BY MOUTH DAILY AT BEDTIME, Disp: 90 tablet, Rfl: 1    amLODIPine (NORVASC) 2 5 mg tablet, Take 1 tablet (2 5 mg total) by mouth daily, Disp: 30 tablet, Rfl: 5    aspirin 81 mg chewable tablet, Chew 1 tablet (81 mg total) daily, Disp: , Rfl: 0    atorvastatin (LIPITOR) 80 mg tablet, Take 0 5 tablets (40 mg total) by mouth daily with dinner, Disp: 30 tablet, Rfl: 3    omeprazole (PriLOSEC) 40 MG capsule, Take 1 capsule (40 mg total) by mouth daily, Disp: 90 capsule, Rfl: 0    ibuprofen (MOTRIN) 200 mg tablet, Take 400 mg by mouth every 6 (six) hours as needed for mild pain, Disp: , Rfl:      Physical Exam  Constitutional:       Appearance: Normal appearance  She is well-developed  She is obese  HENT:      Head: Normocephalic and atraumatic  Right Ear: Tympanic membrane, ear canal and external ear normal       Left Ear: Tympanic membrane, ear canal and external ear normal       Nose: Nose normal       Mouth/Throat:      Mouth: Mucous membranes are moist       Pharynx: Oropharynx is clear  Eyes:      Extraocular Movements: Extraocular movements intact  Conjunctiva/sclera: Conjunctivae normal       Pupils: Pupils are equal, round, and reactive to light  Comments: BL pupil opacities  Neck:      Musculoskeletal: Normal range of motion and neck supple  Cardiovascular:      Rate and Rhythm: Normal rate and regular rhythm  Pulses: Normal pulses  Heart sounds: Normal heart sounds  Pulmonary:      Effort: Pulmonary effort is normal       Breath sounds: Normal breath sounds  Abdominal:      General: Abdomen is flat  Bowel sounds are normal       Palpations: Abdomen is soft  Skin:     General: Skin is warm and dry  Neurological:      General: No focal deficit present  Mental Status: She is alert and oriented to person, place, and time  Coordination: Coordination normal       Gait: Gait normal       Deep Tendon Reflexes: Reflexes are normal and symmetric  Reflexes normal    Psychiatric:         Mood and Affect: Mood normal          Behavior: Behavior normal          Thought Content:  Thought content normal          Judgment: Judgment normal

## 2021-03-10 DIAGNOSIS — Z23 ENCOUNTER FOR IMMUNIZATION: ICD-10-CM

## 2021-03-28 ENCOUNTER — IMMUNIZATIONS (OUTPATIENT)
Dept: FAMILY MEDICINE CLINIC | Facility: HOSPITAL | Age: 61
End: 2021-03-28

## 2021-03-28 DIAGNOSIS — Z23 ENCOUNTER FOR IMMUNIZATION: Primary | ICD-10-CM

## 2021-03-28 PROCEDURE — 0001A SARS-COV-2 / COVID-19 MRNA VACCINE (PFIZER-BIONTECH) 30 MCG: CPT

## 2021-03-28 PROCEDURE — 91300 SARS-COV-2 / COVID-19 MRNA VACCINE (PFIZER-BIONTECH) 30 MCG: CPT

## 2021-04-18 ENCOUNTER — IMMUNIZATIONS (OUTPATIENT)
Dept: FAMILY MEDICINE CLINIC | Facility: HOSPITAL | Age: 61
End: 2021-04-18

## 2021-04-18 DIAGNOSIS — Z23 ENCOUNTER FOR IMMUNIZATION: Primary | ICD-10-CM

## 2021-04-18 PROCEDURE — 91300 SARS-COV-2 / COVID-19 MRNA VACCINE (PFIZER-BIONTECH) 30 MCG: CPT

## 2021-04-18 PROCEDURE — 0002A SARS-COV-2 / COVID-19 MRNA VACCINE (PFIZER-BIONTECH) 30 MCG: CPT

## 2021-04-27 DIAGNOSIS — I10 ESSENTIAL HYPERTENSION: ICD-10-CM

## 2021-04-30 DIAGNOSIS — I63.9 THALAMIC STROKE (HCC): ICD-10-CM

## 2021-04-30 DIAGNOSIS — G56.02 CARPAL TUNNEL SYNDROME OF LEFT WRIST: ICD-10-CM

## 2021-04-30 RX ORDER — AMITRIPTYLINE HYDROCHLORIDE 25 MG/1
TABLET, FILM COATED ORAL
Qty: 90 TABLET | Refills: 1 | Status: SHIPPED | OUTPATIENT
Start: 2021-04-30 | End: 2021-12-07

## 2021-05-03 RX ORDER — AMLODIPINE BESYLATE 2.5 MG/1
TABLET ORAL
Qty: 90 TABLET | Refills: 1 | OUTPATIENT
Start: 2021-05-03

## 2021-05-04 DIAGNOSIS — I63.9 CVA (CEREBRAL VASCULAR ACCIDENT) (HCC): ICD-10-CM

## 2021-05-04 DIAGNOSIS — Z86.73 HISTORY OF CVA (CEREBROVASCULAR ACCIDENT): ICD-10-CM

## 2021-05-04 RX ORDER — ATORVASTATIN CALCIUM 80 MG/1
TABLET, FILM COATED ORAL
Qty: 45 TABLET | Refills: 2 | Status: SHIPPED | OUTPATIENT
Start: 2021-05-04 | End: 2022-03-16

## 2021-05-07 DIAGNOSIS — K21.9 GASTROESOPHAGEAL REFLUX DISEASE WITHOUT ESOPHAGITIS: ICD-10-CM

## 2021-05-10 RX ORDER — OMEPRAZOLE 40 MG/1
CAPSULE, DELAYED RELEASE ORAL
Qty: 90 CAPSULE | Refills: 0 | OUTPATIENT
Start: 2021-05-10

## 2021-05-11 DIAGNOSIS — I10 ESSENTIAL HYPERTENSION: ICD-10-CM

## 2021-05-11 RX ORDER — AMLODIPINE BESYLATE 2.5 MG/1
2.5 TABLET ORAL DAILY
Qty: 30 TABLET | Refills: 5 | Status: SHIPPED | OUTPATIENT
Start: 2021-05-11 | End: 2021-11-08 | Stop reason: SDUPTHER

## 2021-05-22 ENCOUNTER — APPOINTMENT (OUTPATIENT)
Dept: LAB | Age: 61
End: 2021-05-22
Payer: COMMERCIAL

## 2021-05-22 DIAGNOSIS — R53.83 FATIGUE: ICD-10-CM

## 2021-05-22 DIAGNOSIS — Z86.73 HISTORY OF CVA (CEREBROVASCULAR ACCIDENT): ICD-10-CM

## 2021-05-22 LAB
25(OH)D3 SERPL-MCNC: 10.8 NG/ML (ref 30–100)
CHOLEST SERPL-MCNC: 140 MG/DL (ref 50–200)
HDLC SERPL-MCNC: 45 MG/DL
LDLC SERPL CALC-MCNC: 69 MG/DL (ref 0–100)
NONHDLC SERPL-MCNC: 95 MG/DL
TRIGL SERPL-MCNC: 130 MG/DL
TSH SERPL DL<=0.05 MIU/L-ACNC: 3.29 UIU/ML (ref 0.36–3.74)
VIT B12 SERPL-MCNC: 598 PG/ML (ref 100–900)

## 2021-05-22 PROCEDURE — 84443 ASSAY THYROID STIM HORMONE: CPT

## 2021-05-22 PROCEDURE — 82306 VITAMIN D 25 HYDROXY: CPT

## 2021-05-22 PROCEDURE — 82607 VITAMIN B-12: CPT

## 2021-05-22 PROCEDURE — 36415 COLL VENOUS BLD VENIPUNCTURE: CPT

## 2021-05-22 PROCEDURE — 80061 LIPID PANEL: CPT

## 2021-06-10 DIAGNOSIS — K21.9 GASTROESOPHAGEAL REFLUX DISEASE WITHOUT ESOPHAGITIS: ICD-10-CM

## 2021-06-11 RX ORDER — OMEPRAZOLE 40 MG/1
40 CAPSULE, DELAYED RELEASE ORAL DAILY
Qty: 90 CAPSULE | Refills: 0 | Status: SHIPPED | OUTPATIENT
Start: 2021-06-11 | End: 2021-09-22 | Stop reason: SDUPTHER

## 2021-06-23 ENCOUNTER — OFFICE VISIT (OUTPATIENT)
Dept: NEUROLOGY | Facility: CLINIC | Age: 61
End: 2021-06-23
Payer: COMMERCIAL

## 2021-06-23 DIAGNOSIS — Z86.73 HISTORY OF CVA (CEREBROVASCULAR ACCIDENT): Primary | ICD-10-CM

## 2021-06-23 DIAGNOSIS — R20.2 NUMBNESS AND TINGLING OF LEFT UPPER AND LOWER EXTREMITY: ICD-10-CM

## 2021-06-23 DIAGNOSIS — R20.0 NUMBNESS AND TINGLING OF LEFT UPPER AND LOWER EXTREMITY: ICD-10-CM

## 2021-06-23 DIAGNOSIS — G81.94 LEFT HEMIPARESIS (HCC): ICD-10-CM

## 2021-06-23 PROCEDURE — 99215 OFFICE O/P EST HI 40 MIN: CPT | Performed by: PSYCHIATRY & NEUROLOGY

## 2021-06-23 RX ORDER — GABAPENTIN 300 MG/1
300 CAPSULE ORAL 2 TIMES DAILY
Qty: 60 CAPSULE | Refills: 3 | Status: SHIPPED | OUTPATIENT
Start: 2021-06-23

## 2021-06-23 NOTE — PROGRESS NOTES
Patient ID: Zeferino Teague is a 63 y o  female  Assessment/Plan: This is a 62 y/o  Female who is here as a follow up for history of R thalamic CVA June 2019  PLAN:      Diagnoses and all orders for this visit:    History of CVA (cerebrovascular accident)  -c/w with combination of aspirin and atorvastatin for secondary stroke prevention  -BP goal < 130/80, BP is at goal    -continue to monitor DM and euglycemic control  -LDL goal < 100  -Defer to PCP regarding DM and BP management    Counseling -   -does not smoke at this time   -denies any history of snoring    -I advised patient to avoid using NSAIDs for headaches or other pain and to stick to tylenol if needed  -Recommend mediterranean diet & regular exercise regimen atleast 4-5 times a week for 20-30 minutes  -I educated patient/family regarding medication compliance  -will place referral for physical therapy  -     Ambulatory referral to Physical Therapy; Future  -     Ambulatory referral to Physical Therapy; Future    Left hemiparesis ([address] )  -physical therapy referral    -     Ambulatory referral to Physical Therapy; Future    Numbness and tingling of left upper and lower extremity  -will start patient on gabapentin 300mg PO BID  -     gabapentin (NEURONTIN) 300 mg capsule; Take 1 capsule (300 mg total) by mouth 2 (two) times a day       Follow up - 6 months  I would be happy to see the patient sooner if any new questions/concerns arise  Patient/Guardian was advised to the call the office if they have any questions and concerns in the meantime  Patient/Guardian does understand that if they have any new stroke like symptoms such as facial droop on one side, weakness/paralysis on either side, speech trouble, numbness on one side, balance issues, any vision changes, extreme dizziness or any new headache, to call 9-1-1 immediately or to proceed to the nearest ER immediately       Subjective:    HPI    This is a 62 y/o  Female who is here as a follow up for history of Left Thalamic CVA  Patient is doing well  She is [place] speaking and her daughter is with her who translates today  Patient and daughter have both noticed a worsening of her symptoms, she does have R sided weakness which did worsen as well  She has occasional balance issues and also has tingling/numbness on the R side of her body which does hinder her ability to sleep at night  The following portions of the patient's history were reviewed and updated as appropriate:   She  has a past medical history of Gastritis  She   Patient Active Problem List    Diagnosis Date Noted    Other fatigue 01/26/2021    Fatigue 01/26/2021    Numbness and tingling of left upper and lower extremity 07/24/2020    Nonintractable headache 03/30/2020    Carpal tunnel syndrome of left wrist 03/30/2020    Polyneuropathy, peripheral sensorimotor axonal     History of CVA (cerebrovascular accident) 08/23/2019    Tobacco abuse, in remission 08/23/2019    Thalamic stroke ([facility] [address] ) 08/23/2019    [facility] 06/29/2019     She  has a past surgical history that includes Gallbladder surgery (2009)  Her family history includes Bone cancer in her maternal grandfather and paternal grandfather; Lupus in her sister; No Known Problems in her father, maternal aunt, maternal aunt, maternal aunt, maternal aunt, maternal aunt, maternal aunt, maternal aunt, maternal aunt, maternal aunt, maternal aunt, maternal aunt, maternal grandmother, paternal grandmother, sister, sister, sister, sister, sister, and sister; Uterine cancer in her mother  She  reports that she quit smoking about 1 years ago  She smoked 0 25 packs per day  She has never used smokeless tobacco  She reports previous alcohol use  She reports that she does not use drugs    Current Outpatient Medications   Medication Sig Dispense Refill    Acetaminophen (TYLENOL 8 HOUR PO) Take 500 mg by mouth      amitriptyline (ELAVIL) 25 mg tablet TAKE 1 TABLET BY MOUTH EVERYDAY AT BEDTIME 90 tablet 1    amLODIPine (NORVASC) 2 5 mg tablet Take 1 tablet (2 5 mg total) by mouth daily 30 tablet 5    aspirin 81 mg chewable tablet Chew 1 tablet (81 mg total) daily  0    atorvastatin (LIPITOR) 80 mg tablet TAKE 1/2 TABLET BY MOUTH DAILY WITH DINNER 45 tablet 2    omeprazole (PriLOSEC) 40 MG capsule Take 1 capsule (40 mg total) by mouth daily 90 capsule 0    gabapentin (NEURONTIN) 300 mg capsule Take 1 capsule (300 mg total) by mouth 2 (two) times a day 60 capsule 3    ibuprofen (MOTRIN) 200 mg tablet Take 400 mg by mouth every 6 (six) hours as needed for mild pain (Patient not taking: Reported on 6/23/2021)       No current facility-administered medications for this visit  Current Outpatient Medications on File Prior to Visit   Medication Sig    Acetaminophen (TYLENOL 8 HOUR PO) Take 500 mg by mouth    amitriptyline (ELAVIL) 25 mg tablet TAKE 1 TABLET BY MOUTH EVERYDAY AT BEDTIME    amLODIPine (NORVASC) 2 5 mg tablet Take 1 tablet (2 5 mg total) by mouth daily    aspirin 81 mg chewable tablet Chew 1 tablet (81 mg total) daily    atorvastatin (LIPITOR) 80 mg tablet TAKE 1/2 TABLET BY MOUTH DAILY WITH DINNER    omeprazole (PriLOSEC) 40 MG capsule Take 1 capsule (40 mg total) by mouth daily    ibuprofen (MOTRIN) 200 mg tablet Take 400 mg by mouth every 6 (six) hours as needed for mild pain (Patient not taking: Reported on 6/23/2021)     No current facility-administered medications on file prior to visit  She has No Known Allergies            Objective: There were no vitals taken for this visit  /70 HR 88    Physical Exam  General - Patient is alert, awake, oriented to time, place and person, follows commands  Speech - no dysarthria noted, no aphasia noted       Neuro:   Cranial nerves: PERRL, EOMI, facial sensation intact, able to raise eyebrows symmetrically, cannot assess facial droop and tongue deviation due to face mask requirement  Motor: 4-/5 RUE and RLE and 5/5 LUE and LLE  Sensory - intact to soft touch throughout  Reflexes - 2+ throughout  Coordination - no ataxia/dysmetria noted  Gait - hemiparetic gait noted  ROS:  I personally reviewed ROS   Review of Systems   Constitutional: Negative  Negative for appetite change and fever  HENT: Negative  Negative for hearing loss, tinnitus, trouble swallowing and voice change  Eyes: Negative  Negative for photophobia and pain  Respiratory: Negative  Negative for shortness of breath  Cardiovascular: Negative  Negative for palpitations  Gastrointestinal: Negative  Negative for nausea and vomiting  Endocrine: Negative  Negative for cold intolerance  Genitourinary: Negative  Negative for dysuria, frequency and urgency  Musculoskeletal: Positive for gait problem  Negative for myalgias and neck pain  Patient Daughter stated that her balance is off and on at times  Skin: Negative  Negative for rash  Neurological: Positive for weakness and numbness  Negative for dizziness, tremors, seizures, syncope, facial asymmetry, speech difficulty, light-headedness and headaches  Patient daughter stated that patient has left side weakness and numbness  Hematological: Negative  Does not bruise/bleed easily  Psychiatric/Behavioral: Positive for confusion  Negative for hallucinations and sleep disturbance

## 2021-07-06 ENCOUNTER — OFFICE VISIT (OUTPATIENT)
Dept: FAMILY MEDICINE CLINIC | Facility: CLINIC | Age: 61
End: 2021-07-06
Payer: COMMERCIAL

## 2021-07-06 VITALS
BODY MASS INDEX: 35.19 KG/M2 | OXYGEN SATURATION: 96 % | DIASTOLIC BLOOD PRESSURE: 78 MMHG | HEART RATE: 83 BPM | TEMPERATURE: 97.6 F | SYSTOLIC BLOOD PRESSURE: 148 MMHG | WEIGHT: 211.2 LBS | RESPIRATION RATE: 21 BRPM | HEIGHT: 65 IN

## 2021-07-06 DIAGNOSIS — R53.1 LEFT-SIDED WEAKNESS: ICD-10-CM

## 2021-07-06 DIAGNOSIS — Z86.73 HISTORY OF CVA (CEREBROVASCULAR ACCIDENT): ICD-10-CM

## 2021-07-06 DIAGNOSIS — E55.9 VITAMIN D DEFICIENCY: Primary | ICD-10-CM

## 2021-07-06 PROCEDURE — 99213 OFFICE O/P EST LOW 20 MIN: CPT | Performed by: FAMILY MEDICINE

## 2021-07-06 PROCEDURE — 3008F BODY MASS INDEX DOCD: CPT | Performed by: FAMILY MEDICINE

## 2021-07-06 PROCEDURE — 3725F SCREEN DEPRESSION PERFORMED: CPT | Performed by: FAMILY MEDICINE

## 2021-07-06 RX ORDER — ERGOCALCIFEROL 1.25 MG/1
50000 CAPSULE ORAL WEEKLY
Qty: 20 CAPSULE | Refills: 1 | Status: SHIPPED | OUTPATIENT
Start: 2021-07-06

## 2021-07-06 NOTE — PROGRESS NOTES
Chief Complaint   Patient presents with    Follow-up     6 month f/u-rev bw    Leg Problem     dragging leg when walking since having a stroke    Extremity Weakness     left arm and whole left side is weak       Assessment/Plan:  Vitamin D weekly  PHQ-9 Depression Screening    PHQ-9:   Frequency of the following problems over the past two weeks:      Little interest or pleasure in doing things: 0 - not at all  Feeling down, depressed, or hopeless: 1 - several days  PHQ-2 Score: 1       BMI Counseling: Body mass index is 35 15 kg/m²  The BMI is above normal  Nutrition recommendations include consuming healthier snacks, moderation in carbohydrate intake and increasing intake of lean protein  Diagnoses and all orders for this visit:    Vitamin D deficiency  -     ergocalciferol (VITAMIN D2) 50,000 units; Take 1 capsule (50,000 Units total) by mouth once a week    History of CVA (cerebrovascular accident)    Left-sided weakness          Subjective:      Patient ID: Ab Daniel is a 63 y o  female  Review labs, left sided weakness - going to PT  Received 2 nd covid vaccine  The following portions of the patient's history were reviewed and updated as appropriate: allergies, current medications, past medical history, past social history, past surgical history and problem list     Review of Systems   Constitutional: Negative  HENT: Negative  Eyes: Negative  Respiratory: Negative  Cardiovascular: Negative  Gastrointestinal: Negative  Genitourinary: Negative  Musculoskeletal: Negative  Skin: Negative  Neurological: Positive for weakness  Left sided  Psychiatric/Behavioral: Negative            Objective:      /78 (BP Location: Right arm)   Pulse 83   Temp 97 6 °F (36 4 °C) (Temporal)   Resp 21   Ht 5' 5" (1 651 m)   Wt 95 8 kg (211 lb 3 2 oz)   SpO2 96%   BMI 35 15 kg/m²       Current Outpatient Medications:     Acetaminophen (TYLENOL 8 HOUR PO), Take 500 mg by mouth, Disp: , Rfl:     amitriptyline (ELAVIL) 25 mg tablet, TAKE 1 TABLET BY MOUTH EVERYDAY AT BEDTIME, Disp: 90 tablet, Rfl: 1    amLODIPine (NORVASC) 2 5 mg tablet, Take 1 tablet (2 5 mg total) by mouth daily, Disp: 30 tablet, Rfl: 5    aspirin 81 mg chewable tablet, Chew 1 tablet (81 mg total) daily, Disp: , Rfl: 0    atorvastatin (LIPITOR) 80 mg tablet, TAKE 1/2 TABLET BY MOUTH DAILY WITH DINNER, Disp: 45 tablet, Rfl: 2    gabapentin (NEURONTIN) 300 mg capsule, Take 1 capsule (300 mg total) by mouth 2 (two) times a day, Disp: 60 capsule, Rfl: 3    omeprazole (PriLOSEC) 40 MG capsule, Take 1 capsule (40 mg total) by mouth daily, Disp: 90 capsule, Rfl: 0    ibuprofen (MOTRIN) 200 mg tablet, Take 400 mg by mouth every 6 (six) hours as needed for mild pain (Patient not taking: Reported on 2021), Disp: , Rfl:      No Known Allergies     Physical Exam  Constitutional:       Appearance: She is well-developed  HENT:      Head: Normocephalic and atraumatic  Right Ear: External ear normal       Left Ear: External ear normal       Nose: Nose normal    Eyes:      Conjunctiva/sclera: Conjunctivae normal       Pupils: Pupils are equal, round, and reactive to light  Cardiovascular:      Rate and Rhythm: Normal rate and regular rhythm  Heart sounds: Normal heart sounds  Pulmonary:      Effort: Pulmonary effort is normal       Breath sounds: Normal breath sounds  Abdominal:      General: Bowel sounds are normal       Palpations: Abdomen is soft  Musculoskeletal:      Cervical back: Normal range of motion and neck supple  Comments: Strength right side - arm and le/5, left side 3/5  Skin:     General: Skin is warm and dry  Neurological:      Mental Status: She is alert and oriented to person, place, and time  Deep Tendon Reflexes: Reflexes are normal and symmetric   Reflexes normal    Psychiatric:         Mood and Affect: Mood normal          Behavior: Behavior normal  Thought Content:  Thought content normal          Judgment: Judgment normal

## 2021-09-22 DIAGNOSIS — K21.9 GASTROESOPHAGEAL REFLUX DISEASE WITHOUT ESOPHAGITIS: ICD-10-CM

## 2021-09-22 RX ORDER — OMEPRAZOLE 40 MG/1
40 CAPSULE, DELAYED RELEASE ORAL DAILY
Qty: 90 CAPSULE | Refills: 1 | Status: SHIPPED | OUTPATIENT
Start: 2021-09-22 | End: 2022-03-21

## 2021-11-08 ENCOUNTER — OFFICE VISIT (OUTPATIENT)
Dept: FAMILY MEDICINE CLINIC | Facility: CLINIC | Age: 61
End: 2021-11-08
Payer: COMMERCIAL

## 2021-11-08 VITALS
HEIGHT: 65 IN | DIASTOLIC BLOOD PRESSURE: 72 MMHG | BODY MASS INDEX: 34.62 KG/M2 | HEART RATE: 83 BPM | TEMPERATURE: 97.6 F | WEIGHT: 207.8 LBS | OXYGEN SATURATION: 99 % | SYSTOLIC BLOOD PRESSURE: 134 MMHG

## 2021-11-08 DIAGNOSIS — R73.03 PREDIABETES: ICD-10-CM

## 2021-11-08 DIAGNOSIS — I63.9 THALAMIC STROKE (HCC): Primary | ICD-10-CM

## 2021-11-08 DIAGNOSIS — M99.07 UPPER EXTREMITY SOMATIC DYSFUNCTION: ICD-10-CM

## 2021-11-08 DIAGNOSIS — R20.0 NUMBNESS AND TINGLING OF LEFT UPPER AND LOWER EXTREMITY: ICD-10-CM

## 2021-11-08 DIAGNOSIS — M79.602 LEFT ARM PAIN: ICD-10-CM

## 2021-11-08 DIAGNOSIS — S46.919A MUSCLE STRAIN OF UPPER EXTREMITY, INITIAL ENCOUNTER: ICD-10-CM

## 2021-11-08 DIAGNOSIS — I10 ESSENTIAL HYPERTENSION: ICD-10-CM

## 2021-11-08 DIAGNOSIS — R20.2 NUMBNESS AND TINGLING OF LEFT UPPER AND LOWER EXTREMITY: ICD-10-CM

## 2021-11-08 PROCEDURE — 98925 OSTEOPATH MANJ 1-2 REGIONS: CPT | Performed by: FAMILY MEDICINE

## 2021-11-08 PROCEDURE — 99214 OFFICE O/P EST MOD 30 MIN: CPT | Performed by: FAMILY MEDICINE

## 2021-11-08 RX ORDER — AMLODIPINE BESYLATE 2.5 MG/1
2.5 TABLET ORAL DAILY
Qty: 90 TABLET | Refills: 1 | Status: SHIPPED | OUTPATIENT
Start: 2021-11-08

## 2021-12-02 ENCOUNTER — TELEPHONE (OUTPATIENT)
Dept: NEUROLOGY | Facility: CLINIC | Age: 61
End: 2021-12-02

## 2021-12-02 DIAGNOSIS — Z86.73 HISTORY OF CVA (CEREBROVASCULAR ACCIDENT): Primary | ICD-10-CM

## 2021-12-02 DIAGNOSIS — I63.9 THALAMIC STROKE (HCC): ICD-10-CM

## 2021-12-06 DIAGNOSIS — I63.9 THALAMIC STROKE (HCC): ICD-10-CM

## 2021-12-06 DIAGNOSIS — G56.02 CARPAL TUNNEL SYNDROME OF LEFT WRIST: ICD-10-CM

## 2021-12-07 RX ORDER — AMITRIPTYLINE HYDROCHLORIDE 25 MG/1
TABLET, FILM COATED ORAL
Qty: 90 TABLET | Refills: 1 | Status: SHIPPED | OUTPATIENT
Start: 2021-12-07

## 2022-03-16 DIAGNOSIS — Z86.73 HISTORY OF CVA (CEREBROVASCULAR ACCIDENT): ICD-10-CM

## 2022-03-16 DIAGNOSIS — I63.9 CVA (CEREBRAL VASCULAR ACCIDENT) (HCC): ICD-10-CM

## 2022-03-16 RX ORDER — ATORVASTATIN CALCIUM 80 MG/1
TABLET, FILM COATED ORAL
Qty: 45 TABLET | Refills: 2 | Status: SHIPPED | OUTPATIENT
Start: 2022-03-16

## 2022-05-10 ENCOUNTER — TELEPHONE (OUTPATIENT)
Dept: NEUROLOGY | Facility: CLINIC | Age: 62
End: 2022-05-10

## 2022-05-10 NOTE — TELEPHONE ENCOUNTER
Patient's daughter called in - stated the neurologist they are trying to see needs more info  Offered to send an MARCELL form to her and explained the process  She prefers this go to her email  She will print it out and try to send via [employer]  Emailed to [email or web address] through secure fax  Awaiting form

## 2022-05-11 NOTE — TELEPHONE ENCOUNTER
Received MARCELL  Faxed to [phone]  Replied to the patient / daughter via [employer] notifying them this was sent  I provided the phone number to their office as well so she may follow up as needed

## 2023-01-19 DIAGNOSIS — I63.9 CVA (CEREBRAL VASCULAR ACCIDENT) (HCC): ICD-10-CM

## 2023-01-19 DIAGNOSIS — Z86.73 HISTORY OF CVA (CEREBROVASCULAR ACCIDENT): ICD-10-CM

## 2023-01-20 RX ORDER — ATORVASTATIN CALCIUM 80 MG/1
TABLET, FILM COATED ORAL
Qty: 45 TABLET | Refills: 2 | Status: SHIPPED | OUTPATIENT
Start: 2023-01-20

## 2023-05-31 ENCOUNTER — TELEPHONE (OUTPATIENT)
Dept: FAMILY MEDICINE CLINIC | Facility: CLINIC | Age: 63
End: 2023-05-31

## 2023-05-31 DIAGNOSIS — Z12.31 ENCOUNTER FOR SCREENING MAMMOGRAM FOR MALIGNANT NEOPLASM OF BREAST: Primary | ICD-10-CM

## 2023-05-31 NOTE — LETTER
May 31, 2023     [address] [facility]      Dear Ms Manuel Christine: In addition to helping you feel better when you are sick, we are interested in preventing illness and injury in the first place  In the spirit of maintaining your good health, our system indicates that you are due for the following:    Health Maintenance Due   Topic Date Due   • Hepatitis C Screening  Never done   • HIV Screening  Never done   • BMI: Adult  Never done   • Annual Physical  Never done   • Hepatitis A Vaccine (1 of 2 - Risk 2-dose series) Never done   • Cervical Cancer Screening  Never done   • Colorectal Cancer Screening  Never done   • Hepatitis B Vaccine (1 of 3 - Risk 3-dose series) Never done   • COVID-19 Vaccine (3 - Pfizer series) 06/13/2021   • Breast Cancer Screening: Mammogram  11/06/2021   • Depression Screening  07/06/2022       Please call us to make an appointment at your earliest convenience  I look forward to seeing you soon          Sincerely,     [facility]

## 2023-05-31 NOTE — LETTER
[facility]    [address] [facility] [id number]    Date: 05/31/23    [place] [id number]    Dear Bro Patel:    Your health is very important to us  We have identified you as currently needing a breast cancer screening  Mammograms are recommended for any woman 40 and over, and are low-dose radiation x-rays that can detect cancer too small to feel by you or your doctor  If you have an order you may call [facility] at  to schedule your mammogram  and For an order placed after April 2022, you also have the option to schedule yourself directly through [facility] for your Mammogram     If you have not been seen in the last 6 months, please call [phone] to schedule an appointment at the office  In case you are receiving this letter and you have already had your mammogram, please contact our office at [phone] with the information so that we can obtain a copy for our records  If you have any issues with scheduling or transportation, please contact our office for assistance       Sincerely,    [address] [place] [address] Avenue

## 2023-06-06 ENCOUNTER — TELEPHONE (OUTPATIENT)
Dept: FAMILY MEDICINE CLINIC | Facility: CLINIC | Age: 63
End: 2023-06-06

## 2023-06-06 NOTE — LETTER
June 6, 2023    Sean Wallis  1960  [address], [address]    Dear Ashley Rodriguez,     Your health is our first priority  It is time for your colorectal cancer screening  It's important, safe, and you have options  According to guidelines from the [address] [place] [address] Task Force, it is recommended that individuals between the ages of 39-70 get screened for CRC  As you fall within this age range, we strongly recommend that you get screened for this preventable and treatable cancer  There are several options for CRC screening including home based stool tests and colonoscopy  Your healthcare provider will be able to discuss the best option for you based on your medical history and preferences  To schedule a CRC screening, please contact our office at [phone] or make an appointment online through our patient portal  It is important to follow through with this recommendation, even if you do not have any symptoms  Thank you for considering this important step in your healthcare  If you have any questions or concerns, please do not hesitate to contact us       Sincerely,    Mikala Garrett, DO

## 2023-07-14 ENCOUNTER — TELEPHONE (OUTPATIENT)
Dept: FAMILY MEDICINE CLINIC | Facility: CLINIC | Age: 63
End: 2023-07-14

## 2023-11-03 DIAGNOSIS — Z86.73 HISTORY OF CVA (CEREBROVASCULAR ACCIDENT): ICD-10-CM

## 2023-11-03 DIAGNOSIS — I63.9 CVA (CEREBRAL VASCULAR ACCIDENT) (HCC): ICD-10-CM

## 2023-11-03 RX ORDER — ATORVASTATIN CALCIUM 80 MG/1
TABLET, FILM COATED ORAL
Qty: 45 TABLET | Refills: 2 | Status: SHIPPED | OUTPATIENT
Start: 2023-11-03

## 2024-09-01 NOTE — ED PROCEDURE NOTE
PROCEDURE  ECG 12 Lead Documentation Only  Date/Time: 6/29/2019 10:57 AM  Performed by: Rosemarie Molina DO  Authorized by: Rosemarie Molina DO     Indications / Diagnosis:  Left-sided numbness  ECG reviewed by me, the ED Provider: yes    Patient location:  ED  Interpretation:     Interpretation: normal    Rate:     ECG rate assessment: normal    Rhythm:     Rhythm: sinus rhythm    Ectopy:     Ectopy: none    Conduction:     Conduction: normal    ST segments:     ST segments:  Normal  T waves:     T waves: normal           Rosemarie Molina DO  06/29/19 [address]

## 2024-12-07 DIAGNOSIS — I63.9 CVA (CEREBRAL VASCULAR ACCIDENT) (HCC): ICD-10-CM

## 2024-12-07 DIAGNOSIS — Z86.73 HISTORY OF CVA (CEREBROVASCULAR ACCIDENT): ICD-10-CM

## 2024-12-10 RX ORDER — ATORVASTATIN CALCIUM 80 MG/1
TABLET, FILM COATED ORAL
Qty: 45 TABLET | Refills: 2 | Status: SHIPPED | OUTPATIENT
Start: 2024-12-10

## 2025-07-28 DIAGNOSIS — I63.9 CVA (CEREBRAL VASCULAR ACCIDENT) (HCC): ICD-10-CM

## 2025-07-28 DIAGNOSIS — Z86.73 HISTORY OF CVA (CEREBROVASCULAR ACCIDENT): ICD-10-CM

## 2025-07-29 RX ORDER — ATORVASTATIN CALCIUM 80 MG/1
TABLET, FILM COATED ORAL
Qty: 45 TABLET | Refills: 2 | Status: SHIPPED | OUTPATIENT
Start: 2025-07-29